# Patient Record
Sex: FEMALE | Race: OTHER | HISPANIC OR LATINO
[De-identification: names, ages, dates, MRNs, and addresses within clinical notes are randomized per-mention and may not be internally consistent; named-entity substitution may affect disease eponyms.]

---

## 2017-03-13 ENCOUNTER — APPOINTMENT (OUTPATIENT)
Dept: SURGICAL ONCOLOGY | Facility: CLINIC | Age: 62
End: 2017-03-13

## 2017-03-13 VITALS
HEART RATE: 71 BPM | BODY MASS INDEX: 21.52 KG/M2 | SYSTOLIC BLOOD PRESSURE: 169 MMHG | WEIGHT: 114 LBS | HEIGHT: 61 IN | DIASTOLIC BLOOD PRESSURE: 102 MMHG

## 2017-05-02 ENCOUNTER — OUTPATIENT (OUTPATIENT)
Dept: OUTPATIENT SERVICES | Facility: HOSPITAL | Age: 62
LOS: 1 days | End: 2017-05-02
Payer: MEDICAID

## 2017-05-02 VITALS
DIASTOLIC BLOOD PRESSURE: 70 MMHG | HEART RATE: 55 BPM | RESPIRATION RATE: 14 BRPM | WEIGHT: 110.89 LBS | TEMPERATURE: 98 F | SYSTOLIC BLOOD PRESSURE: 104 MMHG | HEIGHT: 61.5 IN

## 2017-05-02 DIAGNOSIS — Z85.07 PERSONAL HISTORY OF MALIGNANT NEOPLASM OF PANCREAS: Chronic | ICD-10-CM

## 2017-05-02 DIAGNOSIS — G51.0 BELL'S PALSY: Chronic | ICD-10-CM

## 2017-05-02 DIAGNOSIS — K46.9 UNSPECIFIED ABDOMINAL HERNIA WITHOUT OBSTRUCTION OR GANGRENE: ICD-10-CM

## 2017-05-02 DIAGNOSIS — D37.6 NEOPLASM OF UNCERTAIN BEHAVIOR OF LIVER, GALLBLADDER AND BILE DUCTS: ICD-10-CM

## 2017-05-02 LAB
BLD GP AB SCN SERPL QL: NEGATIVE — SIGNIFICANT CHANGE UP
BUN SERPL-MCNC: 15 MG/DL — SIGNIFICANT CHANGE UP (ref 7–23)
CALCIUM SERPL-MCNC: 9.6 MG/DL — SIGNIFICANT CHANGE UP (ref 8.4–10.5)
CHLORIDE SERPL-SCNC: 99 MMOL/L — SIGNIFICANT CHANGE UP (ref 98–107)
CO2 SERPL-SCNC: 30 MMOL/L — SIGNIFICANT CHANGE UP (ref 22–31)
CREAT SERPL-MCNC: 0.79 MG/DL — SIGNIFICANT CHANGE UP (ref 0.5–1.3)
GLUCOSE SERPL-MCNC: 100 MG/DL — HIGH (ref 70–99)
HCT VFR BLD CALC: 40 % — SIGNIFICANT CHANGE UP (ref 34.5–45)
HGB BLD-MCNC: 12.3 G/DL — SIGNIFICANT CHANGE UP (ref 11.5–15.5)
MCHC RBC-ENTMCNC: 28.5 PG — SIGNIFICANT CHANGE UP (ref 27–34)
MCHC RBC-ENTMCNC: 30.8 % — LOW (ref 32–36)
MCV RBC AUTO: 92.6 FL — SIGNIFICANT CHANGE UP (ref 80–100)
PLATELET # BLD AUTO: 242 K/UL — SIGNIFICANT CHANGE UP (ref 150–400)
PMV BLD: 10.4 FL — SIGNIFICANT CHANGE UP (ref 7–13)
POTASSIUM SERPL-MCNC: 3.9 MMOL/L — SIGNIFICANT CHANGE UP (ref 3.5–5.3)
POTASSIUM SERPL-SCNC: 3.9 MMOL/L — SIGNIFICANT CHANGE UP (ref 3.5–5.3)
RBC # BLD: 4.32 M/UL — SIGNIFICANT CHANGE UP (ref 3.8–5.2)
RBC # FLD: 12.8 % — SIGNIFICANT CHANGE UP (ref 10.3–14.5)
RH IG SCN BLD-IMP: POSITIVE — SIGNIFICANT CHANGE UP
SODIUM SERPL-SCNC: 141 MMOL/L — SIGNIFICANT CHANGE UP (ref 135–145)
WBC # BLD: 5.05 K/UL — SIGNIFICANT CHANGE UP (ref 3.8–10.5)
WBC # FLD AUTO: 5.05 K/UL — SIGNIFICANT CHANGE UP (ref 3.8–10.5)

## 2017-05-02 PROCEDURE — 93010 ELECTROCARDIOGRAM REPORT: CPT

## 2017-05-02 RX ORDER — SODIUM CHLORIDE 9 MG/ML
1000 INJECTION, SOLUTION INTRAVENOUS
Qty: 0 | Refills: 0 | Status: DISCONTINUED | OUTPATIENT
Start: 2017-05-16 | End: 2017-05-17

## 2017-05-02 NOTE — H&P PST ADULT - VISION (WITH CORRECTIVE LENSES IF THE PATIENT USUALLY WEARS THEM):
Normal vision: sees adequately in most situations; can see medication labels, newsprint Normal vision: sees adequately in most situations; can see medication labels, newsprint/reading glasses

## 2017-05-02 NOTE — H&P PST ADULT - RS GEN PE MLT RESP DETAILS PC
breath sounds equal/no rales/good air movement/no intercostal retractions/airway patent/no rhonchi/no subcutaneous emphysema/no chest wall tenderness/respirations non-labored/no wheezes/clear to auscultation bilaterally

## 2017-05-02 NOTE — H&P PST ADULT - NEGATIVE GENERAL GENITOURINARY SYMPTOMS
no flank pain R/no bladder infections/no dysuria/no renal colic/no incontinence/normal urinary frequency/no urinary hesitancy/no hematuria/no nocturia/no flank pain L

## 2017-05-02 NOTE — H&P PST ADULT - NEGATIVE GENERAL SYMPTOMS
no weight loss/no polydipsia/no polyphagia/no polyuria/no fever/no malaise/no weight gain/no anorexia/no sweating/no chills/no fatigue

## 2017-05-02 NOTE — H&P PST ADULT - FAMILY HISTORY
Mother  Still living? No  Family history of COPD (chronic obstructive pulmonary disease), Age at diagnosis: Age Unknown  Family history of hypertension, Age at diagnosis: Age Unknown     Sibling  Still living? Yes, Estimated age: Age Unknown  Family history of hypertension, Age at diagnosis: Age Unknown

## 2017-05-02 NOTE — H&P PST ADULT - NEGATIVE ENMT SYMPTOMS
no ear pain/no hearing difficulty/no tinnitus/no throat pain/no nasal obstruction/no dysphagia/no nasal congestion/no sinus symptoms/no vertigo/no nasal discharge

## 2017-05-02 NOTE — H&P PST ADULT - NEGATIVE NEUROLOGICAL SYMPTOMS
no paresthesias/no difficulty walking/no weakness/no headache/no loss of sensation/no generalized seizures/no focal seizures/no confusion/no transient paralysis/no syncope/no tremors/no vertigo/no hemiparesis

## 2017-05-02 NOTE — H&P PST ADULT - NEGATIVE GASTROINTESTINAL SYMPTOMS
no vomiting/no melena/no nausea/no constipation/no change in bowel habits/no diarrhea/no abdominal pain no diarrhea/no melena/no change in bowel habits/no abdominal pain/no vomiting/no nausea

## 2017-05-02 NOTE — H&P PST ADULT - NEGATIVE OPHTHALMOLOGIC SYMPTOMS
no photophobia/no discharge R/no diplopia/no loss of vision L/no blurred vision L/no blurred vision R/no pain R/no loss of vision R

## 2017-05-02 NOTE — H&P PST ADULT - NEGATIVE CARDIOVASCULAR SYMPTOMS
no orthopnea/no peripheral edema/no paroxysmal nocturnal dyspnea/no palpitations/no chest pain/no claudication/no dyspnea on exertion

## 2017-05-02 NOTE — H&P PST ADULT - GASTROINTESTINAL DETAILS
bowel sounds normal/no rigidity/no masses palpable/no guarding/no rebound tenderness/no organomegaly/soft/no bruit/nontender/no distention

## 2017-05-02 NOTE — H&P PST ADULT - NEGATIVE MUSCULOSKELETAL SYMPTOMS
no muscle weakness/no back pain/no joint swelling/no arthralgia/no neck pain/no stiffness/no arthritis

## 2017-05-02 NOTE — H&P PST ADULT - PSH
Bell's palsy  s/p surgery  H/O pancreatic cancer  s/p Whipple procedure 2011  S/P Breast Biopsy, Right  benign-1987  s/p hemorrhoidectomy-1994    s/p lap cholecystectomy- 1990

## 2017-05-02 NOTE — H&P PST ADULT - NSANTHOSAYNRD_GEN_A_CORE
No. BENITO screening performed.  STOP BANG Legend: 0-2 = LOW Risk; 3-4 = INTERMEDIATE Risk; 5-8 = HIGH Risk

## 2017-05-02 NOTE — H&P PST ADULT - CORNEAL ABRASION RISK
clear eyes daily, and face mask to sleep, because right eye does not close properly due to Hx of bell's palsy

## 2017-05-02 NOTE — H&P PST ADULT - PMH
Cholelithiasis, Common Bile Duct  1990  Depression    FUO (Fever of Unknown Origin)  1964- right facial palsy  Incisional hernia    Pancreatic cancer  s/p Whipple in 2011

## 2017-05-02 NOTE — H&P PST ADULT - REASON FOR ADMISSION
neoplasm of uncertain behavior of liver, gallbladder, or bile ducts  incisional hernia without obstruction or gangrene

## 2017-05-02 NOTE — H&P PST ADULT - PROBLEM SELECTOR PLAN 1
Pt is scheduled for repair of incisional hernia, abdominal wall reconstruction with muscle flaps and skin flaps for 5/16/17. Pt is scheduled for repair of incisional hernia, abdominal wall reconstruction with muscle flaps and skin flaps for 5/16/17. Preop instructions, pepcid, surgical scrub provided. Pt stated understanding. Pt is scheduled for repair of incisional hernia, abdominal wall reconstruction with muscle flaps and skin flaps for 5/16/17. Preop instructions, pepcid, surgical scrub provided. Pt stated understanding. Pending medical clearance

## 2017-05-16 ENCOUNTER — RESULT REVIEW (OUTPATIENT)
Age: 62
End: 2017-05-16

## 2017-05-16 ENCOUNTER — APPOINTMENT (OUTPATIENT)
Dept: SURGICAL ONCOLOGY | Facility: HOSPITAL | Age: 62
End: 2017-05-16

## 2017-05-16 ENCOUNTER — INPATIENT (INPATIENT)
Facility: HOSPITAL | Age: 62
LOS: 5 days | Discharge: HOME HEALTH | End: 2017-05-22
Attending: SPECIALIST | Admitting: SPECIALIST
Payer: MEDICAID

## 2017-05-16 VITALS
DIASTOLIC BLOOD PRESSURE: 72 MMHG | HEART RATE: 67 BPM | RESPIRATION RATE: 15 BRPM | HEIGHT: 61 IN | WEIGHT: 110.89 LBS | OXYGEN SATURATION: 99 % | SYSTOLIC BLOOD PRESSURE: 137 MMHG | TEMPERATURE: 98 F

## 2017-05-16 DIAGNOSIS — D37.6 NEOPLASM OF UNCERTAIN BEHAVIOR OF LIVER, GALLBLADDER AND BILE DUCTS: ICD-10-CM

## 2017-05-16 DIAGNOSIS — Z85.07 PERSONAL HISTORY OF MALIGNANT NEOPLASM OF PANCREAS: Chronic | ICD-10-CM

## 2017-05-16 DIAGNOSIS — G51.0 BELL'S PALSY: Chronic | ICD-10-CM

## 2017-05-16 PROCEDURE — 88307 TISSUE EXAM BY PATHOLOGIST: CPT | Mod: 26

## 2017-05-16 PROCEDURE — 44120 REMOVAL OF SMALL INTESTINE: CPT

## 2017-05-16 PROCEDURE — 49561: CPT | Mod: 59

## 2017-05-16 PROCEDURE — 88305 TISSUE EXAM BY PATHOLOGIST: CPT | Mod: 26

## 2017-05-16 PROCEDURE — 44121 REMOVAL OF SMALL INTESTINE: CPT

## 2017-05-16 PROCEDURE — 43633 REMOVAL OF STOMACH PARTIAL: CPT

## 2017-05-16 PROCEDURE — 44050 REDUCE BOWEL OBSTRUCTION: CPT

## 2017-05-16 RX ORDER — CEFOTETAN DISODIUM 1 G
2 VIAL (EA) INJECTION EVERY 12 HOURS
Qty: 0 | Refills: 0 | Status: COMPLETED | OUTPATIENT
Start: 2017-05-16 | End: 2017-05-17

## 2017-05-16 RX ORDER — ACETAMINOPHEN 500 MG
1000 TABLET ORAL ONCE
Qty: 0 | Refills: 0 | Status: COMPLETED | OUTPATIENT
Start: 2017-05-16 | End: 2017-05-16

## 2017-05-16 RX ORDER — HYDROMORPHONE HYDROCHLORIDE 2 MG/ML
0.5 INJECTION INTRAMUSCULAR; INTRAVENOUS; SUBCUTANEOUS
Qty: 0 | Refills: 0 | Status: DISCONTINUED | OUTPATIENT
Start: 2017-05-16 | End: 2017-05-17

## 2017-05-16 RX ORDER — HEPARIN SODIUM 5000 [USP'U]/ML
5000 INJECTION INTRAVENOUS; SUBCUTANEOUS EVERY 12 HOURS
Qty: 0 | Refills: 0 | Status: DISCONTINUED | OUTPATIENT
Start: 2017-05-16 | End: 2017-05-20

## 2017-05-16 RX ORDER — ACETAMINOPHEN 500 MG
1000 TABLET ORAL ONCE
Qty: 0 | Refills: 0 | Status: COMPLETED | OUTPATIENT
Start: 2017-05-17 | End: 2017-05-17

## 2017-05-16 RX ORDER — HYDROMORPHONE HYDROCHLORIDE 2 MG/ML
0.5 INJECTION INTRAMUSCULAR; INTRAVENOUS; SUBCUTANEOUS
Qty: 0 | Refills: 0 | Status: DISCONTINUED | OUTPATIENT
Start: 2017-05-16 | End: 2017-05-20

## 2017-05-16 RX ORDER — ONDANSETRON 8 MG/1
4 TABLET, FILM COATED ORAL ONCE
Qty: 0 | Refills: 0 | Status: COMPLETED | OUTPATIENT
Start: 2017-05-16 | End: 2017-05-16

## 2017-05-16 RX ORDER — PANTOPRAZOLE SODIUM 20 MG/1
40 TABLET, DELAYED RELEASE ORAL DAILY
Qty: 0 | Refills: 0 | Status: DISCONTINUED | OUTPATIENT
Start: 2017-05-16 | End: 2017-05-22

## 2017-05-16 RX ORDER — ONDANSETRON 8 MG/1
4 TABLET, FILM COATED ORAL EVERY 6 HOURS
Qty: 0 | Refills: 0 | Status: DISCONTINUED | OUTPATIENT
Start: 2017-05-16 | End: 2017-05-22

## 2017-05-16 RX ORDER — ACETAMINOPHEN 500 MG
1000 TABLET ORAL ONCE
Qty: 0 | Refills: 0 | Status: COMPLETED | OUTPATIENT
Start: 2017-05-16 | End: 2017-05-17

## 2017-05-16 RX ORDER — NALOXONE HYDROCHLORIDE 4 MG/.1ML
0.1 SPRAY NASAL
Qty: 0 | Refills: 0 | Status: DISCONTINUED | OUTPATIENT
Start: 2017-05-16 | End: 2017-05-20

## 2017-05-16 RX ADMIN — Medication 100 GRAM(S): at 15:15

## 2017-05-16 RX ADMIN — SODIUM CHLORIDE 100 MILLILITER(S): 9 INJECTION, SOLUTION INTRAVENOUS at 23:49

## 2017-05-16 RX ADMIN — HYDROMORPHONE HYDROCHLORIDE 0.5 MILLIGRAM(S): 2 INJECTION INTRAMUSCULAR; INTRAVENOUS; SUBCUTANEOUS at 16:17

## 2017-05-16 RX ADMIN — HEPARIN SODIUM 5000 UNIT(S): 5000 INJECTION INTRAVENOUS; SUBCUTANEOUS at 23:16

## 2017-05-16 RX ADMIN — Medication 400 MILLIGRAM(S): at 20:37

## 2017-05-16 RX ADMIN — HYDROMORPHONE HYDROCHLORIDE 0.5 MILLIGRAM(S): 2 INJECTION INTRAMUSCULAR; INTRAVENOUS; SUBCUTANEOUS at 16:30

## 2017-05-16 RX ADMIN — Medication 1000 MILLIGRAM(S): at 21:51

## 2017-05-16 RX ADMIN — HYDROMORPHONE HYDROCHLORIDE 0.5 MILLIGRAM(S): 2 INJECTION INTRAMUSCULAR; INTRAVENOUS; SUBCUTANEOUS at 15:53

## 2017-05-16 RX ADMIN — ONDANSETRON 4 MILLIGRAM(S): 8 TABLET, FILM COATED ORAL at 16:00

## 2017-05-16 NOTE — BRIEF OPERATIVE NOTE - OPERATION/FINDINGS
Exploratory laparotomy, ventral hernia repair (no mesh) with anterior component separation. Calin En Y Jejuno-Jejunostomy for enterotomy in afferent jejunal limb

## 2017-05-16 NOTE — BRIEF OPERATIVE NOTE - PROCEDURE
Ventral hernia repair  05/16/2017  with anterior component separation. Calin En Y Jejuno-Jejunostomy for enterotomy in afferent jejunal limb  Active  [id number]

## 2017-05-16 NOTE — BRIEF OPERATIVE NOTE - COMMENTS
extubated in OR => PACU => FLOOR  - NPO/NGT  - Received epidural preop  - Must be on protonix indefinitely to prevent marginal ulcer

## 2017-05-16 NOTE — BRIEF OPERATIVE NOTE - SPECIMENS
Small bowel and stomach at gastreojej, small bowel at enteroenterostomy, skin and scar from abdominal wound

## 2017-05-17 LAB
ALBUMIN SERPL ELPH-MCNC: 3 G/DL — LOW (ref 3.3–5)
ALP SERPL-CCNC: 50 U/L — SIGNIFICANT CHANGE UP (ref 40–120)
ALT FLD-CCNC: 10 U/L — SIGNIFICANT CHANGE UP (ref 4–33)
APTT BLD: 33.1 SEC — SIGNIFICANT CHANGE UP (ref 27.5–37.4)
AST SERPL-CCNC: 21 U/L — SIGNIFICANT CHANGE UP (ref 4–32)
BILIRUB SERPL-MCNC: 0.3 MG/DL — SIGNIFICANT CHANGE UP (ref 0.2–1.2)
BUN SERPL-MCNC: 20 MG/DL — SIGNIFICANT CHANGE UP (ref 7–23)
CALCIUM SERPL-MCNC: 8.3 MG/DL — LOW (ref 8.4–10.5)
CHLORIDE SERPL-SCNC: 103 MMOL/L — SIGNIFICANT CHANGE UP (ref 98–107)
CO2 SERPL-SCNC: 25 MMOL/L — SIGNIFICANT CHANGE UP (ref 22–31)
CREAT SERPL-MCNC: 0.99 MG/DL — SIGNIFICANT CHANGE UP (ref 0.5–1.3)
GLUCOSE SERPL-MCNC: 118 MG/DL — HIGH (ref 70–99)
HCT VFR BLD CALC: 34.8 % — SIGNIFICANT CHANGE UP (ref 34.5–45)
HGB BLD-MCNC: 10.9 G/DL — LOW (ref 11.5–15.5)
INR BLD: 1.26 — HIGH (ref 0.88–1.17)
MAGNESIUM SERPL-MCNC: 2.3 MG/DL — SIGNIFICANT CHANGE UP (ref 1.6–2.6)
MCHC RBC-ENTMCNC: 29 PG — SIGNIFICANT CHANGE UP (ref 27–34)
MCHC RBC-ENTMCNC: 31.3 % — LOW (ref 32–36)
MCV RBC AUTO: 92.6 FL — SIGNIFICANT CHANGE UP (ref 80–100)
PHOSPHATE SERPL-MCNC: 3.7 MG/DL — SIGNIFICANT CHANGE UP (ref 2.5–4.5)
PLATELET # BLD AUTO: 188 K/UL — SIGNIFICANT CHANGE UP (ref 150–400)
PMV BLD: 10.7 FL — SIGNIFICANT CHANGE UP (ref 7–13)
POTASSIUM SERPL-MCNC: 4.9 MMOL/L — SIGNIFICANT CHANGE UP (ref 3.5–5.3)
POTASSIUM SERPL-SCNC: 4.9 MMOL/L — SIGNIFICANT CHANGE UP (ref 3.5–5.3)
PROT SERPL-MCNC: 6 G/DL — SIGNIFICANT CHANGE UP (ref 6–8.3)
PROTHROM AB SERPL-ACNC: 14.2 SEC — HIGH (ref 9.8–13.1)
RBC # BLD: 3.76 M/UL — LOW (ref 3.8–5.2)
RBC # FLD: 13.1 % — SIGNIFICANT CHANGE UP (ref 10.3–14.5)
SODIUM SERPL-SCNC: 139 MMOL/L — SIGNIFICANT CHANGE UP (ref 135–145)
WBC # BLD: 11.17 K/UL — HIGH (ref 3.8–10.5)
WBC # FLD AUTO: 11.17 K/UL — HIGH (ref 3.8–10.5)

## 2017-05-17 RX ORDER — DEXTROSE MONOHYDRATE, SODIUM CHLORIDE, AND POTASSIUM CHLORIDE 50; .745; 4.5 G/1000ML; G/1000ML; G/1000ML
1000 INJECTION, SOLUTION INTRAVENOUS
Qty: 0 | Refills: 0 | Status: DISCONTINUED | OUTPATIENT
Start: 2017-05-17 | End: 2017-05-22

## 2017-05-17 RX ADMIN — Medication 1000 MILLIGRAM(S): at 02:20

## 2017-05-17 RX ADMIN — HEPARIN SODIUM 5000 UNIT(S): 5000 INJECTION INTRAVENOUS; SUBCUTANEOUS at 13:06

## 2017-05-17 RX ADMIN — Medication 100 GRAM(S): at 05:06

## 2017-05-17 RX ADMIN — Medication 1000 MILLIGRAM(S): at 15:58

## 2017-05-17 RX ADMIN — HYDROMORPHONE HYDROCHLORIDE 0.5 MILLIGRAM(S): 2 INJECTION INTRAMUSCULAR; INTRAVENOUS; SUBCUTANEOUS at 16:23

## 2017-05-17 RX ADMIN — DEXTROSE MONOHYDRATE, SODIUM CHLORIDE, AND POTASSIUM CHLORIDE 100 MILLILITER(S): 50; .745; 4.5 INJECTION, SOLUTION INTRAVENOUS at 13:05

## 2017-05-17 RX ADMIN — PANTOPRAZOLE SODIUM 40 MILLIGRAM(S): 20 TABLET, DELAYED RELEASE ORAL at 13:05

## 2017-05-17 RX ADMIN — Medication 400 MILLIGRAM(S): at 14:58

## 2017-05-17 RX ADMIN — Medication 1000 MILLIGRAM(S): at 09:00

## 2017-05-17 RX ADMIN — Medication 400 MILLIGRAM(S): at 08:36

## 2017-05-17 RX ADMIN — Medication 400 MILLIGRAM(S): at 01:59

## 2017-05-17 RX ADMIN — HEPARIN SODIUM 5000 UNIT(S): 5000 INJECTION INTRAVENOUS; SUBCUTANEOUS at 23:09

## 2017-05-17 NOTE — PROGRESS NOTE ADULT - SUBJECTIVE AND OBJECTIVE BOX
ANESTHESIA POSTOP CHECK    61y Male POSTOP DAY 1 S/P     Vital Signs Last 24 Hrs  T(C): 36.9, Max: 37.1 (05-17 @ 12:00)  T(F): 98.4, Max: 98.8 (05-17 @ 12:00)  HR: 97 (77 - 102)  BP: 125/69 (91/44 - 145/78)  BP(mean): 75 (55 - 97)  RR: 18 (10 - 20)  SpO2: 100% (93% - 100%)  I&O's Summary  I & Os for 24h ending 17 May 2017 07:00  =============================================  IN: 910 ml / OUT: 1265 ml / NET: -355 ml    I & Os for current day (as of 17 May 2017 19:07)  =============================================  IN: 1160 ml / OUT: 395 ml / NET: 765 ml      [ x] NO APPARENT ANESTHESIA COMPLICATIONS      Comments:

## 2017-05-17 NOTE — PROGRESS NOTE ADULT - SUBJECTIVE AND OBJECTIVE BOX
Day _2_ of Anesthesia Pain Management Service    Allergies    codeine (Other; Fever)  sausage (Hives)    Intolerances        SUBJECTIVE:"I'm starting to have pain in the lower belly."  Pain Scale Score	At rest: _3-4/10__ 	With Activity: ___ 	    THERAPY:PCEA  HYDROmorphone (10 MICROgram(s)/mL) + BUpivacaine 0.0625% in 0.9% Sodium Chloride PCEA 250milliLiter(s) Epidural PCA Continuous  Demand dose _3ml_ lockout _15_ (minutes) Continuous Rate _6ml_ Total: _119mL_ Daily      MEDICATIONS  (STANDING):  heparin  Injectable 5000Unit(s) SubCutaneous every 12 hours  pantoprazole  Injectable 40milliGRAM(s) IV Push daily  acetaminophen  IVPB. 1000milliGRAM(s) IV Intermittent once  dextrose 5% + sodium chloride 0.45% with potassium chloride 20 mEq/L 1000milliLiter(s) IV Continuous <Continuous>    MEDICATIONS  (PRN):  HYDROmorphone (10 MICROgram(s)/mL) + BUpivacaine 0.0625% in 0.9% Sodium Chloride PCEA Rescue Clinician  Bolus 5milliLiter(s) Epidural every 15 minutes PRN for Pain Score greater than 6  naloxone Injectable 0.1milliGRAM(s) IV Push every 3 minutes PRN For ANY of the following changes in patient status:  A. RR LESS THAN 10 breaths per minute, B. Oxygen saturation LESS THAN 90%, C. Sedation score of 6  ondansetron Injectable 4milliGRAM(s) IV Push every 6 hours PRN Nausea  HYDROmorphone  Injectable 0.5milliGRAM(s) IV Push every 3 hours PRN Severe Pain unrelieved by PCEA      OBJECTIVE: A&O x3, NAD, sitting up in chair    Assessment of Catheter Site:	[ ] Left	[ ] Right  [X] Epidural 	[ ] Femoral	      [ ] Saphenous   [ ] Supraclavicular   [ ] Other:    [X] Dressing intact, reinforced 	[X] Site non-tender	[X] Site without erythema, discharge, edema  [X] Epidural tubing and connection checked	[X] Gross neurological exam within normal limits  [ ] Catheter removed – tip intact		    Temperature: 36.6    PT/INR - ( 17 May 2017 05:45 )   PT: 14.2 SEC;   INR: 1.26          PTT - ( 17 May 2017 05:45 )  PTT:33.1 SEC                          10.9   11.17 )-----------( 188      ( 17 May 2017 05:45 )             34.8       05-17    139  |  103  |  20  ----------------------------<  118<H>  4.9   |  25  |  0.99    Ca    8.3<L>      17 May 2017 05:45  Phos  3.7     05-17  Mg     2.3     05-17    TPro  6.0  /  Alb  3.0<L>  /  TBili  0.3  /  DBili  x   /  AST  21  /  ALT  10  /  AlkPhos  50  05-17      Sedation Score:	[X] Alert	[ ] Drowsy	[ ] Arousable	[ ] Asleep	[ ] Unresponsive    Side Effects:	[X] None	[ ] Nausea	[ ] Vomiting	[ ] Pruritus  		[ ] Weakness		[ ] Numbness	[ ] Other:    ASSESSMENT/ PLAN:    Therapy to  be:	[X] Continue   [ ] Discontinued   [ ] Change to prn Analgesics    Documentation and Verification of current medications:  [X] Done	[ ] Not done, not eligible  [ ] Not done, reason not given      Comments:  NPO with NGTube. Report sitting x3 hours. Use of PCEA reinforced. Consider returning to bed for rest, then back to chair in the later PM.

## 2017-05-17 NOTE — PROGRESS NOTE ADULT - SUBJECTIVE AND OBJECTIVE BOX
Day __1_ of Anesthesia Pain Management Service    SUBJECTIVE:  Pain control adequate per patient    Therapy:	  [ ] IV PCA	   [x ] Epidural           [ ] s/p Spinal Opoid              [ ] Postpartum infusion	  [ ] Patient controlled regional anesthesia (PCRA)    [ ] prn Analgesics    OBJECTIVE:   [ x] No new signs     [ ] Other:    Side Effects:  [ x] None			[ ] Other:    Assessment of Catheter Site:		[ ] Intact		[ ] Other:    ASSESSMENT/PLAN  [x ] Continue current therapy    [ ] Therapy changed to:    [ ] IV PCA       [ ] Epidural     [ ] prn Analgesics     Comments: reinforced PCEA use

## 2017-05-18 LAB
ALBUMIN SERPL ELPH-MCNC: 2.8 G/DL — LOW (ref 3.3–5)
ALP SERPL-CCNC: 50 U/L — SIGNIFICANT CHANGE UP (ref 40–120)
ALT FLD-CCNC: 14 U/L — SIGNIFICANT CHANGE UP (ref 4–33)
APTT BLD: 34.1 SEC — SIGNIFICANT CHANGE UP (ref 27.5–37.4)
AST SERPL-CCNC: 31 U/L — SIGNIFICANT CHANGE UP (ref 4–32)
BILIRUB SERPL-MCNC: 0.3 MG/DL — SIGNIFICANT CHANGE UP (ref 0.2–1.2)
BUN SERPL-MCNC: 10 MG/DL — SIGNIFICANT CHANGE UP (ref 7–23)
CALCIUM SERPL-MCNC: 8.2 MG/DL — LOW (ref 8.4–10.5)
CHLORIDE SERPL-SCNC: 102 MMOL/L — SIGNIFICANT CHANGE UP (ref 98–107)
CO2 SERPL-SCNC: 28 MMOL/L — SIGNIFICANT CHANGE UP (ref 22–31)
CREAT SERPL-MCNC: 0.67 MG/DL — SIGNIFICANT CHANGE UP (ref 0.5–1.3)
GLUCOSE SERPL-MCNC: 123 MG/DL — HIGH (ref 70–99)
HCT VFR BLD CALC: 32.7 % — LOW (ref 34.5–45)
HGB BLD-MCNC: 10 G/DL — LOW (ref 11.5–15.5)
INR BLD: 1.17 — SIGNIFICANT CHANGE UP (ref 0.88–1.17)
MAGNESIUM SERPL-MCNC: 1.9 MG/DL — SIGNIFICANT CHANGE UP (ref 1.6–2.6)
MCHC RBC-ENTMCNC: 28.1 PG — SIGNIFICANT CHANGE UP (ref 27–34)
MCHC RBC-ENTMCNC: 30.6 % — LOW (ref 32–36)
MCV RBC AUTO: 91.9 FL — SIGNIFICANT CHANGE UP (ref 80–100)
PHOSPHATE SERPL-MCNC: 0.8 MG/DL — CRITICAL LOW (ref 2.5–4.5)
PHOSPHATE SERPL-MCNC: 2.3 MG/DL — LOW (ref 2.5–4.5)
PLATELET # BLD AUTO: 160 K/UL — SIGNIFICANT CHANGE UP (ref 150–400)
PMV BLD: 10.5 FL — SIGNIFICANT CHANGE UP (ref 7–13)
POTASSIUM SERPL-MCNC: 4.3 MMOL/L — SIGNIFICANT CHANGE UP (ref 3.5–5.3)
POTASSIUM SERPL-SCNC: 4.3 MMOL/L — SIGNIFICANT CHANGE UP (ref 3.5–5.3)
PROT SERPL-MCNC: 5.8 G/DL — LOW (ref 6–8.3)
PROTHROM AB SERPL-ACNC: 13.1 SEC — SIGNIFICANT CHANGE UP (ref 9.8–13.1)
RBC # BLD: 3.56 M/UL — LOW (ref 3.8–5.2)
RBC # FLD: 13.1 % — SIGNIFICANT CHANGE UP (ref 10.3–14.5)
SODIUM SERPL-SCNC: 139 MMOL/L — SIGNIFICANT CHANGE UP (ref 135–145)
WBC # BLD: 7.48 K/UL — SIGNIFICANT CHANGE UP (ref 3.8–10.5)
WBC # FLD AUTO: 7.48 K/UL — SIGNIFICANT CHANGE UP (ref 3.8–10.5)

## 2017-05-18 RX ORDER — MAGNESIUM SULFATE 500 MG/ML
1 VIAL (ML) INJECTION ONCE
Qty: 0 | Refills: 0 | Status: COMPLETED | OUTPATIENT
Start: 2017-05-18 | End: 2017-05-18

## 2017-05-18 RX ADMIN — DEXTROSE MONOHYDRATE, SODIUM CHLORIDE, AND POTASSIUM CHLORIDE 100 MILLILITER(S): 50; .745; 4.5 INJECTION, SOLUTION INTRAVENOUS at 09:04

## 2017-05-18 RX ADMIN — HEPARIN SODIUM 5000 UNIT(S): 5000 INJECTION INTRAVENOUS; SUBCUTANEOUS at 13:02

## 2017-05-18 RX ADMIN — Medication 63.75 MILLIMOLE(S): at 15:28

## 2017-05-18 RX ADMIN — Medication 1 DROP(S): at 13:02

## 2017-05-18 RX ADMIN — Medication 63.75 MILLIMOLE(S): at 09:56

## 2017-05-18 RX ADMIN — Medication 100 GRAM(S): at 09:59

## 2017-05-18 RX ADMIN — Medication 63.75 MILLIMOLE(S): at 22:34

## 2017-05-18 RX ADMIN — PANTOPRAZOLE SODIUM 40 MILLIGRAM(S): 20 TABLET, DELAYED RELEASE ORAL at 13:02

## 2017-05-18 NOTE — PROGRESS NOTE ADULT - ASSESSMENT
61F s/p ex-lap, BENJI, enterotomy -> jejuno-jejunostomy Calin-en-Y, anterior component separation  - AM labs  - Pain control  - f/u I/O, NGT output 61F s/p ex-lap, BENJI, enterotomy -> jejuno-jejunostomy Calin-en-Y, anterior component separation  - Pain control  - f/u I/O, NGT output  - Will restart home eye drops  - OOB, Ambulate, IS

## 2017-05-18 NOTE — PROGRESS NOTE ADULT - SUBJECTIVE AND OBJECTIVE BOX
Surgical oncology (D Team) daily Progress note    S:    VITALS    T(C): 37.2, Max: 37.7 (05-17 @ 18:02)  HR: 68 (68 - 79)  BP: 107/55 (98/51 - 107/62)  RR: 18 (18 - 18)  SpO2: 99% (96% - 99%)  Wt(kg): --  I&O's Detail  I & Os for 24h ending 17 May 2017 07:00  =============================================  IN:    lactated ringers.: 1500 ml    Total IN: 1500 ml  ---------------------------------------------  OUT:    Indwelling Catheter - Urethral: 670 ml    Bulb: 80 ml    Bulb: 75 ml    Bulb: 59 ml    Nasoenteral Tube: 50 ml    Total OUT: 934 ml  ---------------------------------------------  Total NET: 566 ml    I & Os for current day (as of 18 May 2017 02:49)  =============================================  IN:    dextrose 5% + sodium chloride 0.45% with potassium chloride 20 mEq/L: 800 ml    lactated ringers.: 400 ml    Total IN: 1200 ml  ---------------------------------------------  OUT:    Indwelling Catheter - Urethral: 1150 ml    Nasoenteral Tube: 60 ml    Bulb: 49 ml    Bulb: 43 ml    Bulb: 34.5 ml    Total OUT: 1336.5 ml  ---------------------------------------------  Total NET: -136.5 ml      PHYSICAL EXAM    Gen:  Abdominal: Surgical oncology (D Team) daily Progress note    S: No acute overnight events; pain well controlled with PCEA. Patient was OOB, ambulating with RN today.     VITALS    T(C): 37.2, Max: 37.7 (05-17 @ 18:02)  HR: 68 (68 - 79)  BP: 107/55 (98/51 - 107/62)  RR: 18 (18 - 18)  SpO2: 99% (96% - 99%)  Wt(kg): --  I&O's Detail  I & Os for 24h ending 17 May 2017 07:00  =============================================  IN:    lactated ringers.: 1500 ml    Total IN: 1500 ml  ---------------------------------------------  OUT:    Indwelling Catheter - Urethral: 670 ml    Bulb: 80 ml    Bulb: 75 ml    Bulb: 59 ml    Nasoenteral Tube: 50 ml    Total OUT: 934 ml  ---------------------------------------------  Total NET: 566 ml    I & Os for current day (as of 18 May 2017 02:49)  =============================================  IN:    dextrose 5% + sodium chloride 0.45% with potassium chloride 20 mEq/L: 800 ml    lactated ringers.: 400 ml    Total IN: 1200 ml  ---------------------------------------------  OUT:    Indwelling Catheter - Urethral: 1150 ml    Nasoenteral Tube: 60 ml    Bulb: 49 ml    Bulb: 43 ml    Bulb: 34.5 ml    Total OUT: 1336.5 ml  ---------------------------------------------  Total NET: -136.5 ml      PHYSICAL EXAM    Gen: NAD, AOx3  HEENT: R eye with injection  Abdominal: Abdominal binder in place. Soft, NT, ND. Incisions healing well, c/d/i. HENRRY with serosanguinous output.

## 2017-05-18 NOTE — PROVIDER CONTACT NOTE (CRITICAL VALUE NOTIFICATION) - ACTION/TREATMENT ORDERED:
sodium phosphate x2 and magnesium ordered and administered. Phosphorus level to be rechecked after completion of meds.

## 2017-05-18 NOTE — PROGRESS NOTE ADULT - SUBJECTIVE AND OBJECTIVE BOX
Seen and examined. Doing well, pain well controlled. No bowel movements, no flatus. No nausea, emesis.     AVSS.   NGT in place,. draining bilious fluid. 70cc/24hrs.   HENRRY drains 38, 45, 54. draining serosanguinous fluid.  Abdomen softly distended.  Incisions c/d/i. No erythema or induration.

## 2017-05-18 NOTE — PROGRESS NOTE ADULT - SUBJECTIVE AND OBJECTIVE BOX
Day _3__ of Anesthesia Pain Management Service    SUBJECTIVE:  Pain Scale Score	At rest: ___ 	With Activity: ___ 	[ x] Refer to charted pain scores    THERAPY:  [x ] Epidural Bupivacaine 0.0625% and Hydromorphone  		[x ] 10 micrograms/mL	[ ] 5 micrograms/mL  [ ] Epidural Bupivacaine 0.0625% and Fentanyl - 2 micrograms/mL  [ ] Epidural Ropivacaine 0.1% plain – 1 mg/mL  [ ] Patient Controlled Regional Anesthesia (PCRA) Ropivacaine  		[ ] 0.2%			[ ] 0.1%    Demand dose _3__ lockout __15_ (minutes) Continuous Rate _6__ Total: _161 ml__ Daily      MEDICATIONS  (STANDING):  heparin  Injectable 5000Unit(s) SubCutaneous every 12 hours  pantoprazole  Injectable 40milliGRAM(s) IV Push daily  HYDROmorphone (10 MICROgram(s)/mL) + BUpivacaine 0.0625% in 0.9% Sodium Chloride PCEA 250milliLiter(s) Epidural PCA Continuous  dextrose 5% + sodium chloride 0.45% with potassium chloride 20 mEq/L 1000milliLiter(s) IV Continuous <Continuous>  sodium phosphate IVPB 15milliMole(s) IV Intermittent once    MEDICATIONS  (PRN):  HYDROmorphone (10 MICROgram(s)/mL) + BUpivacaine 0.0625% in 0.9% Sodium Chloride PCEA Rescue Clinician  Bolus 5milliLiter(s) Epidural every 15 minutes PRN for Pain Score greater than 6  naloxone Injectable 0.1milliGRAM(s) IV Push every 3 minutes PRN For ANY of the following changes in patient status:  A. RR LESS THAN 10 breaths per minute, B. Oxygen saturation LESS THAN 90%, C. Sedation score of 6  ondansetron Injectable 4milliGRAM(s) IV Push every 6 hours PRN Nausea  HYDROmorphone  Injectable 0.5milliGRAM(s) IV Push every 3 hours PRN Severe Pain unrelieved by PCEA  artificial tears (preservative free) Ophthalmic Solution 1Drop(s) Right EYE three times a day PRN Dry Eyes      OBJECTIVE:    Assessment of Catheter Site:	[ ] Left	[ ] Right  [ x] Epidural 	[ ] Femoral	      [ ] Saphenous   [ ] Supraclavicular   [ ] Other:    [x ] Dressing intact	[x ] Site non-tender	[x ] Site without erythema, discharge, edema  [ x] Epidural tubing and connection checked	[x [ Gross neurological exam within normal limits  [ ] Catheter removed – tip intact		[ x] Afebrile	[ ] Febrile: ___    PT/INR - ( 18 May 2017 06:21 )   PT: 13.1 SEC;   INR: 1.17          PTT - ( 18 May 2017 06:21 )  PTT:34.1 SEC                      10.0   7.48  )-----------( 160      ( 18 May 2017 06:21 )             32.7     Vital Signs Last 24 Hrs  T(C): 36.9, Max: 37.9 (05-18 @ 06:33)  T(F): 98.5, Max: 100.3 (05-18 @ 06:33)  HR: 79 (68 - 93)  BP: 147/74 (102/58 - 147/74)  BP(mean): --  RR: 18 (16 - 18)  SpO2: 100% (97% - 100%)      Sedation Score:	[x ] Alert	[ ] Drowsy	[ ] Arousable	[ ] Asleep	[ ] Unresponsive    Side Effects:	[x ] None	[ ] Nausea	[ ] Vomiting	[ ] Pruritus  		[ ] Weakness		[ ] Numbness	[ ] Other:    ASSESSMENT/ PLAN:    Therapy to  be:	[x ] Continue   [ ] Discontinued   [ ] Change to prn Analgesics    Documentation and Verification of current medications:  [ X ] Done	[ ] Not done, not eligible, reason:    Comments: Consider DC epidural 5/19

## 2017-05-18 NOTE — PROGRESS NOTE ADULT - ASSESSMENT
61F w history of Whipple procedure now POD 2 s/p ventral hernia repair, repair of enterotomy with Calin-en-Y jejunojejunostomy, abdominal wall reconstruction with b/l component separation.    - Continue antibiotics  - Continue abdominal binder  - Continue HENRRY drains  - Ambulate  - DVT prophylaxis

## 2017-05-19 LAB
ALBUMIN SERPL ELPH-MCNC: 2.9 G/DL — LOW (ref 3.3–5)
ALP SERPL-CCNC: 53 U/L — SIGNIFICANT CHANGE UP (ref 40–120)
ALT FLD-CCNC: 17 U/L — SIGNIFICANT CHANGE UP (ref 4–33)
APTT BLD: 33.9 SEC — SIGNIFICANT CHANGE UP (ref 27.5–37.4)
APTT BLD: 44.5 SEC — HIGH (ref 27.5–37.4)
AST SERPL-CCNC: 32 U/L — SIGNIFICANT CHANGE UP (ref 4–32)
BILIRUB SERPL-MCNC: 0.3 MG/DL — SIGNIFICANT CHANGE UP (ref 0.2–1.2)
BUN SERPL-MCNC: 2 MG/DL — LOW (ref 7–23)
CALCIUM SERPL-MCNC: 8.1 MG/DL — LOW (ref 8.4–10.5)
CHLORIDE SERPL-SCNC: 105 MMOL/L — SIGNIFICANT CHANGE UP (ref 98–107)
CO2 SERPL-SCNC: 28 MMOL/L — SIGNIFICANT CHANGE UP (ref 22–31)
CREAT SERPL-MCNC: 0.47 MG/DL — LOW (ref 0.5–1.3)
GLUCOSE SERPL-MCNC: 131 MG/DL — HIGH (ref 70–99)
HCT VFR BLD CALC: 33.1 % — LOW (ref 34.5–45)
HGB BLD-MCNC: 10.6 G/DL — LOW (ref 11.5–15.5)
INR BLD: 1.13 — SIGNIFICANT CHANGE UP (ref 0.88–1.17)
MAGNESIUM SERPL-MCNC: 2.6 MG/DL — SIGNIFICANT CHANGE UP (ref 1.6–2.6)
MCHC RBC-ENTMCNC: 29.2 PG — SIGNIFICANT CHANGE UP (ref 27–34)
MCHC RBC-ENTMCNC: 32 % — SIGNIFICANT CHANGE UP (ref 32–36)
MCV RBC AUTO: 91.2 FL — SIGNIFICANT CHANGE UP (ref 80–100)
PHOSPHATE SERPL-MCNC: 2.4 MG/DL — LOW (ref 2.5–4.5)
PLATELET # BLD AUTO: 174 K/UL — SIGNIFICANT CHANGE UP (ref 150–400)
PMV BLD: 10.4 FL — SIGNIFICANT CHANGE UP (ref 7–13)
POTASSIUM SERPL-MCNC: 4 MMOL/L — SIGNIFICANT CHANGE UP (ref 3.5–5.3)
POTASSIUM SERPL-SCNC: 4 MMOL/L — SIGNIFICANT CHANGE UP (ref 3.5–5.3)
PROT SERPL-MCNC: 6.2 G/DL — SIGNIFICANT CHANGE UP (ref 6–8.3)
PROTHROM AB SERPL-ACNC: 12.7 SEC — SIGNIFICANT CHANGE UP (ref 9.8–13.1)
RBC # BLD: 3.63 M/UL — LOW (ref 3.8–5.2)
RBC # FLD: 13.1 % — SIGNIFICANT CHANGE UP (ref 10.3–14.5)
SODIUM SERPL-SCNC: 141 MMOL/L — SIGNIFICANT CHANGE UP (ref 135–145)
WBC # BLD: 5.63 K/UL — SIGNIFICANT CHANGE UP (ref 3.8–10.5)
WBC # FLD AUTO: 5.63 K/UL — SIGNIFICANT CHANGE UP (ref 3.8–10.5)

## 2017-05-19 RX ADMIN — HYDROMORPHONE HYDROCHLORIDE 0.5 MILLIGRAM(S): 2 INJECTION INTRAMUSCULAR; INTRAVENOUS; SUBCUTANEOUS at 22:39

## 2017-05-19 RX ADMIN — PANTOPRAZOLE SODIUM 40 MILLIGRAM(S): 20 TABLET, DELAYED RELEASE ORAL at 13:05

## 2017-05-19 RX ADMIN — Medication 63.75 MILLIMOLE(S): at 22:16

## 2017-05-19 RX ADMIN — HEPARIN SODIUM 5000 UNIT(S): 5000 INJECTION INTRAVENOUS; SUBCUTANEOUS at 00:17

## 2017-05-19 RX ADMIN — ONDANSETRON 4 MILLIGRAM(S): 8 TABLET, FILM COATED ORAL at 15:17

## 2017-05-19 RX ADMIN — HYDROMORPHONE HYDROCHLORIDE 0.5 MILLIGRAM(S): 2 INJECTION INTRAMUSCULAR; INTRAVENOUS; SUBCUTANEOUS at 22:24

## 2017-05-19 NOTE — PROGRESS NOTE ADULT - SUBJECTIVE AND OBJECTIVE BOX
Day _4__ of Anesthesia Pain Management Service    SUBJECTIVE:  Pain Scale Score	At rest: ___ 	With Activity: ___ 	[ x ] Refer to charted pain scores    THERAPY:  [x ] Epidural Bupivacaine 0.0625% and Hydromorphone  		[x ] 10 micrograms/mL	[ ] 5 micrograms/mL  [ ] Epidural Bupivacaine 0.0625% and Fentanyl - 2 micrograms/mL  [ ] Epidural Ropivacaine 0.1% plain – 1 mg/mL  [ ] Patient Controlled Regional Anesthesia (PCRA) Ropivacaine  		[ ] 0.2%			[ ] 0.1%    Demand dose _2__ lockout _15__ (minutes) Continuous Rate __6_ Total: ___ Daily      MEDICATIONS  (STANDING):  heparin  Injectable 5000Unit(s) SubCutaneous every 12 hours  pantoprazole  Injectable 40milliGRAM(s) IV Push daily  HYDROmorphone (10 MICROgram(s)/mL) + BUpivacaine 0.0625% in 0.9% Sodium Chloride PCEA 250milliLiter(s) Epidural PCA Continuous  dextrose 5% + sodium chloride 0.45% with potassium chloride 20 mEq/L 1000milliLiter(s) IV Continuous <Continuous>    MEDICATIONS  (PRN):  HYDROmorphone (10 MICROgram(s)/mL) + BUpivacaine 0.0625% in 0.9% Sodium Chloride PCEA Rescue Clinician  Bolus 5milliLiter(s) Epidural every 15 minutes PRN for Pain Score greater than 6  naloxone Injectable 0.1milliGRAM(s) IV Push every 3 minutes PRN For ANY of the following changes in patient status:  A. RR LESS THAN 10 breaths per minute, B. Oxygen saturation LESS THAN 90%, C. Sedation score of 6  ondansetron Injectable 4milliGRAM(s) IV Push every 6 hours PRN Nausea  HYDROmorphone  Injectable 0.5milliGRAM(s) IV Push every 3 hours PRN Severe Pain unrelieved by PCEA  artificial tears (preservative free) Ophthalmic Solution 1Drop(s) Right EYE three times a day PRN Dry Eyes      OBJECTIVE:    Assessment of Catheter Site:	[ ] Left	[ ] Right  [x ] Epidural 	[ ] Femoral	      [ ] Saphenous   [ ] Supraclavicular   [ ] Other:    [x ] Dressing intact	[x ] Site non-tender	[ x] Site without erythema, discharge, edema  [x ] Epidural tubing and connection checked	[x] Gross neurological exam within normal limits  [ ] Catheter removed – tip intact		[x ] Afebrile	[ ] Febrile: ___    PT/INR - ( 19 May 2017 05:54 )   PT: 12.7 SEC;   INR: 1.13          PTT - ( 19 May 2017 05:54 )  PTT:44.5 SEC                      10.6   5.63  )-----------( 174      ( 19 May 2017 05:54 )             33.1     Vital Signs Last 24 Hrs  T(C): 37.1, Max: 37.2 (05-19 @ 05:03)  T(F): 98.8, Max: 98.9 (05-19 @ 05:03)  HR: 94 (84 - 94)  BP: 145/79 (132/72 - 156/80)  BP(mean): --  RR: 16 (16 - 18)  SpO2: 100% (97% - 100%)      Sedation Score:	[x ] Alert	[ ] Drowsy	[ ] Arousable	[ ] Asleep	[ ] Unresponsive    Side Effects:	[x ] None	[ ] Nausea	[ ] Vomiting	[ ] Pruritus  		[ ] Weakness		[ ] Numbness	[ ] Other:    ASSESSMENT/ PLAN:    Therapy to  be:	[ x] Continue   [ ] Discontinued   [ ] Change to prn Analgesics    Documentation and Verification of current medications:  [ X ] Done	[ ] Not done, not eligible, reason:    Comments: Waiting for repeat PTT to remove epidural, if remains elevated will hold next dose of heparin and pull on 5/20.

## 2017-05-19 NOTE — PROGRESS NOTE ADULT - SUBJECTIVE AND OBJECTIVE BOX
Patient did well overnight. Asking when NGT will be D/C'd - related that this is per General surgery. Denies BM or flatus    T(C): 37.1, Max: 37.2 (05-19 @ 05:03)  HR: 94 (84 - 94)  BP: 145/79 (132/72 - 156/80)  RR: 16 (16 - 18)  SpO2: 100% (97% - 100%)  Wt(kg): --I & Os for 24h ending 05-19 @ 07:00  =============================================  IN: 2100 ml / OUT: 5412.5 ml / NET: -3312.5 ml    I & Os for current day (as of 05-19 @ 15:20)  =============================================  IN: 400 ml / OUT: 1547.5 ml / NET: -1147.5 ml      Exam  Abdominal binder in place. Incision C/D/I. No collections. No erythema. No drainage from incision. HENRRY serosanguinous    A/P  - NPO/NGT per General surgery  - PCEA per general surgery  - C/w binder  - Encourage ambulation  - IS/Pulm Toilet

## 2017-05-19 NOTE — PROGRESS NOTE ADULT - SUBJECTIVE AND OBJECTIVE BOX
Procedure:  ex-lap, BENJI, jejuno-jejunostomy Calin-en-Y, anterior component separation  Post operative date: 3    S: No flatus or BM yet. Patient anxious for NGT to be removed.     VITAL SIGNS: T(C): 37.8, Max: 37.8 (05-19 @ 16:50)  HR: 87 (84 - 94)  BP: 150/87 (132/72 - 150/87)  RR: 16 (16 - 18)  SpO2: 100% (97% - 100%)  Wt(kg): --      I+Os: I&O's Summary  I & Os for 24h ending 19 May 2017 07:00  =============================================  IN: 2100 ml / OUT: 5412.5 ml / NET: -3312.5 ml    I & Os for current day (as of 19 May 2017 20:29)  =============================================  IN: 1000 ml / OUT: 1760 ml / NET: -760 ml    Labs:                         10.6   5.63  )-----------( 174      ( 19 May 2017 05:54 )             33.1      05-19    141  |  105  |  2<L>  ----------------------------<  131<H>  4.0   |  28  |  0.47<L>    Ca    8.1<L>      19 May 2017 05:54  Phos  2.4     05-19  Mg     2.6     05-19    TPro  6.2  /  Alb  2.9<L>  /  TBili  0.3  /  DBili  x   /  AST  32  /  ALT  17  /  AlkPhos  53  05-19    PHYSICAL EXAM    Gen: NAD, AOx3  HEENT: R eye with injection  Abdominal: Abdominal binder in place. Soft, NT, ND. Incisions healing well, c/d/i. HENRRY with serosanguinous output.       MEDICATIONS:  heparin  Injectable 5000Unit(s) SubCutaneous every 12 hours  pantoprazole  Injectable 40milliGRAM(s) IV Push daily  HYDROmorphone (10 MICROgram(s)/mL) + BUpivacaine 0.0625% in 0.9% Sodium Chloride PCEA Rescue Clinician  Bolus 5milliLiter(s) Epidural every 15 minutes PRN  naloxone Injectable 0.1milliGRAM(s) IV Push every 3 minutes PRN  ondansetron Injectable 4milliGRAM(s) IV Push every 6 hours PRN  HYDROmorphone (10 MICROgram(s)/mL) + BUpivacaine 0.0625% in 0.9% Sodium Chloride PCEA 250milliLiter(s) Epidural PCA Continuous  HYDROmorphone  Injectable 0.5milliGRAM(s) IV Push every 3 hours PRN  dextrose 5% + sodium chloride 0.45% with potassium chloride 20 mEq/L 1000milliLiter(s) IV Continuous <Continuous>  artificial tears (preservative free) Ophthalmic Solution 1Drop(s) Right EYE three times a day PRN  sodium phosphate IVPB 15milliMole(s) IV Intermittent once      IMAGING STUDIES:

## 2017-05-19 NOTE — PROGRESS NOTE ADULT - ASSESSMENT
61F s/p ex-lap, BENJI, enterotomy -> jejuno-jejunostomy Calin-en-Y, anterior component separation  - Pain control  - f/u I/O, NGT output  - OOB, Ambulate, IS 61F s/p ex-lap, BENJI, enterotomy -> jejuno-jejunostomy Calin-en-Y, anterior component separation, POD 3.   - Pain control  - Await GI function  - f/u I/O, NGT output  - OOB, Ambulate, IS

## 2017-05-20 LAB
ALBUMIN SERPL ELPH-MCNC: 3.3 G/DL — SIGNIFICANT CHANGE UP (ref 3.3–5)
ALP SERPL-CCNC: 61 U/L — SIGNIFICANT CHANGE UP (ref 40–120)
ALT FLD-CCNC: 25 U/L — SIGNIFICANT CHANGE UP (ref 4–33)
APTT BLD: 30.2 SEC — SIGNIFICANT CHANGE UP (ref 27.5–37.4)
AST SERPL-CCNC: 39 U/L — HIGH (ref 4–32)
BILIRUB SERPL-MCNC: 0.5 MG/DL — SIGNIFICANT CHANGE UP (ref 0.2–1.2)
BUN SERPL-MCNC: 3 MG/DL — LOW (ref 7–23)
CALCIUM SERPL-MCNC: 8.9 MG/DL — SIGNIFICANT CHANGE UP (ref 8.4–10.5)
CHLORIDE SERPL-SCNC: 102 MMOL/L — SIGNIFICANT CHANGE UP (ref 98–107)
CO2 SERPL-SCNC: 27 MMOL/L — SIGNIFICANT CHANGE UP (ref 22–31)
CREAT SERPL-MCNC: 0.55 MG/DL — SIGNIFICANT CHANGE UP (ref 0.5–1.3)
GLUCOSE SERPL-MCNC: 117 MG/DL — HIGH (ref 70–99)
HCT VFR BLD CALC: 37.4 % — SIGNIFICANT CHANGE UP (ref 34.5–45)
HGB BLD-MCNC: 11.7 G/DL — SIGNIFICANT CHANGE UP (ref 11.5–15.5)
INR BLD: 1.12 — SIGNIFICANT CHANGE UP (ref 0.88–1.17)
MCHC RBC-ENTMCNC: 28.8 PG — SIGNIFICANT CHANGE UP (ref 27–34)
MCHC RBC-ENTMCNC: 31.3 % — LOW (ref 32–36)
MCV RBC AUTO: 92.1 FL — SIGNIFICANT CHANGE UP (ref 80–100)
PLATELET # BLD AUTO: 198 K/UL — SIGNIFICANT CHANGE UP (ref 150–400)
PMV BLD: 10.7 FL — SIGNIFICANT CHANGE UP (ref 7–13)
POTASSIUM SERPL-MCNC: 4.1 MMOL/L — SIGNIFICANT CHANGE UP (ref 3.5–5.3)
POTASSIUM SERPL-SCNC: 4.1 MMOL/L — SIGNIFICANT CHANGE UP (ref 3.5–5.3)
PROT SERPL-MCNC: 7 G/DL — SIGNIFICANT CHANGE UP (ref 6–8.3)
PROTHROM AB SERPL-ACNC: 12.6 SEC — SIGNIFICANT CHANGE UP (ref 9.8–13.1)
RBC # BLD: 4.06 M/UL — SIGNIFICANT CHANGE UP (ref 3.8–5.2)
RBC # FLD: 13.1 % — SIGNIFICANT CHANGE UP (ref 10.3–14.5)
SODIUM SERPL-SCNC: 139 MMOL/L — SIGNIFICANT CHANGE UP (ref 135–145)
WBC # BLD: 5.67 K/UL — SIGNIFICANT CHANGE UP (ref 3.8–10.5)
WBC # FLD AUTO: 5.67 K/UL — SIGNIFICANT CHANGE UP (ref 3.8–10.5)

## 2017-05-20 RX ORDER — ENOXAPARIN SODIUM 100 MG/ML
40 INJECTION SUBCUTANEOUS DAILY
Qty: 0 | Refills: 0 | Status: DISCONTINUED | OUTPATIENT
Start: 2017-05-20 | End: 2017-05-22

## 2017-05-20 RX ORDER — HYDROMORPHONE HYDROCHLORIDE 2 MG/ML
0.5 INJECTION INTRAMUSCULAR; INTRAVENOUS; SUBCUTANEOUS EVERY 4 HOURS
Qty: 0 | Refills: 0 | Status: DISCONTINUED | OUTPATIENT
Start: 2017-05-20 | End: 2017-05-21

## 2017-05-20 RX ORDER — NALOXONE HYDROCHLORIDE 4 MG/.1ML
0.1 SPRAY NASAL
Qty: 0 | Refills: 0 | Status: DISCONTINUED | OUTPATIENT
Start: 2017-05-20 | End: 2017-05-22

## 2017-05-20 RX ORDER — HYDROMORPHONE HYDROCHLORIDE 2 MG/ML
0.5 INJECTION INTRAMUSCULAR; INTRAVENOUS; SUBCUTANEOUS
Qty: 0 | Refills: 0 | Status: DISCONTINUED | OUTPATIENT
Start: 2017-05-20 | End: 2017-05-20

## 2017-05-20 RX ORDER — ONDANSETRON 8 MG/1
4 TABLET, FILM COATED ORAL EVERY 6 HOURS
Qty: 0 | Refills: 0 | Status: DISCONTINUED | OUTPATIENT
Start: 2017-05-20 | End: 2017-05-20

## 2017-05-20 RX ORDER — HYDROMORPHONE HYDROCHLORIDE 2 MG/ML
1 INJECTION INTRAMUSCULAR; INTRAVENOUS; SUBCUTANEOUS EVERY 4 HOURS
Qty: 0 | Refills: 0 | Status: DISCONTINUED | OUTPATIENT
Start: 2017-05-20 | End: 2017-05-21

## 2017-05-20 RX ORDER — HYDROMORPHONE HYDROCHLORIDE 2 MG/ML
30 INJECTION INTRAMUSCULAR; INTRAVENOUS; SUBCUTANEOUS
Qty: 0 | Refills: 0 | Status: DISCONTINUED | OUTPATIENT
Start: 2017-05-20 | End: 2017-05-20

## 2017-05-20 RX ADMIN — PANTOPRAZOLE SODIUM 40 MILLIGRAM(S): 20 TABLET, DELAYED RELEASE ORAL at 11:45

## 2017-05-20 RX ADMIN — Medication 1 DROP(S): at 13:28

## 2017-05-20 RX ADMIN — HYDROMORPHONE HYDROCHLORIDE 0.5 MILLIGRAM(S): 2 INJECTION INTRAMUSCULAR; INTRAVENOUS; SUBCUTANEOUS at 22:36

## 2017-05-20 RX ADMIN — DEXTROSE MONOHYDRATE, SODIUM CHLORIDE, AND POTASSIUM CHLORIDE 50 MILLILITER(S): 50; .745; 4.5 INJECTION, SOLUTION INTRAVENOUS at 22:21

## 2017-05-20 RX ADMIN — Medication 1 DROP(S): at 23:26

## 2017-05-20 RX ADMIN — Medication 1 DROP(S): at 07:47

## 2017-05-20 RX ADMIN — DEXTROSE MONOHYDRATE, SODIUM CHLORIDE, AND POTASSIUM CHLORIDE 50 MILLILITER(S): 50; .745; 4.5 INJECTION, SOLUTION INTRAVENOUS at 11:38

## 2017-05-20 RX ADMIN — HYDROMORPHONE HYDROCHLORIDE 0.5 MILLIGRAM(S): 2 INJECTION INTRAMUSCULAR; INTRAVENOUS; SUBCUTANEOUS at 22:21

## 2017-05-20 RX ADMIN — ENOXAPARIN SODIUM 40 MILLIGRAM(S): 100 INJECTION SUBCUTANEOUS at 17:28

## 2017-05-20 NOTE — PROGRESS NOTE ADULT - SUBJECTIVE AND OBJECTIVE BOX
Day __6_ of Anesthesia Pain Management Service    SUBJECTIVE:  Pain Scale Score	At rest: ___ 	With Activity: ___ 	[ x ] Refer to charted pain scores    THERAPY:  [x ] Epidural Bupivacaine 0.0625% and Hydromorphone  		[ x] 10 micrograms/mL	[ ] 5 micrograms/mL  [ ] Epidural Bupivacaine 0.0625% and Fentanyl - 2 micrograms/mL  [ ] Epidural Ropivacaine 0.1% plain – 1 mg/mL  [ ] Patient Controlled Regional Anesthesia (PCRA) Ropivacaine  		[ ] 0.2%			[ ] 0.1%    Demand dose __3_ lockout __15_ (minutes) Continuous Rate __6_ Total: ___ Daily      MEDICATIONS  (STANDING):  pantoprazole  Injectable 40milliGRAM(s) IV Push daily  dextrose 5% + sodium chloride 0.45% with potassium chloride 20 mEq/L 1000milliLiter(s) IV Continuous <Continuous>  artificial tears (preservative free) Ophthalmic Solution 1Drop(s) Right EYE three times a day  enoxaparin Injectable 40milliGRAM(s) SubCutaneous daily    MEDICATIONS  (PRN):  ondansetron Injectable 4milliGRAM(s) IV Push every 6 hours PRN Nausea  HYDROmorphone  Injectable 0.5milliGRAM(s) IV Push every 3 hours PRN Severe Pain unrelieved by PCEA      OBJECTIVE:    Assessment of Catheter Site:	[ ] Left	[ ] Right  [x ] Epidural 	[ ] Femoral	      [ ] Saphenous   [ ] Supraclavicular   [ ] Other:    [ x] Dressing intact	[x ] Site non-tender	[x ] Site without erythema, discharge, edema  [x ] Epidural tubing and connection checked	[x [ Gross neurological exam within normal limits  [ x] Catheter removed – tip intact		[x ] Afebrile	[ ] Febrile: ___    PT/INR - ( 20 May 2017 06:12 )   PT: 12.6 SEC;   INR: 1.12          PTT - ( 20 May 2017 06:12 )  PTT:30.2 SEC                      11.7   5.67  )-----------( 198      ( 20 May 2017 06:12 )             37.4     Vital Signs Last 24 Hrs  T(C): 37, Max: 37.8 (05-19 @ 16:50)  T(F): 98.6, Max: 100.1 (05-19 @ 16:50)  HR: 98 (86 - 98)  BP: 124/77 (124/77 - 171/83)  BP(mean): --  RR: 16 (16 - 18)  SpO2: 100% (98% - 100%)      Sedation Score:	[x ] Alert	[ ] Drowsy	[ ] Arousable	[ ] Asleep	[ ] Unresponsive    Side Effects:	[x ] None	[ ] Nausea	[ ] Vomiting	[ ] Pruritus  		[ ] Weakness		[ ] Numbness	[ ] Other:    ASSESSMENT/ PLAN:    Therapy to  be:	[ ] Continue   [ ] Discontinued   [ x] Change to IV PCA    Documentation and Verification of current medications:  [ X ] Done	[ ] Not done, not eligible, reason:    Comments:

## 2017-05-20 NOTE — PROGRESS NOTE ADULT - SUBJECTIVE AND OBJECTIVE BOX
Procedure:  ex-lap, BENJI, enterotomy, jejuno-jejunostomy Calin-en-Y, anterior component separation  Post operative date: 4    S: NO acute overnight events. Has not passed flatus or had a bowel movement.     VITAL SIGNS: T(C): 37, Max: 37.8 (05-19 @ 16:50)  HR: 98 (86 - 98)  BP: 124/77 (124/77 - 171/83)  RR: 16 (16 - 18)  SpO2: 100% (98% - 100%)  Wt(kg): --      I+Os: I&O's Summary  I & Os for 24h ending 20 May 2017 07:00  =============================================  IN: 1000 ml / OUT: 3435 ml / NET: -2435 ml    I & Os for current day (as of 20 May 2017 16:36)  =============================================  IN: 0 ml / OUT: 867.5 ml / NET: -867.5 ml    Labs:                         11.7   5.67  )-----------( 198      ( 20 May 2017 06:12 )             37.4      05-20    139  |  102  |  3<L>  ----------------------------<  117<H>  4.1   |  27  |  0.55    Ca    8.9      20 May 2017 06:12  Phos  2.4     05-19  Mg     2.6     05-19    TPro  7.0  /  Alb  3.3  /  TBili  0.5  /  DBili  x   /  AST  39<H>  /  ALT  25  /  AlkPhos  61  05-20    PHYSICAL EXAM:  Gen: NAD, AOx3  HEENT: R eye with injection  Abdominal: Abdominal binder in place. Soft, NT, flat.  Incisions healing well, c/d/i. HENRRY with serosanguinous output.     MEDICATIONS:  pantoprazole  Injectable 40milliGRAM(s) IV Push daily  ondansetron Injectable 4milliGRAM(s) IV Push every 6 hours PRN  dextrose 5% + sodium chloride 0.45% with potassium chloride 20 mEq/L 1000milliLiter(s) IV Continuous <Continuous>  artificial tears (preservative free) Ophthalmic Solution 1Drop(s) Right EYE three times a day  HYDROmorphone PCA (1 mG/mL) 30milliLiter(s) PCA Continuous PCA Continuous  enoxaparin Injectable 40milliGRAM(s) SubCutaneous daily  HYDROmorphone PCA (1 mG/mL) Rescue Clinician Bolus 0.5milliGRAM(s) IV Push every 15 minutes PRN  naloxone Injectable 0.1milliGRAM(s) IV Push every 3 minutes PRN      IMAGING STUDIES:

## 2017-05-20 NOTE — PROGRESS NOTE ADULT - ASSESSMENT
61F s/p ex-lap, BENJI, enterotomy -> jejuno-jejunostomy Calin-en-Y, anterior component separation, POD 3.   - Pain control  - Await GI function  - f/u I/O, NGT output  - OOB, Ambulate, IS 61F s/p ex-lap, BENJI, enterotomy -> jejuno-jejunostomy Calin-en-Y, anterior component separation, POD 4.   - Pain control  - Await GI function  - NGT clamp trial today; will remove NGT if <200  - OOB, Ambulate, IS   - Patient seen and examined with Dr. Green

## 2017-05-21 LAB
BUN SERPL-MCNC: 8 MG/DL — SIGNIFICANT CHANGE UP (ref 7–23)
CALCIUM SERPL-MCNC: 8.9 MG/DL — SIGNIFICANT CHANGE UP (ref 8.4–10.5)
CHLORIDE SERPL-SCNC: 105 MMOL/L — SIGNIFICANT CHANGE UP (ref 98–107)
CO2 SERPL-SCNC: 28 MMOL/L — SIGNIFICANT CHANGE UP (ref 22–31)
CREAT SERPL-MCNC: 0.56 MG/DL — SIGNIFICANT CHANGE UP (ref 0.5–1.3)
GLUCOSE SERPL-MCNC: 115 MG/DL — HIGH (ref 70–99)
HCT VFR BLD CALC: 33.6 % — LOW (ref 34.5–45)
HGB BLD-MCNC: 10.4 G/DL — LOW (ref 11.5–15.5)
MAGNESIUM SERPL-MCNC: 2 MG/DL — SIGNIFICANT CHANGE UP (ref 1.6–2.6)
MCHC RBC-ENTMCNC: 28.4 PG — SIGNIFICANT CHANGE UP (ref 27–34)
MCHC RBC-ENTMCNC: 31 % — LOW (ref 32–36)
MCV RBC AUTO: 91.8 FL — SIGNIFICANT CHANGE UP (ref 80–100)
PHOSPHATE SERPL-MCNC: 2.5 MG/DL — SIGNIFICANT CHANGE UP (ref 2.5–4.5)
PLATELET # BLD AUTO: 238 K/UL — SIGNIFICANT CHANGE UP (ref 150–400)
PMV BLD: 9.9 FL — SIGNIFICANT CHANGE UP (ref 7–13)
POTASSIUM SERPL-MCNC: 4.1 MMOL/L — SIGNIFICANT CHANGE UP (ref 3.5–5.3)
POTASSIUM SERPL-SCNC: 4.1 MMOL/L — SIGNIFICANT CHANGE UP (ref 3.5–5.3)
RBC # BLD: 3.66 M/UL — LOW (ref 3.8–5.2)
RBC # FLD: 13.2 % — SIGNIFICANT CHANGE UP (ref 10.3–14.5)
SODIUM SERPL-SCNC: 143 MMOL/L — SIGNIFICANT CHANGE UP (ref 135–145)
WBC # BLD: 5.29 K/UL — SIGNIFICANT CHANGE UP (ref 3.8–10.5)
WBC # FLD AUTO: 5.29 K/UL — SIGNIFICANT CHANGE UP (ref 3.8–10.5)

## 2017-05-21 RX ORDER — OXYCODONE HYDROCHLORIDE 5 MG/1
10 TABLET ORAL EVERY 4 HOURS
Qty: 0 | Refills: 0 | Status: DISCONTINUED | OUTPATIENT
Start: 2017-05-21 | End: 2017-05-22

## 2017-05-21 RX ORDER — ACETAMINOPHEN 500 MG
650 TABLET ORAL EVERY 6 HOURS
Qty: 0 | Refills: 0 | Status: DISCONTINUED | OUTPATIENT
Start: 2017-05-21 | End: 2017-05-22

## 2017-05-21 RX ORDER — SODIUM,POTASSIUM PHOSPHATES 278-250MG
1 POWDER IN PACKET (EA) ORAL ONCE
Qty: 0 | Refills: 0 | Status: COMPLETED | OUTPATIENT
Start: 2017-05-21 | End: 2017-05-21

## 2017-05-21 RX ORDER — OXYCODONE HYDROCHLORIDE 5 MG/1
5 TABLET ORAL EVERY 4 HOURS
Qty: 0 | Refills: 0 | Status: DISCONTINUED | OUTPATIENT
Start: 2017-05-21 | End: 2017-05-22

## 2017-05-21 RX ADMIN — OXYCODONE HYDROCHLORIDE 10 MILLIGRAM(S): 5 TABLET ORAL at 23:49

## 2017-05-21 RX ADMIN — PANTOPRAZOLE SODIUM 40 MILLIGRAM(S): 20 TABLET, DELAYED RELEASE ORAL at 12:18

## 2017-05-21 RX ADMIN — HYDROMORPHONE HYDROCHLORIDE 1 MILLIGRAM(S): 2 INJECTION INTRAMUSCULAR; INTRAVENOUS; SUBCUTANEOUS at 20:30

## 2017-05-21 RX ADMIN — HYDROMORPHONE HYDROCHLORIDE 0.5 MILLIGRAM(S): 2 INJECTION INTRAMUSCULAR; INTRAVENOUS; SUBCUTANEOUS at 17:35

## 2017-05-21 RX ADMIN — Medication 1 DROP(S): at 06:03

## 2017-05-21 RX ADMIN — Medication 1 DROP(S): at 23:22

## 2017-05-21 RX ADMIN — HYDROMORPHONE HYDROCHLORIDE 0.5 MILLIGRAM(S): 2 INJECTION INTRAMUSCULAR; INTRAVENOUS; SUBCUTANEOUS at 17:11

## 2017-05-21 RX ADMIN — HYDROMORPHONE HYDROCHLORIDE 0.5 MILLIGRAM(S): 2 INJECTION INTRAMUSCULAR; INTRAVENOUS; SUBCUTANEOUS at 11:50

## 2017-05-21 RX ADMIN — ENOXAPARIN SODIUM 40 MILLIGRAM(S): 100 INJECTION SUBCUTANEOUS at 12:18

## 2017-05-21 RX ADMIN — HYDROMORPHONE HYDROCHLORIDE 0.5 MILLIGRAM(S): 2 INJECTION INTRAMUSCULAR; INTRAVENOUS; SUBCUTANEOUS at 11:26

## 2017-05-21 RX ADMIN — Medication 1 TABLET(S): at 12:18

## 2017-05-21 RX ADMIN — Medication 1 DROP(S): at 14:21

## 2017-05-21 RX ADMIN — HYDROMORPHONE HYDROCHLORIDE 0.5 MILLIGRAM(S): 2 INJECTION INTRAMUSCULAR; INTRAVENOUS; SUBCUTANEOUS at 05:01

## 2017-05-21 RX ADMIN — HYDROMORPHONE HYDROCHLORIDE 0.5 MILLIGRAM(S): 2 INJECTION INTRAMUSCULAR; INTRAVENOUS; SUBCUTANEOUS at 04:46

## 2017-05-21 RX ADMIN — HYDROMORPHONE HYDROCHLORIDE 1 MILLIGRAM(S): 2 INJECTION INTRAMUSCULAR; INTRAVENOUS; SUBCUTANEOUS at 20:15

## 2017-05-21 NOTE — PROGRESS NOTE ADULT - SUBJECTIVE AND OBJECTIVE BOX
Anesthesia Pain Management Service    SUBJECTIVE: Patient is doing well now off IV PCA and no problems.    Pain Scale Score:  Refer to charted pain scores    THERAPY:    [ ] IV PCA Morphine		[ ] 5 mg/mL	[ ] 1 mg/mL  [x ] IV PCA Hydromorphone	[ ] 5 mg/mL	[x ] 1 mg/mL  [ ] IV PCA Fentanyl		[ ] 50 micrograms/mL    Demand dose __0.2mg_ lockout _6__ (minutes) Continuous Rate _0__ Total: ___  Daily (stopped)      MEDICATIONS  (STANDING):  pantoprazole  Injectable 40milliGRAM(s) IV Push daily  dextrose 5% + sodium chloride 0.45% with potassium chloride 20 mEq/L 1000milliLiter(s) IV Continuous <Continuous>  artificial tears (preservative free) Ophthalmic Solution 1Drop(s) Right EYE three times a day  enoxaparin Injectable 40milliGRAM(s) SubCutaneous daily    MEDICATIONS  (PRN):  ondansetron Injectable 4milliGRAM(s) IV Push every 6 hours PRN Nausea  naloxone Injectable 0.1milliGRAM(s) IV Push every 3 minutes PRN For ANY of the following changes in patient status:  A. RR LESS THAN 10 breaths per minute, B. Oxygen saturation LESS THAN 90%, C. Sedation score of 6  HYDROmorphone  Injectable 0.5milliGRAM(s) IV Push every 4 hours PRN Moderate Pain (4 - 6)  HYDROmorphone  Injectable 1milliGRAM(s) IV Push every 4 hours PRN Severe Pain (7 - 10)      OBJECTIVE:    Sedation Score:	[x ] Alert	[ ] Drowsy 	[ ] Arousable	[ ] Asleep	[ ] Unresponsive    Side Effects:	[x ] None	[ ] Nausea	[ ] Vomiting	[ ] Pruritus  		[ ] Other:    Vital Signs Last 24 Hrs  T(C): 36.4, Max: 37.1 (05-20 @ 16:51)  T(F): 97.5, Max: 98.8 (05-20 @ 16:51)  HR: 81 (81 - 100)  BP: 155/87 (124/77 - 155/87)  BP(mean): --  RR: 18 (16 - 18)  SpO2: 100% (100% - 100%)    ASSESSMENT/ PLAN    Therapy to  be:	[ ] Continue   [x ] Discontinued   [x ] Change to prn Analgesics    Documentation and Verification of current medications:   [X] Done	[ ] Not done, not elligible    Comments:

## 2017-05-21 NOTE — PROGRESS NOTE ADULT - SUBJECTIVE AND OBJECTIVE BOX
Improving  +BM  +Flatus    Vital Signs Last 24 Hrs  T(C): 36.8, Max: 37.1 (05-20 @ 16:51)  T(F): 98.2, Max: 98.8 (05-20 @ 16:51)  HR: 98 (81 - 100)  BP: 133/76 (124/77 - 155/87)  BP(mean): --  RR: 18 (16 - 18)  SpO2: 100% (100% - 100%)    Exam  Abd:  Soft.  Inc CDI.  No collection.  Drains SS    Plan  Ambulate  Continue binder and drains  Dispo per general surgery  Follow up with Dr. Nagel 1 week after DC home

## 2017-05-21 NOTE — PROGRESS NOTE ADULT - ATTENDING COMMENTS
NGT clamp trial  Awaiting GI fxn
NGT out yesterday  ADAT  OOB/Ambulate
I have seen and examined the patient, reviewed the laboratory and radiologic data and agree with practitioner's history, physical assessment, plan and reviewed and edited where appropriate.     Plan:  1) Awaiting bowel function  2) OOB/ambulate  3) D/C NGT in next 24 to 48 hrs

## 2017-05-21 NOTE — PROGRESS NOTE ADULT - SUBJECTIVE AND OBJECTIVE BOX
SUBJECTIVE:  Doing well.   No overnight events.   NGT and doan d/c'ed yesterday. Ambulating, voiding. Denies N/V. No GI function yet.    OBJECTIVE:     ** VITAL SIGNS / I&O's **    Vital Signs Last 24 Hrs  T(C): 36.4, Max: 37.1 (05-20 @ 16:51)  T(F): 97.5, Max: 98.8 (05-20 @ 16:51)  HR: 81 (81 - 100)  BP: 155/87 (124/77 - 155/87)  BP(mean): --  RR: 18 (16 - 18)  SpO2: 100% (100% - 100%)      I & Os for current day (as of 21 May 2017 08:00)  =============================================  IN:    dextrose 5% + sodium chloride 0.45% with potassium chloride 20 mEq/L: 400 ml    Total IN: 400 ml  ---------------------------------------------  OUT:    Indwelling Catheter - Urethral: 800 ml    Voided: 500 ml    Nasoenteral Tube: 50 ml    Bulb: 23 ml    Bulb: 19.5 ml    Bulb: 10 ml    Total OUT: 1402.5 ml  ---------------------------------------------  Total NET: -1002.5 ml      ** PHYSICAL EXAM **    -- CONSTITUTIONAL: AOx3. NAD.   -- ABDOMEN: soft, min distended, inc D/C/I, jps serosang      ** LABS **                          10.4   5.29  )-----------( 238      ( 21 May 2017 05:15 )             33.6     21 May 2017 05:15    143    |  105    |  8      ----------------------------<  115    4.1     |  28     |  0.56     Ca    8.9        21 May 2017 05:15  Phos  2.5       21 May 2017 05:15  Mg     2.0       21 May 2017 05:15    TPro  7.0    /  Alb  3.3    /  TBili  0.5    /  DBili  x      /  AST  39     /  ALT  25     /  AlkPhos  61     20 May 2017 06:12    PT/INR - ( 20 May 2017 06:12 )   PT: 12.6 SEC;   INR: 1.12          PTT - ( 20 May 2017 06:12 )  PTT:30.2 SEC  CAPILLARY BLOOD GLUCOSE            A/P: POD#  5    s/p repair of incisional hernia with b/l component separation    -awaiting GI function, diet as per primary team  -cont jps, abd binder  - Pain control  - IS  - Ambulate  - DVT prophylaxis

## 2017-05-21 NOTE — PROGRESS NOTE ADULT - SUBJECTIVE AND OBJECTIVE BOX
Surgical Oncology Progress Note    Procedure: BENJI, enterotomy -> jejuno-jejunostomy Calin-en-Y, anterior component separation  POD: 5 Surgical Oncology Progress Note    Procedure: BENJI, enterotomy -> jejuno-jejunostomy Calin-en-Y, anterior component separation  POD: 5    Overnight Events: no events, doing well.        Physical Exam  Vital Signs Last 24 Hrs  T(C): 36.4, Max: 37.1 (05-20 @ 16:51)  T(F): 97.5, Max: 98.8 (05-20 @ 16:51)  HR: 81 (81 - 100)  BP: 155/87 (124/77 - 155/87)  BP(mean): --  RR: 18 (16 - 18)  SpO2: 100% (100% - 100%)  Gen: NAD  HEENT: normocephalic, atraumatic, no scleral icterus  CV: S1, S2, RRR  Pulm: CTA B/L  Abd: Soft, ND, NTP, no rebound, no guarding, no palpable organomegaly/masses  Ext: warm, no edema, palp dp/pt  I&O's Detail  I & Os for 24h ending 21 May 2017 07:00  =============================================  IN:    dextrose 5% + sodium chloride 0.45% with potassium chloride 20 mEq/L: 400 ml    Total IN: 400 ml  ---------------------------------------------  OUT:    Indwelling Catheter - Urethral: 800 ml    Voided: 500 ml    Nasoenteral Tube: 50 ml    Bulb: 23 ml    Bulb: 19.5 ml    Bulb: 10 ml    Total OUT: 1402.5 ml  ---------------------------------------------  Total NET: -1002.5 ml    I & Os for current day (as of 21 May 2017 09:39)  =============================================  IN:    Total IN: 0 ml  ---------------------------------------------  OUT:    Voided: 100 ml    Total OUT: 100 ml  ---------------------------------------------  Total NET: -100 ml      Labs:                        10.4   5.29  )-----------( 238      ( 21 May 2017 05:15 )             33.6     05-21    143  |  105  |  8   ----------------------------<  115<H>  4.1   |  28  |  0.56    Ca    8.9      21 May 2017 05:15  Phos  2.5     05-21  Mg     2.0     05-21    TPro  7.0  /  Alb  3.3  /  TBili  0.5  /  DBili  x   /  AST  39<H>  /  ALT  25  /  AlkPhos  61  05-20    PT/INR - ( 20 May 2017 06:12 )   PT: 12.6 SEC;   INR: 1.12          PTT - ( 20 May 2017 06:12 )  PTT:30.2 SEC

## 2017-05-21 NOTE — PROGRESS NOTE ADULT - ASSESSMENT
61F s/p ex-lap, BENJI, enterotomy -> jejuno-jejunostomy Calin-en-Y, anterior component separation 61F s/p ex-lap, BENJI, enterotomy -> jejuno-jejunostomy Calin-en-Y, anterior component separation  - DVT PPx  - Out of Bed  - Incentive Spirometry  - Analgesia  - Diet: CLD  - HENRRY Care / Teaching  - Local Wound Care  - Serial Abdominal Exams  - Full Support in Place

## 2017-05-22 ENCOUNTER — TRANSCRIPTION ENCOUNTER (OUTPATIENT)
Age: 62
End: 2017-05-22

## 2017-05-22 VITALS
DIASTOLIC BLOOD PRESSURE: 73 MMHG | TEMPERATURE: 99 F | HEART RATE: 86 BPM | RESPIRATION RATE: 18 BRPM | SYSTOLIC BLOOD PRESSURE: 132 MMHG | OXYGEN SATURATION: 100 %

## 2017-05-22 LAB
BUN SERPL-MCNC: 8 MG/DL — SIGNIFICANT CHANGE UP (ref 7–23)
CALCIUM SERPL-MCNC: 9.1 MG/DL — SIGNIFICANT CHANGE UP (ref 8.4–10.5)
CHLORIDE SERPL-SCNC: 102 MMOL/L — SIGNIFICANT CHANGE UP (ref 98–107)
CO2 SERPL-SCNC: 27 MMOL/L — SIGNIFICANT CHANGE UP (ref 22–31)
CREAT SERPL-MCNC: 0.52 MG/DL — SIGNIFICANT CHANGE UP (ref 0.5–1.3)
GLUCOSE SERPL-MCNC: 117 MG/DL — HIGH (ref 70–99)
HCT VFR BLD CALC: 31 % — LOW (ref 34.5–45)
HGB BLD-MCNC: 9.7 G/DL — LOW (ref 11.5–15.5)
MAGNESIUM SERPL-MCNC: 1.9 MG/DL — SIGNIFICANT CHANGE UP (ref 1.6–2.6)
MCHC RBC-ENTMCNC: 28.7 PG — SIGNIFICANT CHANGE UP (ref 27–34)
MCHC RBC-ENTMCNC: 31.3 % — LOW (ref 32–36)
MCV RBC AUTO: 91.7 FL — SIGNIFICANT CHANGE UP (ref 80–100)
PHOSPHATE SERPL-MCNC: 3.3 MG/DL — SIGNIFICANT CHANGE UP (ref 2.5–4.5)
PLATELET # BLD AUTO: 247 K/UL — SIGNIFICANT CHANGE UP (ref 150–400)
PMV BLD: 9.8 FL — SIGNIFICANT CHANGE UP (ref 7–13)
POTASSIUM SERPL-MCNC: 4.1 MMOL/L — SIGNIFICANT CHANGE UP (ref 3.5–5.3)
POTASSIUM SERPL-SCNC: 4.1 MMOL/L — SIGNIFICANT CHANGE UP (ref 3.5–5.3)
RBC # BLD: 3.38 M/UL — LOW (ref 3.8–5.2)
RBC # FLD: 13.3 % — SIGNIFICANT CHANGE UP (ref 10.3–14.5)
SODIUM SERPL-SCNC: 141 MMOL/L — SIGNIFICANT CHANGE UP (ref 135–145)
WBC # BLD: 5.17 K/UL — SIGNIFICANT CHANGE UP (ref 3.8–10.5)
WBC # FLD AUTO: 5.17 K/UL — SIGNIFICANT CHANGE UP (ref 3.8–10.5)

## 2017-05-22 RX ORDER — PANTOPRAZOLE SODIUM 20 MG/1
40 TABLET, DELAYED RELEASE ORAL
Qty: 0 | Refills: 0 | Status: DISCONTINUED | OUTPATIENT
Start: 2017-05-22 | End: 2017-05-22

## 2017-05-22 RX ORDER — OXYCODONE HYDROCHLORIDE 5 MG/1
1 TABLET ORAL
Qty: 30 | Refills: 0
Start: 2017-05-22 | End: 2017-05-27

## 2017-05-22 RX ORDER — POLYETHYLENE GLYCOL 3350 17 G/17G
17 POWDER, FOR SOLUTION ORAL
Qty: 0 | Refills: 0 | COMMUNITY

## 2017-05-22 RX ORDER — SODIUM CHLORIDE 9 MG/ML
3 INJECTION INTRAMUSCULAR; INTRAVENOUS; SUBCUTANEOUS EVERY 8 HOURS
Qty: 0 | Refills: 0 | Status: DISCONTINUED | OUTPATIENT
Start: 2017-05-22 | End: 2017-05-22

## 2017-05-22 RX ORDER — ALPRAZOLAM 0.25 MG
1 TABLET ORAL
Qty: 0 | Refills: 0 | COMMUNITY

## 2017-05-22 RX ORDER — ACETAMINOPHEN 500 MG
2 TABLET ORAL
Qty: 0 | Refills: 0 | DISCHARGE
Start: 2017-05-22

## 2017-05-22 RX ORDER — PANTOPRAZOLE SODIUM 20 MG/1
1 TABLET, DELAYED RELEASE ORAL
Qty: 30 | Refills: 0
Start: 2017-05-22 | End: 2017-06-21

## 2017-05-22 RX ADMIN — ENOXAPARIN SODIUM 40 MILLIGRAM(S): 100 INJECTION SUBCUTANEOUS at 12:43

## 2017-05-22 RX ADMIN — OXYCODONE HYDROCHLORIDE 10 MILLIGRAM(S): 5 TABLET ORAL at 10:15

## 2017-05-22 RX ADMIN — OXYCODONE HYDROCHLORIDE 10 MILLIGRAM(S): 5 TABLET ORAL at 03:46

## 2017-05-22 RX ADMIN — DEXTROSE MONOHYDRATE, SODIUM CHLORIDE, AND POTASSIUM CHLORIDE 50 MILLILITER(S): 50; .745; 4.5 INJECTION, SOLUTION INTRAVENOUS at 10:18

## 2017-05-22 RX ADMIN — OXYCODONE HYDROCHLORIDE 10 MILLIGRAM(S): 5 TABLET ORAL at 00:49

## 2017-05-22 RX ADMIN — SODIUM CHLORIDE 3 MILLILITER(S): 9 INJECTION INTRAMUSCULAR; INTRAVENOUS; SUBCUTANEOUS at 13:49

## 2017-05-22 RX ADMIN — OXYCODONE HYDROCHLORIDE 10 MILLIGRAM(S): 5 TABLET ORAL at 04:46

## 2017-05-22 RX ADMIN — Medication 1 DROP(S): at 05:47

## 2017-05-22 RX ADMIN — Medication 1 DROP(S): at 13:49

## 2017-05-22 RX ADMIN — PANTOPRAZOLE SODIUM 40 MILLIGRAM(S): 20 TABLET, DELAYED RELEASE ORAL at 10:15

## 2017-05-22 RX ADMIN — OXYCODONE HYDROCHLORIDE 10 MILLIGRAM(S): 5 TABLET ORAL at 11:15

## 2017-05-22 RX ADMIN — OXYCODONE HYDROCHLORIDE 10 MILLIGRAM(S): 5 TABLET ORAL at 17:01

## 2017-05-22 RX ADMIN — OXYCODONE HYDROCHLORIDE 10 MILLIGRAM(S): 5 TABLET ORAL at 16:09

## 2017-05-22 NOTE — PROGRESS NOTE ADULT - PROVIDER SPECIALTY LIST ADULT
Anesthesia
Anesthesia
Pain Medicine
Plastic Surgery
Surgery
week(s)/2

## 2017-05-22 NOTE — PROGRESS NOTE ADULT - ASSESSMENT
61F s/p ex-lap, BEJNI, enterotomy -> jejuno-jejunostomy Calin-en-Y, anterior component separation  - DVT PPx  - Out of Bed  - Incentive Spirometry  - Analgesia  - Diet: CLD  - HENRRY Care / Teaching  - Local Wound Care 61F s/p ex-lap, BENJI, enterotomy -> jejuno-jejunostomy Calin-en-Y, anterior component separation  - dvt ppx  - Out of Bed  - Incentive Spirometry  - Analgesia  - Diet: Reg  - HENRRY Care / Teaching  - Local Wound Care  - f/u plastic surgery plan

## 2017-05-22 NOTE — PROGRESS NOTE ADULT - SUBJECTIVE AND OBJECTIVE BOX
*********Note incomplete pending morning rounds*****************    Surgical Oncology Progress Note  pager: [phone]    Procedure:  ex-lap, BENJI, enterotomy -> jejuno-jejunostomy Calin-en-Y, anterior component separation    POD: 6    S: tolerating CLD          Physical Exam  Gen: awake and alert, NAD  Abd: Surgical Oncology Progress Note  pager: [phone]    Procedure:  ex-lap, BENJI, enterotomy -> jejuno-jejunostomy Calin-en-Y, anterior component separation    POD: 6    S: tolerating CLD.  feeling "OK", flatus(+), bm(+), no n/v    Vital Signs Last 24 Hrs  T(C): 36.7, Max: 37.1 (05-21 @ 19:52)  T(F): 98, Max: 98.8 (05-21 @ 19:52)  HR: 95 (82 - 98)  BP: 142/90 (133/76 - 142/90)  BP(mean): --  RR: 16 (16 - 20)  SpO2: 100% (100% - 100%)    I&O's Detail    I & Os for current day (as of 22 May 2017 07:50)  =============================================  IN:    dextrose 5% + sodium chloride 0.45% with potassium chloride 20 mEq/L: 350 ml    Total IN: 350 ml  ---------------------------------------------  OUT:    Voided: 800 ml    Bulb: 25 ml    Bulb: 19.5 ml    Bulb: 10 ml    Total OUT: 854.5 ml  ---------------------------------------------  Total NET: -504.5 ml                            9.7    5.17  )-----------( 247      ( 22 May 2017 05:57 )             31.0       05-22    141  |  102  |  8   ----------------------------<  117<H>  4.1   |  27  |  0.52    Ca    9.1      22 May 2017 05:57  Phos  3.3     05-22  Mg     1.9     05-22        CAPILLARY BLOOD GLUCOSE                    Physical Exam  Gen: awake and alert, NAD  Abd: soft , nontender, nondistended, incision CDI, drains ss

## 2017-05-22 NOTE — DISCHARGE NOTE ADULT - HOME CARE AGENCY
[facility] [phone] the nurse will visit a day after discharge. The nurse will call prior to visit.

## 2017-05-22 NOTE — DISCHARGE NOTE ADULT - CARE PLAN
Principal Discharge DX:	Hernia  Goal:	wound healing  Instructions for follow-up, activity and diet:	WOUND CARE: Apply clean gauze and paper tape over midline incision site once a day.  Keep abdominal binder on.  Staples will be removed at your follow up appointment.  Apply clean gauze and paper tape over drain sites daily.  Please monitor and record HENRRY output daily.   BATHING: Please do not submerge wound underwater. You may shower and/or sponge bathe. It is OK to wash drain wound site.  ACTIVITY: No heavy lifting or straining. Otherwise, you may return to your usual level of physical activty. If you are taking narcotic pain medication (such as Percocet) DO NOT drive a car, operate machinery or make important decisions.  DIET: Low fiber diet, frequent small meals.  NOTIFY YOUR SURGEON IF: You have any bleeding that does not stop, any pus draining from your wound(s), any fever (over 100.4 F) or chills, persistent nausea/vomiting, persistent diarrhea, or if your pain is not controlled on your discharge pain medications.  FOLLOW-UP: Please follow up with your primary care physician in one week regarding your hospitalization.  Please follow up with Dr. Mauro in one week.  Please follow up with Dr. Nagel in one week. Principal Discharge DX:	Hernia  Goal:	wound healing  Instructions for follow-up, activity and diet:	WOUND CARE: Apply clean gauze and paper tape over midline incision site once a day.  Keep abdominal binder on.  Staples will be removed at your follow up appointment.  Apply clean gauze and paper tape over drain sites daily.  Please monitor and record HENRRY output daily.   BATHING: Please do not submerge wound underwater. You may shower and/or sponge bathe. It is OK to wash drain wound site.  ACTIVITY: No heavy lifting or straining. Otherwise, you may return to your usual level of physical activty. If you are taking narcotic pain medication (such as Percocet) DO NOT drive a car, operate machinery or make important decisions.  DIET: Low fiber diet, frequent small meals.  NOTIFY YOUR SURGEON IF: You have any bleeding that does not stop, any pus draining from your wound(s), any fever (over 100.4 F) or chills, persistent nausea/vomiting, persistent diarrhea, or if your pain is not controlled on your discharge pain medications.  FOLLOW-UP: Please follow up with your primary care physician in one week regarding your hospitalization.  Please follow up with Dr. Maruo in one week.  Please follow up with Dr. Nagel in one week.

## 2017-05-22 NOTE — DISCHARGE NOTE ADULT - PLAN OF CARE
wound healing WOUND CARE: Apply clean gauze and paper tape over midline incision site once a day.  Keep abdominal binder on.  Staples will be removed at your follow up appointment.  Apply clean gauze and paper tape over drain sites daily.  Please monitor and record HENRRY output daily.   BATHING: Please do not submerge wound underwater. You may shower and/or sponge bathe. It is OK to wash drain wound site.  ACTIVITY: No heavy lifting or straining. Otherwise, you may return to your usual level of physical activty. If you are taking narcotic pain medication (such as Percocet) DO NOT drive a car, operate machinery or make important decisions.  DIET: Low fiber diet, frequent small meals.  NOTIFY YOUR SURGEON IF: You have any bleeding that does not stop, any pus draining from your wound(s), any fever (over 100.4 F) or chills, persistent nausea/vomiting, persistent diarrhea, or if your pain is not controlled on your discharge pain medications.  FOLLOW-UP: Please follow up with your primary care physician in one week regarding your hospitalization.  Please follow up with Dr. Mauro in one week.  Please follow up with Dr. Nagel in one week.

## 2017-05-22 NOTE — DISCHARGE NOTE ADULT - ADDITIONAL INSTRUCTIONS
Please follow up with Dr. Nagel (plastic surgery), Dr. Mauro (general surgery), and your primary care physician.  Call to schedule appointments.

## 2017-05-22 NOTE — PROGRESS NOTE ADULT - SUBJECTIVE AND OBJECTIVE BOX
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 Patient doing well, having bowel function: passing gas and having bowel movements.       Objective:  T(C): 36.7, Max: 37.1 (05-21 @ 19:52)  HR: 95 (82 - 98)  BP: 142/90 (133/76 - 142/90)  RR: 16 (16 - 20)  SpO2: 100% (100% - 100%)  Wt(kg): --   05-21    143  |  105  |  8   ----------------------------<  115<H>  4.1   |  28  |  0.56    Ca    8.9      21 May 2017 05:15  Phos  2.5     05-21  Mg     2.0     05-21                          9.7    5.17  )-----------( 247      ( 22 May 2017 05:57 )             31.0     I & Os for 24h ending 05-21 @ 07:00  =============================================  IN: 400 ml / OUT: 1402.5 ml / NET: -1002.5 ml    I & Os for current day (as of 05-22 @ 06:42)  =============================================  IN: 350 ml / OUT: 854.5 ml / NET: -504.5 ml    PHYSICAL EXAM:    General:NAD, resting well in bed, Abdominal binder in place  Pulm: No increased work of breathing  Abd: Soft, nontender, nondistended. Incision clean dry and intact. HENRRY drains serosanguinous, outputs: 25/10/20            MEDICATIONS  (STANDING):  pantoprazole  Injectable 40milliGRAM(s) IV Push daily  dextrose 5% + sodium chloride 0.45% with potassium chloride 20 mEq/L 1000milliLiter(s) IV Continuous <Continuous>  artificial tears (preservative free) Ophthalmic Solution 1Drop(s) Right EYE three times a day  enoxaparin Injectable 40milliGRAM(s) SubCutaneous daily    MEDICATIONS  (PRN):  ondansetron Injectable 4milliGRAM(s) IV Push every 6 hours PRN Nausea  naloxone Injectable 0.1milliGRAM(s) IV Push every 3 minutes PRN For ANY of the following changes in patient status:  A. RR LESS THAN 10 breaths per minute, B. Oxygen saturation LESS THAN 90%, C. Sedation score of 6  acetaminophen   Tablet. 650milliGRAM(s) Oral every 6 hours PRN Mild Pain (1 - 3)  oxyCODONE IR 5milliGRAM(s) Oral every 4 hours PRN Moderate Pain (4 - 6)  oxyCODONE IR 10milliGRAM(s) Oral every 4 hours PRN Severe Pain (7 - 10)      Assessment/Plan:  Patient is a 61y old  Female who presents with a chief complaint of neoplasm of uncertain behavior of liver, gallbladder, or bile ducts  incisional hernia without obstruction or gangrene (02 May 2017 12:48). Doing well, having bowel function, incision healing.  - Diet per primary team  - Continue HENRRY drains  - Continue using abdominal binder  - OOB/ambulation  - DVT Ppx  - Cont. Abx

## 2017-05-22 NOTE — DISCHARGE NOTE ADULT - HOSPITAL COURSE
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 S/p Pet scan revealing a hernia. Pt denies nausea, vomiting. Reports occasional constipation.    Pt admitted to general surgery service and went to the OR with Dr. Mauro and Dr. Nagel on 5/16/17 for an exploratory laparotomy, ventral hernia repair (no mesh) with anterior component separation. Calin En Y Jejuno-Jejunostomy for enterotomy in afferent jejunal limb. 3 HENRRY drains left in place.  Pt tolerated procedure well, without complication.  Post op pt transferred from PACU to the surgical floor.  Pain control initially managed with PCEA and transitioned to PO as tolerated.  Hooker catheter removed 4 hrs after PCEA removed and pt passed TOV.  Pt initially with NGT/IVF hydration post op.  As GI function returned NGT clamp trial done and NGT removed on 5/21/17 and CLD started and advanced to low residue diet as tolerated.  HENRRY teaching provided.  Pt currently ambulating, voiding, tolerating a regular diet, with pain well controlled on PO pain meds.  Per team and attending, pt is hemodynamically stable for discharge home with HENRRY drains to follow up as an outpatient. 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 S/p Pet scan revealing a hernia. Pt denies nausea, vomiting. Reports occasional constipation.    Pt admitted to general surgery service and went to the OR with Dr. Mauro and Dr. Nagel on 5/16/17 for an exploratory laparotomy, ventral hernia repair (no mesh) with anterior component separation. Calin En Y Jejuno-Jejunostomy for enterotomy in afferent jejunal limb. 3 HENRRY drains left in place.  Pt tolerated procedure well, without complication.  Post op pt transferred from PACU to the surgical floor.  Pain control initially managed with PCEA and transitioned to PO as tolerated.  Hooker catheter removed 4 hrs after PCEA removed and pt passed TOV.  Pt initially with NGT/IVF hydration post op.  As GI function returned NGT clamp trial done and NGT removed on 5/21/17 and CLD started and advanced to low residue diet as tolerated.  HENRRY teaching provided.  Pt currently ambulating, voiding, tolerating a regular diet, with pain well controlled on PO pain meds.  Per team and attending, pt is hemodynamically stable for discharge home with home care and 3 HENRRY drains to follow up as an outpatient.     Reference #: [id number] 61 year old female with Hx of villous adenoma of gallbladder s/p whipple procedure in 2011, not requiring chemotherapy or radiation, developing right abdominal bulging and discomfort several months ago, diagnosed with incisional hernia. S/p Pet scan revealing a hernia. Pt denies nausea, vomiting. Reports occasional constipation.    Pt admitted to general surgery service and went to the OR with Dr. Mauro and Dr. Nagel on 5/16/17 for an exploratory laparotomy, ventral hernia repair (no mesh) with anterior component separation. Clain En Y Jejuno-Jejunostomy for enterotomy in afferent jejunal limb. 3 HENRRY drains left in place.  Pt tolerated procedure well, without complication.  Post op pt transferred from PACU to the surgical floor.  Pain control initially managed with PCEA and transitioned to PO as tolerated.  Hooker catheter removed 4 hrs after PCEA removed and pt passed TOV.  Pt initially with NGT/IVF hydration post op.  As GI function returned NGT clamp trial done and NGT removed on 5/21/17 and CLD started and advanced to low residue diet as tolerated.  HENRRY teaching provided.  Pt currently ambulating, voiding, tolerating a regular diet, with pain well controlled on PO pain meds.  Per team and attending, pt is hemodynamically stable for discharge home with home care and 3 HENRRY drains to follow up as an outpatient. Patient reports having percocet at home that was given pre-operatively.     Reference #: [id number]

## 2017-05-22 NOTE — DISCHARGE NOTE ADULT - MEDICATION SUMMARY - MEDICATIONS TO TAKE
I will START or STAY ON the medications listed below when I get home from the hospital:    acetaminophen 325 mg oral tablet  -- 2 tab(s) by mouth every 6 hours, As needed, Mild Pain (1 - 3)  -- Indication: For mild pain    escitalopram 10 mg oral tablet  -- 1 tab(s) by mouth once a day in am  -- Indication: For Home medication    pantoprazole 40 mg oral delayed release tablet  -- 1 tab(s) by mouth once a day (before a meal)  -- Indication: For proton pump inhibitor    Vitamin D2 50,000 intl units (1.25 mg) oral capsule  -- 1 cap(s) by mouth once a week on Friday  -- Indication: For supplement I will START or STAY ON the medications listed below when I get home from the hospital:    acetaminophen 325 mg oral tablet  -- 2 tab(s) by mouth every 6 hours, As needed, Mild Pain (1 - 3)  -- Indication: For mild pain    oxyCODONE 5 mg oral tablet  -- 1-2 tab(s) by mouth every 4 hours, As needed, Moderate Pain (4 - 6) MDD:8tabs  -- Indication: For moderate pain    escitalopram 10 mg oral tablet  -- 1 tab(s) by mouth once a day in am  -- Indication: For Home medication    pantoprazole 40 mg oral delayed release tablet  -- 1 tab(s) by mouth once a day (before a meal)  -- Indication: For proton pump inhibitor    Vitamin D2 50,000 intl units (1.25 mg) oral capsule  -- 1 cap(s) by mouth once a week on Friday  -- Indication: For supplement

## 2017-05-22 NOTE — DISCHARGE NOTE ADULT - CARE PROVIDERS DIRECT ADDRESSES
[email or web address]

## 2017-05-22 NOTE — DISCHARGE NOTE ADULT - CARE PROVIDER_API CALL
Damion Mauro), ColonRectal Surgery; Surgery  [address] [place] [address]  Phone: [phone]  Fax: [phone]    Amos Nagel), Plastic Surgery  [address] [address]  Phone: [phone]  Fax: [phone]

## 2017-05-24 LAB — SURGICAL PATHOLOGY STUDY: SIGNIFICANT CHANGE UP

## 2017-05-31 ENCOUNTER — TRANSCRIPTION ENCOUNTER (OUTPATIENT)
Age: 62
End: 2017-05-31

## 2017-06-19 ENCOUNTER — APPOINTMENT (OUTPATIENT)
Dept: SURGICAL ONCOLOGY | Facility: CLINIC | Age: 62
End: 2017-06-19

## 2017-06-19 VITALS
RESPIRATION RATE: 16 BRPM | SYSTOLIC BLOOD PRESSURE: 131 MMHG | DIASTOLIC BLOOD PRESSURE: 84 MMHG | BODY MASS INDEX: 17.94 KG/M2 | HEIGHT: 61 IN | HEART RATE: 85 BPM | WEIGHT: 95 LBS | OXYGEN SATURATION: 99 %

## 2017-07-15 ENCOUNTER — EMERGENCY (EMERGENCY)
Facility: HOSPITAL | Age: 62
LOS: 1 days | Discharge: HOME | End: 2017-07-15
Attending: EMERGENCY MEDICINE
Payer: MEDICAID

## 2017-07-15 VITALS
WEIGHT: 95.9 LBS | DIASTOLIC BLOOD PRESSURE: 109 MMHG | OXYGEN SATURATION: 99 % | TEMPERATURE: 97 F | SYSTOLIC BLOOD PRESSURE: 146 MMHG | RESPIRATION RATE: 20 BRPM | HEIGHT: 62 IN | HEART RATE: 122 BPM

## 2017-07-15 VITALS
DIASTOLIC BLOOD PRESSURE: 87 MMHG | RESPIRATION RATE: 18 BRPM | SYSTOLIC BLOOD PRESSURE: 154 MMHG | HEART RATE: 85 BPM | OXYGEN SATURATION: 100 % | TEMPERATURE: 98 F

## 2017-07-15 DIAGNOSIS — Z90.49 ACQUIRED ABSENCE OF OTHER SPECIFIED PARTS OF DIGESTIVE TRACT: ICD-10-CM

## 2017-07-15 DIAGNOSIS — Z85.07 PERSONAL HISTORY OF MALIGNANT NEOPLASM OF PANCREAS: Chronic | ICD-10-CM

## 2017-07-15 DIAGNOSIS — Z85.07 PERSONAL HISTORY OF MALIGNANT NEOPLASM OF PANCREAS: ICD-10-CM

## 2017-07-15 DIAGNOSIS — E87.6 HYPOKALEMIA: ICD-10-CM

## 2017-07-15 DIAGNOSIS — F32.89 OTHER SPECIFIED DEPRESSIVE EPISODES: ICD-10-CM

## 2017-07-15 DIAGNOSIS — G51.0 BELL'S PALSY: Chronic | ICD-10-CM

## 2017-07-15 DIAGNOSIS — F41.9 ANXIETY DISORDER, UNSPECIFIED: ICD-10-CM

## 2017-07-15 DIAGNOSIS — Z88.5 ALLERGY STATUS TO NARCOTIC AGENT: ICD-10-CM

## 2017-07-15 LAB
ANION GAP SERPL CALC-SCNC: 8 MMOL/L — SIGNIFICANT CHANGE UP (ref 5–17)
ANION GAP SERPL CALC-SCNC: 8 MMOL/L — SIGNIFICANT CHANGE UP (ref 5–17)
BASOPHILS # BLD AUTO: 0.1 K/UL — SIGNIFICANT CHANGE UP (ref 0–0.2)
BASOPHILS NFR BLD AUTO: 1.2 % — SIGNIFICANT CHANGE UP (ref 0–2)
BUN SERPL-MCNC: 7 MG/DL — SIGNIFICANT CHANGE UP (ref 7–18)
BUN SERPL-MCNC: 8 MG/DL — SIGNIFICANT CHANGE UP (ref 7–18)
CALCIUM SERPL-MCNC: 8.3 MG/DL — LOW (ref 8.4–10.5)
CALCIUM SERPL-MCNC: 9.1 MG/DL — SIGNIFICANT CHANGE UP (ref 8.4–10.5)
CHLORIDE SERPL-SCNC: 102 MMOL/L — SIGNIFICANT CHANGE UP (ref 96–108)
CHLORIDE SERPL-SCNC: 99 MMOL/L — SIGNIFICANT CHANGE UP (ref 96–108)
CO2 SERPL-SCNC: 27 MMOL/L — SIGNIFICANT CHANGE UP (ref 22–31)
CO2 SERPL-SCNC: 27 MMOL/L — SIGNIFICANT CHANGE UP (ref 22–31)
CREAT SERPL-MCNC: 0.44 MG/DL — LOW (ref 0.5–1.3)
CREAT SERPL-MCNC: SIGNIFICANT CHANGE UP MG/DL (ref 0.5–1.3)
EOSINOPHIL # BLD AUTO: 0 K/UL — SIGNIFICANT CHANGE UP (ref 0–0.5)
EOSINOPHIL NFR BLD AUTO: 0.3 % — SIGNIFICANT CHANGE UP (ref 0–6)
GLUCOSE SERPL-MCNC: 84 MG/DL — SIGNIFICANT CHANGE UP (ref 70–99)
GLUCOSE SERPL-MCNC: 88 MG/DL — SIGNIFICANT CHANGE UP (ref 70–99)
HCT VFR BLD CALC: 38.6 % — SIGNIFICANT CHANGE UP (ref 34.5–45)
HGB BLD-MCNC: 12.8 G/DL — SIGNIFICANT CHANGE UP (ref 11.5–15.5)
LYMPHOCYTES # BLD AUTO: 0.9 K/UL — LOW (ref 1–3.3)
LYMPHOCYTES # BLD AUTO: 14.2 % — SIGNIFICANT CHANGE UP (ref 13–44)
MCHC RBC-ENTMCNC: 28.5 PG — SIGNIFICANT CHANGE UP (ref 27–34)
MCHC RBC-ENTMCNC: 33.1 GM/DL — SIGNIFICANT CHANGE UP (ref 32–36)
MCV RBC AUTO: 86 FL — SIGNIFICANT CHANGE UP (ref 80–100)
MONOCYTES # BLD AUTO: 0.3 K/UL — SIGNIFICANT CHANGE UP (ref 0–0.9)
MONOCYTES NFR BLD AUTO: 5.4 % — SIGNIFICANT CHANGE UP (ref 2–14)
NEUTROPHILS # BLD AUTO: 5.1 K/UL — SIGNIFICANT CHANGE UP (ref 1.8–7.4)
NEUTROPHILS NFR BLD AUTO: 78.9 % — HIGH (ref 43–77)
PLATELET # BLD AUTO: 304 K/UL — SIGNIFICANT CHANGE UP (ref 150–400)
POTASSIUM SERPL-MCNC: 3.1 MMOL/L — LOW (ref 3.5–5.3)
POTASSIUM SERPL-MCNC: SIGNIFICANT CHANGE UP MMOL/L (ref 3.5–5.3)
POTASSIUM SERPL-SCNC: 3.1 MMOL/L — LOW (ref 3.5–5.3)
POTASSIUM SERPL-SCNC: SIGNIFICANT CHANGE UP MMOL/L (ref 3.5–5.3)
RBC # BLD: 4.49 M/UL — SIGNIFICANT CHANGE UP (ref 3.8–5.2)
RBC # FLD: 12.5 % — SIGNIFICANT CHANGE UP (ref 10.3–14.5)
SODIUM SERPL-SCNC: 134 MMOL/L — LOW (ref 135–145)
SODIUM SERPL-SCNC: 137 MMOL/L — SIGNIFICANT CHANGE UP (ref 135–145)
TROPONIN I SERPL-MCNC: SIGNIFICANT CHANGE UP NG/ML (ref 0–0.04)
WBC # BLD: 6.5 K/UL — SIGNIFICANT CHANGE UP (ref 3.8–10.5)
WBC # FLD AUTO: 6.5 K/UL — SIGNIFICANT CHANGE UP (ref 3.8–10.5)

## 2017-07-15 PROCEDURE — 85027 COMPLETE CBC AUTOMATED: CPT

## 2017-07-15 PROCEDURE — 36415 COLL VENOUS BLD VENIPUNCTURE: CPT

## 2017-07-15 PROCEDURE — 96374 THER/PROPH/DIAG INJ IV PUSH: CPT

## 2017-07-15 PROCEDURE — 84484 ASSAY OF TROPONIN QUANT: CPT

## 2017-07-15 PROCEDURE — 99284 EMERGENCY DEPT VISIT MOD MDM: CPT | Mod: 25

## 2017-07-15 PROCEDURE — 80048 BASIC METABOLIC PNL TOTAL CA: CPT

## 2017-07-15 PROCEDURE — 93005 ELECTROCARDIOGRAM TRACING: CPT

## 2017-07-15 PROCEDURE — 99285 EMERGENCY DEPT VISIT HI MDM: CPT

## 2017-07-15 RX ORDER — POTASSIUM CHLORIDE 20 MEQ
40 PACKET (EA) ORAL ONCE
Qty: 0 | Refills: 0 | Status: COMPLETED | OUTPATIENT
Start: 2017-07-15 | End: 2017-07-15

## 2017-07-15 RX ORDER — SODIUM CHLORIDE 9 MG/ML
1000 INJECTION INTRAMUSCULAR; INTRAVENOUS; SUBCUTANEOUS ONCE
Qty: 0 | Refills: 0 | Status: COMPLETED | OUTPATIENT
Start: 2017-07-15 | End: 2017-07-15

## 2017-07-15 RX ADMIN — SODIUM CHLORIDE 1000 MILLILITER(S): 9 INJECTION INTRAMUSCULAR; INTRAVENOUS; SUBCUTANEOUS at 05:12

## 2017-07-15 RX ADMIN — Medication 2 MILLIGRAM(S): at 15:25

## 2017-07-15 RX ADMIN — Medication 40 MILLIEQUIVALENT(S): at 18:02

## 2017-07-15 NOTE — ED PROVIDER NOTE - MEDICAL DECISION MAKING DETAILS
63 y/o female c/o anxiety x yesterday. pt has no suicidal or homicidal ideations. Will given Benzos, check labs and reassess.

## 2017-07-15 NOTE — ED PROVIDER NOTE - OBJECTIVE STATEMENT
63 y/o female with a PMHx of anxiety and depression presents to ED, BIB sister c/o inability to sleep last night 2/2 anxiety. Sister notes the pt has not been able to take her medications nad has been feeling anxious all morning. She denies any hallucinations or any other symptoms at the moment.

## 2017-08-14 ENCOUNTER — INPATIENT (INPATIENT)
Facility: HOSPITAL | Age: 62
LOS: 2 days | Discharge: HOME | End: 2017-08-17
Attending: HOSPITALIST | Admitting: HOSPITALIST
Payer: MEDICAID

## 2017-08-14 VITALS
HEART RATE: 93 BPM | OXYGEN SATURATION: 99 % | RESPIRATION RATE: 18 BRPM | DIASTOLIC BLOOD PRESSURE: 85 MMHG | TEMPERATURE: 98 F | SYSTOLIC BLOOD PRESSURE: 118 MMHG

## 2017-08-14 DIAGNOSIS — F32.9 MAJOR DEPRESSIVE DISORDER, SINGLE EPISODE, UNSPECIFIED: ICD-10-CM

## 2017-08-14 DIAGNOSIS — R10.9 UNSPECIFIED ABDOMINAL PAIN: ICD-10-CM

## 2017-08-14 DIAGNOSIS — R63.0 ANOREXIA: ICD-10-CM

## 2017-08-14 DIAGNOSIS — Z85.07 PERSONAL HISTORY OF MALIGNANT NEOPLASM OF PANCREAS: Chronic | ICD-10-CM

## 2017-08-14 DIAGNOSIS — G51.0 BELL'S PALSY: Chronic | ICD-10-CM

## 2017-08-14 DIAGNOSIS — F41.9 ANXIETY DISORDER, UNSPECIFIED: ICD-10-CM

## 2017-08-14 LAB
ALBUMIN SERPL ELPH-MCNC: 3.7 G/DL — SIGNIFICANT CHANGE UP (ref 3.3–5)
ALP SERPL-CCNC: 62 U/L — SIGNIFICANT CHANGE UP (ref 40–120)
ALT FLD-CCNC: 8 U/L — SIGNIFICANT CHANGE UP (ref 4–33)
APAP SERPL-MCNC: < 15 UG/ML — LOW (ref 15–25)
APPEARANCE UR: SIGNIFICANT CHANGE UP
AST SERPL-CCNC: 16 U/L — SIGNIFICANT CHANGE UP (ref 4–32)
BACTERIA # UR AUTO: SIGNIFICANT CHANGE UP
BARBITURATES MEASUREMENT: NEGATIVE — SIGNIFICANT CHANGE UP
BASE EXCESS BLDV CALC-SCNC: 3.1 MMOL/L — SIGNIFICANT CHANGE UP
BASOPHILS # BLD AUTO: 0.04 K/UL — SIGNIFICANT CHANGE UP (ref 0–0.2)
BASOPHILS NFR BLD AUTO: 0.6 % — SIGNIFICANT CHANGE UP (ref 0–2)
BENZODIAZ SERPL-MCNC: NEGATIVE — SIGNIFICANT CHANGE UP
BILIRUB SERPL-MCNC: 0.4 MG/DL — SIGNIFICANT CHANGE UP (ref 0.2–1.2)
BILIRUB UR-MCNC: NEGATIVE — SIGNIFICANT CHANGE UP
BLOOD GAS VENOUS - CREATININE: 0.63 MG/DL — SIGNIFICANT CHANGE UP (ref 0.5–1.3)
BLOOD UR QL VISUAL: HIGH
BUN SERPL-MCNC: 21 MG/DL — SIGNIFICANT CHANGE UP (ref 7–23)
CALCIUM SERPL-MCNC: 9.6 MG/DL — SIGNIFICANT CHANGE UP (ref 8.4–10.5)
CHLORIDE BLDV-SCNC: 99 MMOL/L — SIGNIFICANT CHANGE UP (ref 96–108)
CHLORIDE SERPL-SCNC: 96 MMOL/L — LOW (ref 98–107)
CK MB BLD-MCNC: 2.29 NG/ML — SIGNIFICANT CHANGE UP (ref 1–4.7)
CK MB BLD-MCNC: SIGNIFICANT CHANGE UP (ref 0–2.5)
CK SERPL-CCNC: 29 U/L — SIGNIFICANT CHANGE UP (ref 25–170)
CO2 SERPL-SCNC: 27 MMOL/L — SIGNIFICANT CHANGE UP (ref 22–31)
COLOR SPEC: SIGNIFICANT CHANGE UP
CREAT SERPL-MCNC: 0.71 MG/DL — SIGNIFICANT CHANGE UP (ref 0.5–1.3)
EOSINOPHIL # BLD AUTO: 0.02 K/UL — SIGNIFICANT CHANGE UP (ref 0–0.5)
EOSINOPHIL NFR BLD AUTO: 0.3 % — SIGNIFICANT CHANGE UP (ref 0–6)
ETHANOL BLD-MCNC: < 10 MG/DL — SIGNIFICANT CHANGE UP
GAS PNL BLDV: 137 MMOL/L — SIGNIFICANT CHANGE UP (ref 136–146)
GLUCOSE BLDV-MCNC: 77 — SIGNIFICANT CHANGE UP (ref 70–99)
GLUCOSE SERPL-MCNC: 81 MG/DL — SIGNIFICANT CHANGE UP (ref 70–99)
GLUCOSE UR-MCNC: NEGATIVE — SIGNIFICANT CHANGE UP
HCO3 BLDV-SCNC: 26 MMOL/L — SIGNIFICANT CHANGE UP (ref 20–27)
HCT VFR BLD CALC: 36.4 % — SIGNIFICANT CHANGE UP (ref 34.5–45)
HCT VFR BLDV CALC: 35.8 % — SIGNIFICANT CHANGE UP (ref 34.5–45)
HGB BLD-MCNC: 11.5 G/DL — SIGNIFICANT CHANGE UP (ref 11.5–15.5)
HGB BLDV-MCNC: 11.6 G/DL — SIGNIFICANT CHANGE UP (ref 11.5–15.5)
IMM GRANULOCYTES # BLD AUTO: 0.03 # — SIGNIFICANT CHANGE UP
IMM GRANULOCYTES NFR BLD AUTO: 0.5 % — SIGNIFICANT CHANGE UP (ref 0–1.5)
KETONES UR-MCNC: SIGNIFICANT CHANGE UP
LACTATE BLDV-MCNC: 1.5 MMOL/L — SIGNIFICANT CHANGE UP (ref 0.5–2)
LEUKOCYTE ESTERASE UR-ACNC: SIGNIFICANT CHANGE UP
LYMPHOCYTES # BLD AUTO: 1.27 K/UL — SIGNIFICANT CHANGE UP (ref 1–3.3)
LYMPHOCYTES # BLD AUTO: 19.6 % — SIGNIFICANT CHANGE UP (ref 13–44)
MCHC RBC-ENTMCNC: 26.9 PG — LOW (ref 27–34)
MCHC RBC-ENTMCNC: 31.6 % — LOW (ref 32–36)
MCV RBC AUTO: 85.2 FL — SIGNIFICANT CHANGE UP (ref 80–100)
MONOCYTES # BLD AUTO: 0.31 K/UL — SIGNIFICANT CHANGE UP (ref 0–0.9)
MONOCYTES NFR BLD AUTO: 4.8 % — SIGNIFICANT CHANGE UP (ref 2–14)
MUCOUS THREADS # UR AUTO: SIGNIFICANT CHANGE UP
NEUTROPHILS # BLD AUTO: 4.82 K/UL — SIGNIFICANT CHANGE UP (ref 1.8–7.4)
NEUTROPHILS NFR BLD AUTO: 74.2 % — SIGNIFICANT CHANGE UP (ref 43–77)
NITRITE UR-MCNC: NEGATIVE — SIGNIFICANT CHANGE UP
NRBC # FLD: 0 — SIGNIFICANT CHANGE UP
PCO2 BLDV: 43 MMHG — SIGNIFICANT CHANGE UP (ref 41–51)
PH BLDV: 7.42 PH — SIGNIFICANT CHANGE UP (ref 7.32–7.43)
PH UR: 6 — SIGNIFICANT CHANGE UP (ref 4.6–8)
PLATELET # BLD AUTO: 309 K/UL — SIGNIFICANT CHANGE UP (ref 150–400)
PMV BLD: 10.1 FL — SIGNIFICANT CHANGE UP (ref 7–13)
PO2 BLDV: < 24 MMHG — LOW (ref 35–40)
POTASSIUM BLDV-SCNC: 3.7 MMOL/L — SIGNIFICANT CHANGE UP (ref 3.4–4.5)
POTASSIUM SERPL-MCNC: 4 MMOL/L — SIGNIFICANT CHANGE UP (ref 3.5–5.3)
POTASSIUM SERPL-SCNC: 4 MMOL/L — SIGNIFICANT CHANGE UP (ref 3.5–5.3)
PROT SERPL-MCNC: 7.2 G/DL — SIGNIFICANT CHANGE UP (ref 6–8.3)
PROT UR-MCNC: NEGATIVE — SIGNIFICANT CHANGE UP
RBC # BLD: 4.27 M/UL — SIGNIFICANT CHANGE UP (ref 3.8–5.2)
RBC # FLD: 13.3 % — SIGNIFICANT CHANGE UP (ref 10.3–14.5)
RBC CASTS # UR COMP ASSIST: SIGNIFICANT CHANGE UP (ref 0–?)
SALICYLATES SERPL-MCNC: < 5 MG/DL — LOW (ref 15–30)
SAO2 % BLDV: 31.1 % — LOW (ref 60–85)
SODIUM SERPL-SCNC: 138 MMOL/L — SIGNIFICANT CHANGE UP (ref 135–145)
SP GR SPEC: 1.01 — SIGNIFICANT CHANGE UP (ref 1–1.03)
SQUAMOUS # UR AUTO: SIGNIFICANT CHANGE UP
TROPONIN T SERPL-MCNC: < 0.06 NG/ML — SIGNIFICANT CHANGE UP (ref 0–0.06)
UROBILINOGEN FLD QL: NORMAL E.U. — SIGNIFICANT CHANGE UP (ref 0.1–0.2)
WBC # BLD: 6.49 K/UL — SIGNIFICANT CHANGE UP (ref 3.8–10.5)
WBC # FLD AUTO: 6.49 K/UL — SIGNIFICANT CHANGE UP (ref 3.8–10.5)
WBC UR QL: SIGNIFICANT CHANGE UP (ref 0–?)

## 2017-08-14 PROCEDURE — 99223 1ST HOSP IP/OBS HIGH 75: CPT | Mod: GC

## 2017-08-14 PROCEDURE — 74177 CT ABD & PELVIS W/CONTRAST: CPT | Mod: 26

## 2017-08-14 RX ORDER — SODIUM CHLORIDE 9 MG/ML
1000 INJECTION INTRAMUSCULAR; INTRAVENOUS; SUBCUTANEOUS ONCE
Qty: 0 | Refills: 0 | Status: COMPLETED | OUTPATIENT
Start: 2017-08-14 | End: 2017-08-14

## 2017-08-14 RX ORDER — CIPROFLOXACIN LACTATE 400MG/40ML
400 VIAL (ML) INTRAVENOUS ONCE
Qty: 0 | Refills: 0 | Status: COMPLETED | OUTPATIENT
Start: 2017-08-14 | End: 2017-08-14

## 2017-08-14 RX ORDER — METRONIDAZOLE 500 MG
500 TABLET ORAL EVERY 6 HOURS
Qty: 0 | Refills: 0 | Status: DISCONTINUED | OUTPATIENT
Start: 2017-08-14 | End: 2017-08-15

## 2017-08-14 RX ORDER — ALPRAZOLAM 0.25 MG
0.25 TABLET ORAL ONCE
Qty: 0 | Refills: 0 | Status: DISCONTINUED | OUTPATIENT
Start: 2017-08-14 | End: 2017-08-15

## 2017-08-14 RX ORDER — SODIUM CHLORIDE 9 MG/ML
500 INJECTION INTRAMUSCULAR; INTRAVENOUS; SUBCUTANEOUS ONCE
Qty: 0 | Refills: 0 | Status: COMPLETED | OUTPATIENT
Start: 2017-08-14 | End: 2017-08-14

## 2017-08-14 RX ORDER — LANOLIN ALCOHOL/MO/W.PET/CERES
3 CREAM (GRAM) TOPICAL ONCE
Qty: 0 | Refills: 0 | Status: COMPLETED | OUTPATIENT
Start: 2017-08-14 | End: 2017-08-14

## 2017-08-14 RX ORDER — MORPHINE SULFATE 50 MG/1
4 CAPSULE, EXTENDED RELEASE ORAL ONCE
Qty: 0 | Refills: 0 | Status: DISCONTINUED | OUTPATIENT
Start: 2017-08-14 | End: 2017-08-14

## 2017-08-14 RX ORDER — CIPROFLOXACIN LACTATE 400MG/40ML
500 VIAL (ML) INTRAVENOUS EVERY 12 HOURS
Qty: 0 | Refills: 0 | Status: DISCONTINUED | OUTPATIENT
Start: 2017-08-14 | End: 2017-08-15

## 2017-08-14 RX ORDER — METRONIDAZOLE 500 MG
500 TABLET ORAL ONCE
Qty: 0 | Refills: 0 | Status: COMPLETED | OUTPATIENT
Start: 2017-08-14 | End: 2017-08-14

## 2017-08-14 RX ORDER — CIPROFLOXACIN LACTATE 400MG/40ML
VIAL (ML) INTRAVENOUS
Qty: 0 | Refills: 0 | Status: DISCONTINUED | OUTPATIENT
Start: 2017-08-14 | End: 2017-08-14

## 2017-08-14 RX ADMIN — Medication 100 MILLIGRAM(S): at 19:13

## 2017-08-14 RX ADMIN — Medication 200 MILLIGRAM(S): at 18:09

## 2017-08-14 RX ADMIN — Medication 1 APPLICATION(S): at 22:36

## 2017-08-14 RX ADMIN — Medication 3 MILLIGRAM(S): at 22:37

## 2017-08-14 RX ADMIN — Medication 0.5 MILLIGRAM(S): at 13:51

## 2017-08-14 RX ADMIN — SODIUM CHLORIDE 1000 MILLILITER(S): 9 INJECTION INTRAMUSCULAR; INTRAVENOUS; SUBCUTANEOUS at 13:51

## 2017-08-14 NOTE — ED PROVIDER NOTE - OBJECTIVE STATEMENT
63 yo female with h/o pancreatic ca s/p whipple procedure in 2011 with complication of incisional hernia repaired one month ago, depression, anxiety, bells palsy present to the ED c/o abdominal pain, anorexia, shaking, anxiety worse over the past few day. Pt sister at bedside states patient is going through a "nervous breakdown", family recently took pt to another hospital where a CT was done and normal, pt was taken to psychiatrist recently and her dose of xanax was changed from 1mg to 0.5mg. Family also tried to take pt to rehab center and was told she needed to come to ED first. Admits to some cloudy urine/ hematuria. Pt denies n/v, diarrhea, cp, sob, SI,HI, AVH. LBM was x 2 days ago.  Pt has been on lexapro in the past but has not taken in "months"

## 2017-08-14 NOTE — H&P ADULT - HISTORY OF PRESENT ILLNESS
63 yo female with h/o pancreatic ca s/p whipple procedure in 2011 with complication of incisional hernia repaired May 2017, depression, anxiety, bells palsy present to the ED c/o abdominal pain, anorexia, shaking, anxiety worse over the past few day. Abdominal pain started after the surgery in May 2017. She describes it as tightness with no radiation. Pt sister at bedside states patient is going through a "nervous breakdown." Pt was taken to psychiatrist recently and her dose of xanax was changed from 1mg to 0.5mg, patient states she was taking half a pill. Family also tried to take pt to rehab center because of concern for addiction and was told she needed to come to ED first. Pt denies n/v, diarrhea, chest pain, SOB, SI,HI, AVH. LBM was x 2 days ago. Patient does state chest pain and SOB when she is having anxiety.

## 2017-08-14 NOTE — H&P ADULT - FAMILY HISTORY
<<-----Click on this checkbox to enter Family History Family history of COPD (chronic obstructive pulmonary disease)     Family history of hypertension     Sibling  Still living? Yes, Estimated age: Age Unknown  Family history of hypertension, Age at diagnosis: Age Unknown

## 2017-08-14 NOTE — H&P ADULT - PROBLEM SELECTOR PLAN 3
-Has been on Xanax for several years 1mg, but was taking half a pill  -Recently Rx 0.5mg and was taking half a day  -Received Ativan 0.5mg in ED  -Xanax 0.25mg PRN if develops tremors  -Psychiatry will be consulted

## 2017-08-14 NOTE — PATIENT PROFILE ADULT. - NUTRITION PROFILE
unintentional weight loss/nausea/vomiting greater or equal to 3 days prior to admit/poor appetite/BMI less than 18

## 2017-08-14 NOTE — H&P ADULT - NSHPPHYSICALEXAM_GEN_ALL_CORE
PHYSICAL EXAM:  GENERAL: NAD, cachectic, R facial droop  EYES: EOMI, PERRLA, conjunctiva and sclera clear  NECK: Supple, Normal thyroid  CHEST/LUNG: Clear to percussion bilaterally; No rales, rhonchi, wheezing, or rubs  HEART: Regular rate and rhythm; No murmurs, rubs, or gallops  ABDOMEN: Soft, Nontender, Nondistended; Bowel sounds present; Scar in midline well healed  EXTREMITIES:  2+ Peripheral Pulses, No clubbing, cyanosis, or edema  SKIN: No rashes or lesions  NERVOUS SYSTEM:  Alert & Oriented X3

## 2017-08-14 NOTE — H&P ADULT - PROBLEM SELECTOR PLAN 1
-Most likely due to Colitlis  -Received Ciprofloxacin 400mg  -Received Metronidazole 500mg -Most likely due to Colitlis  -c/w Ciprofloxacin 500mg PO  -c/w Metronidazole 500mg PO

## 2017-08-14 NOTE — H&P ADULT - NSHPREVIEWOFSYSTEMS_GEN_ALL_CORE
REVIEW OF SYSTEMS:    CONSTITUTIONAL: No weakness, fevers or chills  EYES/ENT: No visual changes;  No vertigo or throat pain   NECK: No pain or stiffness  RESPIRATORY: No cough, wheezing, hemoptysis; No shortness of breath  CARDIOVASCULAR: No chest pain or palpitations  GASTROINTESTINAL: abdominal pain. No nausea, vomiting, or hematemesis; No diarrhea or constipation. No melena or hematochezia.  GENITOURINARY: No dysuria, frequency or hematuria  NEUROLOGICAL: No numbness or weakness  SKIN: No itching, rashes

## 2017-08-14 NOTE — H&P ADULT - NSHPSOCIALHISTORY_GEN_ALL_CORE
Social History:    Marital Status:  (   )    ( X  ) Single    (   )    (  )   Occupation:   Lives with: (  ) alone  (  ) children   (  ) spouse   (  ) parents  ( X) other: sister    Substance Use (street drugs): ( X) never used  (  ) other:  Tobacco Usage:  (  X ) never smoked   (   ) former smoker   (   ) current smoker  (     ) pack year  (        ) last cigarette date  Alcohol Usage: Denies EtOH

## 2017-08-14 NOTE — ED PROVIDER NOTE - CARE PLAN
Principal Discharge DX:	Abdominal pain, unspecified location Principal Discharge DX:	Abdominal pain, unspecified location  Secondary Diagnosis:	Anorexia  Secondary Diagnosis:	Failure to thrive in adult

## 2017-08-14 NOTE — H&P ADULT - ATTENDING COMMENTS
this unfortunate woman has had abd pain chronically (since a whipple for biliary tumor) and now presents with anxiety, decreased appetite, insomnia. while her sx's seem mostly psychiatric in etiology, it behooves us to look for an organic/medical reason. we will check TSH, drug screen, electrolytes and assess this ? of colitis.    taper benzo's, ask psych to evaluate.

## 2017-08-14 NOTE — ED ADULT TRIAGE NOTE - CHIEF COMPLAINT QUOTE
Pt c/o abd pain, HA, shaking x 2 days. As per sister, pt takes alprazolam, 5 days ago dose was decreased from 1mg to 0.5. Pt now presents with mild tremors, HA, tachycardic, weak in triage. Family was told by a rehab center to request detox. Denies N/V. Hx: Depression, anxiety,

## 2017-08-14 NOTE — ED ADULT NURSE NOTE - OBJECTIVE STATEMENT
pt brought in by family requesting detox. stating her ativan dosage was recently decreased. c/o abd pain with no appetite. reports feeling slightly tremulous. pt is weak appearing. + abd tenderness to palpation. reports last BM was 3 days ago. also c/o dark foul smelling urine. IV inserted. labs sent. VSS. awaiting further orders will monitor.

## 2017-08-14 NOTE — ED PROVIDER NOTE - ATTENDING CONTRIBUTION TO CARE
Dr. Palacios: I performed a face to face bedside interview with patient regarding history of present illness, review of symptoms and past medical history. I completed an independent physical exam.  I have discussed patient's plan of care with PA.   I agree with note as stated above, having amended the EMR as needed to reflect my findings.   This includes HISTORY OF PRESENT ILLNESS, HIV, PAST MEDICAL/SURGICAL/FAMILY/SOCIAL HISTORY, ALLERGIES AND HOME MEDICATIONS, REVIEW OF SYSTEMS, PHYSICAL EXAM, and any PROGRESS NOTES during the time I functioned as the attending physician for this patient.  62F h/o ?pancreatic cancer not chemo, s/p whipple in 2011 s/p incisional hernia repair 1 mo ago, depression, anxiety, bell's palsy with R sided facial droop p/w several days of anorexia, insomnia, diffuse abdo pain, no BMs or flatus in >1 week. No nausea, no vomiting, no chest pain or sob, no dysuria or hematuria. Few days ago dose of xanax was halved. currently not on serotonergic agents.    On exam pt appears cachectic, MMM, +lingual fasciculations, 2mm pupils bilaterally, no nystagmus, tachy, regular, ctab, abdomen soft with well-healing midline incision, +hyperreflexia, no clonus, mild tremors.  Plan - pain ctrl, ct a/p r/o sbo, IVF, labs, cpk/rectal temp r/o SS, benzos, reassess

## 2017-08-14 NOTE — ED PROVIDER NOTE - MEDICAL DECISION MAKING DETAILS
61 yo female with h/o pancreatic ca ,depression, anxiety pw anxiety, shaking,  diffuse abdominal pain.  Plan: labs, bobbi, tox, ct,  IVF, ativan, morphine, reassess.

## 2017-08-14 NOTE — H&P ADULT - ASSESSMENT
63 yo female with h/o pancreatic ca s/p whipple procedure in 2011 with complication of incisional hernia repaired May 2017, depression, anxiety, bells palsy present to the ED c/o abdominal pain, anorexia, decreased sleeping most likely due to anxiety and depression.

## 2017-08-15 DIAGNOSIS — F33.1 MAJOR DEPRESSIVE DISORDER, RECURRENT, MODERATE: ICD-10-CM

## 2017-08-15 LAB
BUN SERPL-MCNC: 12 MG/DL — SIGNIFICANT CHANGE UP (ref 7–23)
CALCIUM SERPL-MCNC: 8.8 MG/DL — SIGNIFICANT CHANGE UP (ref 8.4–10.5)
CHLORIDE SERPL-SCNC: 99 MMOL/L — SIGNIFICANT CHANGE UP (ref 98–107)
CO2 SERPL-SCNC: 22 MMOL/L — SIGNIFICANT CHANGE UP (ref 22–31)
CREAT SERPL-MCNC: 0.57 MG/DL — SIGNIFICANT CHANGE UP (ref 0.5–1.3)
GLUCOSE SERPL-MCNC: 69 MG/DL — LOW (ref 70–99)
HCT VFR BLD CALC: 33.7 % — LOW (ref 34.5–45)
HGB BLD-MCNC: 10.6 G/DL — LOW (ref 11.5–15.5)
MAGNESIUM SERPL-MCNC: 2 MG/DL — SIGNIFICANT CHANGE UP (ref 1.6–2.6)
MCHC RBC-ENTMCNC: 27.5 PG — SIGNIFICANT CHANGE UP (ref 27–34)
MCHC RBC-ENTMCNC: 31.5 % — LOW (ref 32–36)
MCV RBC AUTO: 87.5 FL — SIGNIFICANT CHANGE UP (ref 80–100)
NRBC # FLD: 0 — SIGNIFICANT CHANGE UP
PHOSPHATE SERPL-MCNC: 3 MG/DL — SIGNIFICANT CHANGE UP (ref 2.5–4.5)
PLATELET # BLD AUTO: 291 K/UL — SIGNIFICANT CHANGE UP (ref 150–400)
PMV BLD: 9.8 FL — SIGNIFICANT CHANGE UP (ref 7–13)
POTASSIUM SERPL-MCNC: 3.6 MMOL/L — SIGNIFICANT CHANGE UP (ref 3.5–5.3)
POTASSIUM SERPL-SCNC: 3.6 MMOL/L — SIGNIFICANT CHANGE UP (ref 3.5–5.3)
RBC # BLD: 3.85 M/UL — SIGNIFICANT CHANGE UP (ref 3.8–5.2)
RBC # FLD: 13.6 % — SIGNIFICANT CHANGE UP (ref 10.3–14.5)
SODIUM SERPL-SCNC: 139 MMOL/L — SIGNIFICANT CHANGE UP (ref 135–145)
T4 AB SER-ACNC: 8.24 UG/DL — SIGNIFICANT CHANGE UP (ref 5.1–13)
TSH SERPL-MCNC: 0.4 UIU/ML — SIGNIFICANT CHANGE UP (ref 0.27–4.2)
WBC # BLD: 4.87 K/UL — SIGNIFICANT CHANGE UP (ref 3.8–10.5)
WBC # FLD AUTO: 4.87 K/UL — SIGNIFICANT CHANGE UP (ref 3.8–10.5)

## 2017-08-15 PROCEDURE — 90792 PSYCH DIAG EVAL W/MED SRVCS: CPT

## 2017-08-15 PROCEDURE — 99233 SBSQ HOSP IP/OBS HIGH 50: CPT | Mod: GC

## 2017-08-15 RX ORDER — MIRTAZAPINE 45 MG/1
15 TABLET, ORALLY DISINTEGRATING ORAL AT BEDTIME
Qty: 0 | Refills: 0 | Status: DISCONTINUED | OUTPATIENT
Start: 2017-08-15 | End: 2017-08-17

## 2017-08-15 RX ORDER — ACETAMINOPHEN 500 MG
650 TABLET ORAL ONCE
Qty: 0 | Refills: 0 | Status: DISCONTINUED | OUTPATIENT
Start: 2017-08-15 | End: 2017-08-17

## 2017-08-15 RX ADMIN — Medication 500 MILLIGRAM(S): at 17:36

## 2017-08-15 RX ADMIN — Medication 0.25 MILLIGRAM(S): at 08:54

## 2017-08-15 RX ADMIN — Medication 1 APPLICATION(S): at 21:42

## 2017-08-15 RX ADMIN — Medication 500 MILLIGRAM(S): at 12:00

## 2017-08-15 RX ADMIN — Medication 500 MILLIGRAM(S): at 06:58

## 2017-08-15 RX ADMIN — Medication 500 MILLIGRAM(S): at 01:07

## 2017-08-15 RX ADMIN — MIRTAZAPINE 15 MILLIGRAM(S): 45 TABLET, ORALLY DISINTEGRATING ORAL at 21:42

## 2017-08-15 NOTE — PROGRESS NOTE ADULT - PROBLEM SELECTOR PLAN 2
-Has been on sertaline, but did not like what it did to her  -Has been on Lexapro in the past stopped taking it for unknown reasons  -Psychiatry will be consulted

## 2017-08-15 NOTE — DIETITIAN INITIAL EVALUATION ADULT. - NS AS NUTRI INTERV ED CONTENT
Recommended modifications/Priority modifications/Purpose of the nutrition education/Survival information/Nutrition relationship to health/disease

## 2017-08-15 NOTE — PROGRESS NOTE ADULT - PROBLEM SELECTOR PLAN 1
-Most likely due to Colitlis  -c/w Ciprofloxacin 500mg PO  -c/w Metronidazole 500mg PO -possible Colitlis, but unlikely since afebrile, no leukocytosis, and no diarrhea.  -DC ABx

## 2017-08-15 NOTE — PROGRESS NOTE ADULT - PROBLEM SELECTOR PLAN 3
-Has been on Xanax for several years 1mg, but was taking half a pill  -Recently Rx 0.5mg and was taking half a day  -Received Ativan 0.5mg in ED  -Xanax 0.25mg PRN if develops tremors  -Psychiatry will be consulted -Has been on Xanax for several years 1mg, but was taking half a pill  -Recently Rx 0.5mg and was taking half a day  -Received Ativan 0.5mg in ED  -Xanax 0.25mg PRN if develops tremors  -Psychiatry consulted

## 2017-08-15 NOTE — BEHAVIORAL HEALTH ASSESSMENT NOTE - HPI (INCLUDE ILLNESS QUALITY, SEVERITY, DURATION, TIMING, CONTEXT, MODIFYING FACTORS, ASSOCIATED SIGNS AND SYMPTOMS)
63y/o woman, , domiciled in [place] with sister (Nadiya, C [phone]) PMHx Right-sided Bell’s Palsy since age 10yo (), Pancreatic CA s/p Whipple procedure in  and reconstruction surgery with hernia repair in May 2017, PPHx anxiety and depression treated with Xanax and Lexapro, admitted on  night with abdominal pain, shakiness, anxiety, and poor oral intake, consulted for medication management and evaluation of anxiety and depression.    Upon evaluation, pt was lying in bed, AAOx3, lethargic, cooperative, endorsing sad mood, poor appetite (weight loss of about 50-70lbs since last year), hopelessness, poor concentration and energy, and insomnia that has been ongoing since mother’s death in April and has worsened for past 4 weeks. Pt reported no PPH, no SA. Pt reported no family hx of suicide or psychiatric illness. Pt denied cigarette smoking, alcohol use, and drug use. Currently denied SI/HI/AH/VH. Pt wants to stop being reliant on Xanax for her anxiety, pt currently feels helpless, but is open to help for treating her sadness, poor appetite, anxiety and shakiness. She is future-oriented, wants to go back to [employer], wants to have energy to go walking with sister, and wants to go back to working. Pt is willing to be admitted as inpatient for psychiatric medication management and stabilization. Pt feels safe in hospital and feels well-cared for by staff, hoping doctors can help her. Pt open to psychiatric follow-up on outpatient basis, if someone helps her find a psychiatrist and/or therapist.    As per sister, after surgery in , patient developed anxiety problems and her PCP Dr. Светлана Guerrero ([phone]) prescribed Xanax 1mg daily, which pt was taking for some time but wanted to discontinue so she began tapering it to 0.5mg herself up until . By 2017, mother passed away and her anxiety worsened and she reported depressive sx at that time, so she was sometimes taking Xanax 2mg daily. At , pt had taken Xanax 2mg, became combative with her other sister and fainted. According to sister, pt was taking Xanax 2mg daily since May. Sister took pt to psychologist for therapy, but pt refused to continue after initial consultation. Two weeks prior to admission, pt was taken to see [place] psychiatrist, Dr. Blanco, who placed her on Xanax 0.5mg daily and Lexapro unknown dosage, which pt took once, felt it did nothing and stopped taking Lexapro. Pt reported taking Zoloft (unspecified dosage), but felt she was allergic, and stopped. When asked what allergic reaction she had, pt was unsure.   very hard of hearing to the point where staff had to write down all questions, pt cooperative and responsive so long as communications were written clearly. Pt did not believe he needed to be stabilized psychiatrically, was unable to describe reason for admission, but was open to treatment for depression. Denied past and current SI/HI/AH/VH. Sister agreeable to medication adjustment to alleviate her depressive sx and anxiety.

## 2017-08-15 NOTE — BEHAVIORAL HEALTH ASSESSMENT NOTE - NSBHCHARTREVIEWLAB_PSY_A_CORE FT
10.6   4.87  )-----------( 291      ( 15 Aug 2017 06:30 )             33.7   08-15    139  |  99  |  12  ----------------------------<  69<L>  3.6   |  22  |  0.57    Ca    8.8      15 Aug 2017 06:30  Phos  3.0     08-15  Mg     2.0     08-15    TPro  7.2  /  Alb  3.7  /  TBili  0.4  /  DBili  x   /  AST  16  /  ALT  8   /  AlkPhos  62  08-14

## 2017-08-15 NOTE — BEHAVIORAL HEALTH ASSESSMENT NOTE - AFFECT RANGE
29M w/ b/l groin adenopathy, unintentional weight loss, leukocytosis  - will await hematology recs  - lymph node biopsy could be scheduled later this week if requested  - d/w attending Dr. Calvillo Constricted

## 2017-08-15 NOTE — PROGRESS NOTE ADULT - PROBLEM SELECTOR PLAN 2
-Has been on sertaline, but did not like what it did to her  -Has been on Lexapro in the past stopped taking it for unknown reasons  -Psychiatry will be consulted  -TSH 0.4 -Has been on sertaline, but did not like what it did to her  -Has been on Lexapro in the past stopped taking it for unknown reasons  -Psychiatry consulted  -TSH 0.4

## 2017-08-15 NOTE — DIETITIAN INITIAL EVALUATION ADULT. - NS AS NUTRI INTERV COLLABORAT
1)Continue regular diet + Ensure Enlive 8oz PO 2x daily                2)Add Multivitamin for micronutrient coverage                    3)Encourage adherence w medication regimen.                     4)Consider possibility of appetite stimulant, only as/if deemed medically appropriate                           5)Obtain daily weights

## 2017-08-15 NOTE — DIETITIAN INITIAL EVALUATION ADULT. - OTHER INFO
Pt. observed w 0% consumption of breakfast or Ensure supplement.  Encouraged PO intake & offered to provide food preferences.  Pt. denies nausea/vomiting/diarrhea, or issues with chewing/swallowing.   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  C/o constipation.  Attributes poor appetite to anxiety.  Per discussion w physician, Pt. nonadherent w medication regimen.  Psych. consult pending.

## 2017-08-15 NOTE — PROGRESS NOTE ADULT - SUBJECTIVE AND OBJECTIVE BOX
ZAINAB PRADHAN  62y  Female      Patient is a 62y old  Female who presents with a chief complaint of Abdominal Pain, anxiety, decreased appetite (14 Aug 2017 20:01)      INTERVAL HPI/OVERNIGHT EVENTS: As per NF, patient had difficulty time falling asleep, despite getting melatonin.  She felt anxious, but refused Xanax 0.25mg PRN.         REVIEW OF SYSTEMS:  CONSTITUTIONAL: No fever, weight loss, or fatigue  EYES: No eye pain, visual disturbances, or discharge  ENMT:  No difficulty hearing, tinnitus, vertigo; No sinus or throat pain  NECK: No pain or stiffness  BREASTS: No pain, masses, or nipple discharge  RESPIRATORY: No cough, wheezing, chills or hemoptysis; No shortness of breath  CARDIOVASCULAR: No chest pain, palpitations, dizziness, or leg swelling  GASTROINTESTINAL: No abdominal or epigastric pain. No nausea, vomiting, or hematemesis; No diarrhea or constipation. No melena or hematochezia.  GENITOURINARY: No dysuria, frequency, hematuria, or incontinence  NEUROLOGICAL: No headaches, memory loss, loss of strength, numbness, or tremors  SKIN: No itching, burning, rashes, or lesions   LYMPH NODES: No enlarged glands  ENDOCRINE: No heat or cold intolerance; No hair loss  MUSCULOSKELETAL: No joint pain or swelling; No muscle, back, or extremity pain  PSYCHIATRIC: No depression, anxiety, mood swings, or difficulty sleeping  HEME/LYMPH: No easy bruising, or bleeding gums  ALLERY AND IMMUNOLOGIC: No hives or eczema    T(C): 36.6 (08-15-17 @ 05:36), Max: 37.4 (17 @ 13:13)  HR: 84 (08-15-17 @ 05:36) (78 - 99)  BP: 106/65 (08-15-17 @ 05:36) (106/65 - 122/62)  RR: 18 (08-15-17 @ 05:36) (18 - 18)  SpO2: 99% (08-15-17 @ 05:36) (99% - 100%)  Wt(kg): --Vital Signs Last 24 Hrs  T(C): 36.6 (15 Aug 2017 05:36), Max: 37.4 (14 Aug 2017 13:13)  T(F): 97.9 (15 Aug 2017 05:36), Max: 99.3 (14 Aug 2017 13:13)  HR: 84 (15 Aug 2017 05:36) (78 - 99)  BP: 106/65 (15 Aug 2017 05:36) (106/65 - 122/62)  BP(mean): --  RR: 18 (15 Aug 2017 05:36) (18 - 18)  SpO2: 99% (15 Aug 2017 05:36) (99% - 100%)  Wt(kg): --    PHYSICAL EXAM:  GENERAL: NAD, well-groomed, well-developed  HEAD:  Atraumatic, Normocephalic  EYES: EOMI, PERRLA, conjunctiva and sclera clear  ENMT: No tonsillar erythema, exudates, or enlargement; Moist mucous membranes, Good dentition, No lesions  NECK: Supple, No JVD, Normal thyroid  CHEST/LUNG: Clear to percussion bilaterally; No rales, rhonchi, wheezing, or rubs  HEART: Regular rate and rhythm; No murmurs, rubs, or gallops  ABDOMEN: Soft, Nontender, Nondistended; Bowel sounds present  EXTREMITIES:  2+ Peripheral Pulses, No clubbing, cyanosis, or edema  LYMPH: No lymphadenopathy noted  SKIN: No rashes or lesions  NERVOUS SYSTEM:  Alert & Oriented X3    Consultant(s) Notes Reviewed:  [x ] YES  [ ] NO  Care Discussed with Consultants/Other Providers [ x] YES  [ ] NO    LABS:                        11.5   6.49  )-----------( 309      ( 14 Aug 2017 13:05 )             36.4     08-14    138  |  96<L>  |  21  ----------------------------<  81  4.0   |  27  |  0.71    Ca    9.6      14 Aug 2017 13:05    TPro  7.2  /  Alb  3.7  /  TBili  0.4  /  DBili  x   /  AST  16  /  ALT  8   /  AlkPhos  62  08-14      Urinalysis Basic - ( 14 Aug 2017 14:58 )    Color: PLYEL / Appearance: HAZY / S.012 / pH: 6.0  Gluc: NEGATIVE / Ketone: MODERATE  / Bili: NEGATIVE / Urobili: NORMAL E.U.   Blood: TRACE / Protein: NEGATIVE / Nitrite: NEGATIVE   Leuk Esterase: TRACE / RBC: 0-2 / WBC 2-5   Sq Epi: MOD / Non Sq Epi: x / Bacteria: FEW      CAPILLARY BLOOD GLUCOSE            Urinalysis Basic - ( 14 Aug 2017 14:58 )    Color: PLYEL / Appearance: HAZY / S.012 / pH: 6.0  Gluc: NEGATIVE / Ketone: MODERATE  / Bili: NEGATIVE / Urobili: NORMAL E.U.   Blood: TRACE / Protein: NEGATIVE / Nitrite: NEGATIVE   Leuk Esterase: TRACE / RBC: 0-2 / WBC 2-5   Sq Epi: MOD / Non Sq Epi: x / Bacteria: FEW        RADIOLOGY & ADDITIONAL TESTS:    Imaging Personally Reviewed:  [ ] YES  [ ] NO    HEALTH ISSUES - PROBLEM Dx:  Anorexia: Anorexia  Anxiety: Anxiety  Depression, unspecified depression type: Depression, unspecified depression type  Abdominal pain, unspecified location: Abdominal pain, unspecified location

## 2017-08-15 NOTE — DIETITIAN INITIAL EVALUATION ADULT. - NUTRITION INTERVENTION
Nutrition Education/Vitamin/Meals and Snack/Collaboration and Referral of Nutrition Care/Medical Food Supplements

## 2017-08-15 NOTE — PROGRESS NOTE ADULT - SUBJECTIVE AND OBJECTIVE BOX
ZAINAB PRADHAN  62y  Female      Patient is a 62y old  Female who presents with a chief complaint of Abdominal Pain, anxiety, decreased appetite (14 Aug 2017 20:01)      INTERVAL HPI/OVERNIGHT EVENTS:        REVIEW OF SYSTEMS:  CONSTITUTIONAL: No fever, weight loss, or fatigue  EYES: No eye pain, visual disturbances, or discharge  ENMT:  No difficulty hearing, tinnitus, vertigo; No sinus or throat pain  NECK: No pain or stiffness  BREASTS: No pain, masses, or nipple discharge  RESPIRATORY: No cough, wheezing, chills or hemoptysis; No shortness of breath  CARDIOVASCULAR: No chest pain, palpitations, dizziness, or leg swelling  GASTROINTESTINAL: No abdominal or epigastric pain. No nausea, vomiting, or hematemesis; No diarrhea or constipation. No melena or hematochezia.  GENITOURINARY: No dysuria, frequency, hematuria, or incontinence  NEUROLOGICAL: No headaches, memory loss, loss of strength, numbness, or tremors  SKIN: No itching, burning, rashes, or lesions   LYMPH NODES: No enlarged glands  ENDOCRINE: No heat or cold intolerance; No hair loss  MUSCULOSKELETAL: No joint pain or swelling; No muscle, back, or extremity pain  PSYCHIATRIC: No depression, anxiety, mood swings, or difficulty sleeping  HEME/LYMPH: No easy bruising, or bleeding gums  ALLERY AND IMMUNOLOGIC: No hives or eczema    T(C): 36.6 (08-15-17 @ 05:36), Max: 37.4 (17 @ 13:13)  HR: 84 (08-15-17 @ 05:36) (78 - 99)  BP: 106/65 (08-15-17 @ 05:36) (106/65 - 122/62)  RR: 18 (08-15-17 @ 05:36) (18 - 18)  SpO2: 99% (08-15-17 @ 05:36) (99% - 100%)  Wt(kg): --Vital Signs Last 24 Hrs  T(C): 36.6 (15 Aug 2017 05:36), Max: 37.4 (14 Aug 2017 13:13)  T(F): 97.9 (15 Aug 2017 05:36), Max: 99.3 (14 Aug 2017 13:13)  HR: 84 (15 Aug 2017 05:36) (78 - 99)  BP: 106/65 (15 Aug 2017 05:36) (106/65 - 122/62)  BP(mean): --  RR: 18 (15 Aug 2017 05:36) (18 - 18)  SpO2: 99% (15 Aug 2017 05:36) (99% - 100%)  Wt(kg): --    PHYSICAL EXAM:  GENERAL: NAD, well-groomed, well-developed  HEAD:  Atraumatic, Normocephalic  EYES: EOMI, PERRLA, conjunctiva and sclera clear  ENMT: No tonsillar erythema, exudates, or enlargement; Moist mucous membranes, Good dentition, No lesions  NECK: Supple, No JVD, Normal thyroid  CHEST/LUNG: Clear to percussion bilaterally; No rales, rhonchi, wheezing, or rubs  HEART: Regular rate and rhythm; No murmurs, rubs, or gallops  ABDOMEN: Soft, Nontender, Nondistended; Bowel sounds present  EXTREMITIES:  2+ Peripheral Pulses, No clubbing, cyanosis, or edema  LYMPH: No lymphadenopathy noted  SKIN: No rashes or lesions  NERVOUS SYSTEM:  Alert & Oriented X3    Consultant(s) Notes Reviewed:  [x ] YES  [ ] NO  Care Discussed with Consultants/Other Providers [ x] YES  [ ] NO    LABS:                        10.6   4.87  )-----------( 291      ( 15 Aug 2017 06:30 )             33.7     08-15    139  |  99  |  12  ----------------------------<  69<L>  3.6   |  22  |  0.57    Ca    8.8      15 Aug 2017 06:30  Phos  3.0     08-15  Mg     2.0     08-15    TPro  7.2  /  Alb  3.7  /  TBili  0.4  /  DBili  x   /  AST  16  /  ALT  8   /  AlkPhos  62  08-14      Urinalysis Basic - ( 14 Aug 2017 14:58 )    Color: PLYEL / Appearance: HAZY / S.012 / pH: 6.0  Gluc: NEGATIVE / Ketone: MODERATE  / Bili: NEGATIVE / Urobili: NORMAL E.U.   Blood: TRACE / Protein: NEGATIVE / Nitrite: NEGATIVE   Leuk Esterase: TRACE / RBC: 0-2 / WBC 2-5   Sq Epi: MOD / Non Sq Epi: x / Bacteria: FEW      CAPILLARY BLOOD GLUCOSE            Urinalysis Basic - ( 14 Aug 2017 14:58 )    Color: PLYEL / Appearance: HAZY / S.012 / pH: 6.0  Gluc: NEGATIVE / Ketone: MODERATE  / Bili: NEGATIVE / Urobili: NORMAL E.U.   Blood: TRACE / Protein: NEGATIVE / Nitrite: NEGATIVE   Leuk Esterase: TRACE / RBC: 0-2 / WBC 2-5   Sq Epi: MOD / Non Sq Epi: x / Bacteria: FEW        RADIOLOGY & ADDITIONAL TESTS:    Imaging Personally Reviewed:  [ ] YES  [ ] NO    HEALTH ISSUES - PROBLEM Dx:  Anorexia: Anorexia  Anxiety: Anxiety  Depression, unspecified depression type: Depression, unspecified depression type  Abdominal pain, unspecified location: Abdominal pain, unspecified location ZAINAB PRADHAN  62y  Female      Patient is a 62y old  Female who presents with a chief complaint of Abdominal Pain, anxiety, decreased appetite (14 Aug 2017 20:01)      INTERVAL HPI/OVERNIGHT EVENTS: No overnight events. Patient remains afebrile. She continues to have trouble sleeping and eating. She denies chest pain, SOB, chills, peripheral edema, diarrhea, or vomiting.        REVIEW OF SYSTEMS:  See HPI    T(C): 36.6 (08-15-17 @ 05:36), Max: 37.4 (17 @ 13:13)  HR: 84 (08-15-17 @ 05:36) (78 - 99)  BP: 106/65 (08-15-17 @ 05:36) (106/65 - 122/62)  RR: 18 (08-15-17 @ 05:36) (18 - 18)  SpO2: 99% (08-15-17 @ 05:36) (99% - 100%)  Wt(kg): --Vital Signs Last 24 Hrs  T(C): 36.6 (15 Aug 2017 05:36), Max: 37.4 (14 Aug 2017 13:13)  T(F): 97.9 (15 Aug 2017 05:36), Max: 99.3 (14 Aug 2017 13:13)  HR: 84 (15 Aug 2017 05:36) (78 - 99)  BP: 106/65 (15 Aug 2017 05:36) (106/65 - 122/62)  BP(mean): --  RR: 18 (15 Aug 2017 05:36) (18 - 18)  SpO2: 99% (15 Aug 2017 05:36) (99% - 100%)  Wt(kg): --    PHYSICAL EXAM:  GENERAL: NAD, well-groomed, well-developed  HEAD:  Atraumatic, Normocephalic  EYES: EOMI, PERRLA, conjunctiva and sclera clear  ENMT: No tonsillar erythema, exudates, or enlargement; Moist mucous membranes, Good dentition, No lesions  NECK: Supple, No JVD, Normal thyroid  CHEST/LUNG: Clear to percussion bilaterally; No rales, rhonchi, wheezing, or rubs  HEART: Regular rate and rhythm; No murmurs, rubs, or gallops  ABDOMEN: Soft, Nontender, Nondistended; Bowel sounds present  EXTREMITIES:  2+ Peripheral Pulses, No clubbing, cyanosis, or edema  LYMPH: No lymphadenopathy noted  SKIN: No rashes or lesions  NERVOUS SYSTEM:  Alert & Oriented X3    Consultant(s) Notes Reviewed:  [x ] YES  [ ] NO  Care Discussed with Consultants/Other Providers [ x] YES  [ ] NO    LABS:                        10.6   4.87  )-----------( 291      ( 15 Aug 2017 06:30 )             33.7     08-15    139  |  99  |  12  ----------------------------<  69<L>  3.6   |  22  |  0.57    Ca    8.8      15 Aug 2017 06:30  Phos  3.0     08-15  Mg     2.0     08-15    TPro  7.2  /  Alb  3.7  /  TBili  0.4  /  DBili  x   /  AST  16  /  ALT  8   /  AlkPhos  62  08-14      Urinalysis Basic - ( 14 Aug 2017 14:58 )    Color: PLYEL / Appearance: HAZY / S.012 / pH: 6.0  Gluc: NEGATIVE / Ketone: MODERATE  / Bili: NEGATIVE / Urobili: NORMAL E.U.   Blood: TRACE / Protein: NEGATIVE / Nitrite: NEGATIVE   Leuk Esterase: TRACE / RBC: 0-2 / WBC 2-5   Sq Epi: MOD / Non Sq Epi: x / Bacteria: FEW      CAPILLARY BLOOD GLUCOSE            Urinalysis Basic - ( 14 Aug 2017 14:58 )    Color: PLYEL / Appearance: HAZY / S.012 / pH: 6.0  Gluc: NEGATIVE / Ketone: MODERATE  / Bili: NEGATIVE / Urobili: NORMAL E.U.   Blood: TRACE / Protein: NEGATIVE / Nitrite: NEGATIVE   Leuk Esterase: TRACE / RBC: 0-2 / WBC 2-5   Sq Epi: MOD / Non Sq Epi: x / Bacteria: FEW        RADIOLOGY & ADDITIONAL TESTS:    Imaging Personally Reviewed:  [ ] YES  [ ] NO    HEALTH ISSUES - PROBLEM Dx:  Anorexia: Anorexia  Anxiety: Anxiety  Depression, unspecified depression type: Depression, unspecified depression type  Abdominal pain, unspecified location: Abdominal pain, unspecified location

## 2017-08-15 NOTE — BEHAVIORAL HEALTH ASSESSMENT NOTE - NSBHCHARTREVIEWVS_PSY_A_CORE FT
Vital Signs Last 24 Hrs  T(C): 36.4 (15 Aug 2017 14:33), Max: 36.6 (14 Aug 2017 20:56)  T(F): 97.6 (15 Aug 2017 14:33), Max: 97.9 (14 Aug 2017 20:56)  HR: 91 (15 Aug 2017 14:33) (78 - 91)  BP: 134/76 (15 Aug 2017 14:33) (106/65 - 134/76)  BP(mean): --  RR: 18 (15 Aug 2017 14:33) (18 - 18)  SpO2: 100% (15 Aug 2017 14:33) (99% - 100%)

## 2017-08-16 ENCOUNTER — TRANSCRIPTION ENCOUNTER (OUTPATIENT)
Age: 62
End: 2017-08-16

## 2017-08-16 PROCEDURE — 99233 SBSQ HOSP IP/OBS HIGH 50: CPT

## 2017-08-16 PROCEDURE — 99232 SBSQ HOSP IP/OBS MODERATE 35: CPT | Mod: GC

## 2017-08-16 RX ORDER — CLONAZEPAM 1 MG
0.12 TABLET ORAL
Qty: 0 | Refills: 0 | Status: DISCONTINUED | OUTPATIENT
Start: 2017-08-16 | End: 2017-08-17

## 2017-08-16 RX ORDER — CLONAZEPAM 1 MG
0.5 TABLET ORAL AT BEDTIME
Qty: 0 | Refills: 0 | Status: DISCONTINUED | OUTPATIENT
Start: 2017-08-16 | End: 2017-08-17

## 2017-08-16 RX ORDER — ESCITALOPRAM OXALATE 10 MG/1
10 TABLET, FILM COATED ORAL DAILY
Qty: 0 | Refills: 0 | Status: DISCONTINUED | OUTPATIENT
Start: 2017-08-16 | End: 2017-08-17

## 2017-08-16 RX ADMIN — MIRTAZAPINE 15 MILLIGRAM(S): 45 TABLET, ORALLY DISINTEGRATING ORAL at 22:14

## 2017-08-16 RX ADMIN — ESCITALOPRAM OXALATE 10 MILLIGRAM(S): 10 TABLET, FILM COATED ORAL at 13:53

## 2017-08-16 RX ADMIN — Medication 1 APPLICATION(S): at 22:14

## 2017-08-16 RX ADMIN — Medication 0.5 MILLIGRAM(S): at 22:14

## 2017-08-16 RX ADMIN — Medication 0.5 MILLIGRAM(S): at 07:14

## 2017-08-16 NOTE — DISCHARGE NOTE ADULT - PROVIDER TOKENS
FREE:[LAST:[Cesar],FIRST:[Светлана],PHONE:[phone]],FAX:[(   )    -]] FREE:[LAST:[Cesar],FIRST:[Светлана],PHONE:[phone]],FAX:[(   )    -]],FREE:[LAST:[Unknown],PHONE:[phone]],FAX:[(   )    -],ADDRESS:[[place] [address]  Appointment 8/21/2017 11:00am]]

## 2017-08-16 NOTE — DISCHARGE NOTE ADULT - PLAN OF CARE
Resolved abdominal pain You presented with abdominal pain for which you were given antibiotics in the emergency department. Because your general laboratory results were normal and you did not develop a fever, we discontinued the antibiotics and you were monitored for worsening symptoms. Your abdominal pain should resolve completely on its own. You have been diagnosed with depression as an outpatient. We have started you on mirtazapine 15 at night to help with your mood disorder. This medication is non-addicting and can take a few weeks for you to fully feel relief. Please take it every night as specified. Continue to follow up with psychiatry as an outpatient to manage your medications. You were diagnosed with anxiety as an outpatient. You have been on xanax for this. Benzodiazepines like xanax are the most effective medications for anxiety. However, they can be dangerous to stop on your own. Please follow up with your psychiatrist to continue to taper your medication as indicated by your outpatient psychiatrist. You have had some recent weight loss from decreased appetite recently. The mirtazapine should help increase your appetite. You were also seen by a dietician who evaluated your nutrition. They recommended the followin)Continue a normal diet and add Ensure Enlive twice daily  2)Add Multivitamin daily  3)Take all of your medications as prescribed Resolved abdominal pain; please follow up with your Primary care physician You presented with abdominal pain for which you were given antibiotics in the emergency department. Because your general laboratory results were normal and you did not develop a fever, we discontinued the antibiotics and you were monitored for worsening symptoms. Your abdominal pain should resolve completely on its own. You do not need antibiotics at this time. You have been diagnosed with depression as an outpatient. We have started you on mirtazapine 15 at night to help with your mood disorder. This medication is non-addicting and can take a few weeks for you to fully feel relief. Please take it every night as specified. Continue to follow up with psychiatry as an outpatient to manage your medications. We also prescribed Celexa 20mg. Please take this medication everyday. It will take about 4 weeks for the medication to start working, so please take it every day. You were diagnosed with anxiety as an outpatient. You have been on xanax for this. Benzodiazepines like xanax are the most effective medications for anxiety. However, they can be dangerous to stop on your own. Please follow up with your psychiatrist to continue to taper your medication as indicated by your outpatient psychiatrist. You are going to take Klonopin 0.25mg in the morning everyday and 0.50mg at night. Eventually this medication will be stopped by your psychiatrist, so it is important you take it everyday until your doctor says it is okay to stop. Take all your medications and follow up with a psychiatrist You have been diagnosed with depression as an outpatient. We have started you on mirtazapine 15 at night to help with your mood disorder. This medication is non-addicting and can take a few weeks for you to fully feel relief. Please take it every night as specified. Continue to follow up with psychiatry as an outpatient to manage your medications. We also prescribed Lexapro 10mg. Please take this medication everyday. It will take about 4 weeks for the medication to start working, so please take it every day. Please follow up with your appointment at [facility] on 08/21/2017 at 11:00am. You were diagnosed with anxiety as an outpatient. You have been on xanax for this. Benzodiazepines like xanax are the most effective medications for anxiety. However, they can be dangerous to stop on your own. Please follow up with your psychiatrist to continue to taper your medication as indicated by your outpatient psychiatrist. You are going to take Klonopin 0.25mg at night. Eventually this medication will be stopped by your psychiatrist, so it is important you take it everyday until your doctor says it is okay to stop.

## 2017-08-16 NOTE — DISCHARGE NOTE ADULT - MEDICATION SUMMARY - MEDICATIONS TO STOP TAKING
I will STOP taking the medications listed below when I get home from the hospital:    ALPRAZolam 0.5 mg oral tablet  -- 0.5-1 tab(s) by mouth once a day (patient does not want to take anymore)

## 2017-08-16 NOTE — DISCHARGE NOTE ADULT - HOSPITAL COURSE
61 yo female with h/o pancreatic ca s/p whipple procedure in 2011 with complication of incisional hernia repaired May 2017, depression, anxiety, bells palsy present to the ED c/o abdominal pain, anorexia, decreased sleeping most likely due to anxiety and depression. Patient received cipro/flagyl in the ED for likely colitis, but was discontinued on day 2 as abdominal pain unlikely to be infectious as pt remained afebrile, without leukocytosis and diarrhea. Psychiatry was consulted for her anorexia, insomnia, anxiety and depression. Patient had been on long course of xanax, which was added PRN to prevent withdrawal. Psychiatry recommended starting mirtazapine 15 to address her mood disorder as well as her decreased appetite and insomnia. They also recommended continued benzodiazepine tapering to be done as an outpatient as the mirtazapine became therapeutic. On day 3, patient was medical cleared for discharge to be followed as an outpatient by psychiatry. 63 yo female with h/o pancreatic ca s/p whipple procedure in 2011 with complication of incisional hernia repaired May 2017, depression, anxiety, bells palsy present to the ED c/o abdominal pain, anorexia, decreased sleeping most likely due to anxiety and depression. Patient received cipro/flagyl in the ED for likely colitis, but was discontinued on day 2 as abdominal pain unlikely to be infectious as pt remained afebrile, without leukocytosis and diarrhea. Psychiatry was consulted for her anorexia, insomnia, anxiety and depression. Patient had been on long course of xanax, which was added PRN to prevent withdrawal. Psychiatry recommended starting mirtazapine 15 to address her mood disorder as well as her decreased appetite and insomnia. They also recommended continued benzodiazepine Klonopin 0.25mg QAM and 0.50mg QPM. tapering to be done as an outpatient as the mirtazapine became therapeutic. Patient to also start Celexa 20mg. On day 3, patient was medical cleared for discharge to be followed as an outpatient by psychiatry. 63 yo female with h/o pancreatic ca s/p whipple procedure in 2011 with complication of incisional hernia repaired May 2017, depression, anxiety, bells palsy present to the ED c/o abdominal pain, anorexia, decreased sleeping most likely due to anxiety and depression. CT of abdomen IMPRESSION: Probable colitis. Patient received cipro/flagyl in the ED for likely colitis, but was discontinued on day 2 as abdominal pain unlikely to be infectious as pt remained afebrile, without leukocytosis and diarrhea.  Psychiatry was consulted for her anorexia, insomnia, anxiety and depression. Patient had been on long course of xanax, which was added PRN to prevent withdrawal. Psychiatry recommended starting mirtazapine 15 to address her mood disorder as well as her decreased appetite and insomnia. They also recommended continued benzodiazepine Klonopin 0.25mg QPM. tapering to be done as an outpatient as the mirtazapine became therapeutic. Patient to also start Lexapro 10mg PO QD. On day 3, patient was medical cleared for discharge to be followed as an outpatient by psychiatry. 63 yo female with h/o pancreatic ca s/p whipple procedure in 2011 with complication of incisional hernia repaired May 2017, depression, anxiety, bells palsy present to the ED c/o abdominal pain, anorexia, decreased sleeping most likely due to anxiety and depression. CT of abdomen IMPRESSION: Probable colitis. Patient received cipro/flagyl in the ED for likely colitis, but was discontinued on day 2 as abdominal pain unlikely to be infectious as pt remained afebrile, without leukocytosis and diarrhea.  Psychiatry was consulted for her anorexia, insomnia, anxiety and depression. Patient had been on long course of xanax, which was added PRN to prevent withdrawal. Psychiatry recommended starting mirtazapine 15 to address her mood disorder as well as her decreased appetite and insomnia. They also recommended continued benzodiazepine Klonopin 0.25mg QPM. tapering to be done as an outpatient as the mirtazapine became therapeutic. Patient to also start Lexapro 10mg PO QD. On day 3, patient was medical cleared for discharge to be followed as an outpatient by psychiatry.     medicine attending addendum- this unfortunate woman presented with anxiety, agitation, fear of addiction to her xanax and poor appetite.  sx's were more chronic than acute. a CT in the ED indicated "colitis" so we admitted her to medicine to look for a medically treatable condition. it became clear this was all psychiatric in nature (no colitis, no weight loss, etc). she was seen by psuch and a treatment plan of SSRI's, Remeron and low dose benzo for sleep was initiated. close psych f/u upon DC was arranged.

## 2017-08-16 NOTE — DISCHARGE NOTE ADULT - PATIENT PORTAL LINK FT
“You can access the [facility] Patient Portal, offered by [facility], by registering with the following website: [email or web address]”

## 2017-08-16 NOTE — DISCHARGE NOTE ADULT - ADDITIONAL INSTRUCTIONS
Please follow up with your primary care provider for management of your medical complications.   Please follow up with psychiatry as an outpatient for management of your mirtazapine and for your xanax taper.

## 2017-08-16 NOTE — DISCHARGE NOTE ADULT - CARE PLAN
Principal Discharge DX:	Abdominal pain, unspecified location  Goal:	Resolved abdominal pain  Instructions for follow-up, activity and diet:	You presented with abdominal pain for which you were given antibiotics in the emergency department. Because your general laboratory results were normal and you did not develop a fever, we discontinued the antibiotics and you were monitored for worsening symptoms. Your abdominal pain should resolve completely on its own.  Secondary Diagnosis:	Depression, unspecified depression type  Instructions for follow-up, activity and diet:	You have been diagnosed with depression as an outpatient. We have started you on mirtazapine 15 at night to help with your mood disorder. This medication is non-addicting and can take a few weeks for you to fully feel relief. Please take it every night as specified. Continue to follow up with psychiatry as an outpatient to manage your medications.  Secondary Diagnosis:	Anxiety  Instructions for follow-up, activity and diet:	You were diagnosed with anxiety as an outpatient. You have been on xanax for this. Benzodiazepines like xanax are the most effective medications for anxiety. However, they can be dangerous to stop on your own. Please follow up with your psychiatrist to continue to taper your medication as indicated by your outpatient psychiatrist.  Secondary Diagnosis:	Anorexia Principal Discharge DX:	Abdominal pain, unspecified location  Goal:	Resolved abdominal pain  Instructions for follow-up, activity and diet:	You presented with abdominal pain for which you were given antibiotics in the emergency department. Because your general laboratory results were normal and you did not develop a fever, we discontinued the antibiotics and you were monitored for worsening symptoms. Your abdominal pain should resolve completely on its own.  Secondary Diagnosis:	Depression, unspecified depression type  Instructions for follow-up, activity and diet:	You have been diagnosed with depression as an outpatient. We have started you on mirtazapine 15 at night to help with your mood disorder. This medication is non-addicting and can take a few weeks for you to fully feel relief. Please take it every night as specified. Continue to follow up with psychiatry as an outpatient to manage your medications.  Secondary Diagnosis:	Anxiety  Instructions for follow-up, activity and diet:	You were diagnosed with anxiety as an outpatient. You have been on xanax for this. Benzodiazepines like xanax are the most effective medications for anxiety. However, they can be dangerous to stop on your own. Please follow up with your psychiatrist to continue to taper your medication as indicated by your outpatient psychiatrist.  Secondary Diagnosis:	Anorexia  Instructions for follow-up, activity and diet:	You have had some recent weight loss from decreased appetite recently. The mirtazapine should help increase your appetite. You were also seen by a dietician who evaluated your nutrition. They recommended the followin)Continue a normal diet and add Ensure Enlive twice daily  2)Add Multivitamin daily  3)Take all of your medications as prescribed Principal Discharge DX:	Abdominal pain, unspecified location  Goal:	Resolved abdominal pain; please follow up with your Primary care physician  Instructions for follow-up, activity and diet:	You presented with abdominal pain for which you were given antibiotics in the emergency department. Because your general laboratory results were normal and you did not develop a fever, we discontinued the antibiotics and you were monitored for worsening symptoms. Your abdominal pain should resolve completely on its own. You do not need antibiotics at this time.  Secondary Diagnosis:	Depression, unspecified depression type  Instructions for follow-up, activity and diet:	You have been diagnosed with depression as an outpatient. We have started you on mirtazapine 15 at night to help with your mood disorder. This medication is non-addicting and can take a few weeks for you to fully feel relief. Please take it every night as specified. Continue to follow up with psychiatry as an outpatient to manage your medications. We also prescribed Celexa 20mg. Please take this medication everyday. It will take about 4 weeks for the medication to start working, so please take it every day.  Secondary Diagnosis:	Anxiety  Instructions for follow-up, activity and diet:	You were diagnosed with anxiety as an outpatient. You have been on xanax for this. Benzodiazepines like xanax are the most effective medications for anxiety. However, they can be dangerous to stop on your own. Please follow up with your psychiatrist to continue to taper your medication as indicated by your outpatient psychiatrist. You are going to take Klonopin 0.25mg in the morning everyday and 0.50mg at night. Eventually this medication will be stopped by your psychiatrist, so it is important you take it everyday until your doctor says it is okay to stop.  Secondary Diagnosis:	Anorexia  Instructions for follow-up, activity and diet:	You have had some recent weight loss from decreased appetite recently. The mirtazapine should help increase your appetite. You were also seen by a dietician who evaluated your nutrition. They recommended the followin)Continue a normal diet and add Ensure Enlive twice daily  2)Add Multivitamin daily  3)Take all of your medications as prescribed Principal Discharge DX:	Abdominal pain, unspecified location  Goal:	Resolved abdominal pain; please follow up with your Primary care physician  Instructions for follow-up, activity and diet:	You presented with abdominal pain for which you were given antibiotics in the emergency department. Because your general laboratory results were normal and you did not develop a fever, we discontinued the antibiotics and you were monitored for worsening symptoms. Your abdominal pain should resolve completely on its own. You do not need antibiotics at this time.  Secondary Diagnosis:	Depression, unspecified depression type  Goal:	Take all your medications and follow up with a psychiatrist  Instructions for follow-up, activity and diet:	You have been diagnosed with depression as an outpatient. We have started you on mirtazapine 15 at night to help with your mood disorder. This medication is non-addicting and can take a few weeks for you to fully feel relief. Please take it every night as specified. Continue to follow up with psychiatry as an outpatient to manage your medications. We also prescribed Lexapro 10mg. Please take this medication everyday. It will take about 4 weeks for the medication to start working, so please take it every day. Please follow up with your appointment [facility] on 2017 at 11:00am.  Secondary Diagnosis:	Anxiety  Goal:	Take all your medications and follow up with a psychiatrist  Instructions for follow-up, activity and diet:	You were diagnosed with anxiety as an outpatient. You have been on xanax for this. Benzodiazepines like xanax are the most effective medications for anxiety. However, they can be dangerous to stop on your own. Please follow up with your psychiatrist to continue to taper your medication as indicated by your outpatient psychiatrist. You are going to take Klonopin 0.25mg at night. Eventually this medication will be stopped by your psychiatrist, so it is important you take it everyday until your doctor says it is okay to stop.  Secondary Diagnosis:	Anorexia  Goal:	Take all your medications and follow up with a psychiatrist  Instructions for follow-up, activity and diet:	You have had some recent weight loss from decreased appetite recently. The mirtazapine should help increase your appetite. You were also seen by a dietician who evaluated your nutrition. They recommended the followin)Continue a normal diet and add Ensure Enlive twice daily  2)Add Multivitamin daily  3)Take all of your medications as prescribed Principal Discharge DX:	Abdominal pain, unspecified location  Goal:	Resolved abdominal pain; please follow up with your Primary care physician  Instructions for follow-up, activity and diet:	You presented with abdominal pain for which you were given antibiotics in the emergency department. Because your general laboratory results were normal and you did not develop a fever, we discontinued the antibiotics and you were monitored for worsening symptoms. Your abdominal pain should resolve completely on its own. You do not need antibiotics at this time.  Secondary Diagnosis:	Depression, unspecified depression type  Goal:	Take all your medications and follow up with a psychiatrist  Instructions for follow-up, activity and diet:	You have been diagnosed with depression as an outpatient. We have started you on mirtazapine 15 at night to help with your mood disorder. This medication is non-addicting and can take a few weeks for you to fully feel relief. Please take it every night as specified. Continue to follow up with psychiatry as an outpatient to manage your medications. We also prescribed Lexapro 10mg. Please take this medication everyday. It will take about 4 weeks for the medication to start working, so please take it every day. Please [facility] on 2017 at 11:00am.  Secondary Diagnosis:	Anxiety  Goal:	Take all your medications and follow up with a psychiatrist  Instructions for follow-up, activity and diet:	You were diagnosed with anxiety as an outpatient. You have been on xanax for this. Benzodiazepines like xanax are the most effective medications for anxiety. However, they can be dangerous to stop on your own. Please follow up with your psychiatrist to continue to taper your medication as indicated by your outpatient psychiatrist. You are going to take Klonopin 0.25mg at night. Eventually this medication will be stopped by your psychiatrist, so it is important you take it everyday until your doctor says it is okay to stop.  Secondary Diagnosis:	Anorexia  Goal:	Take all your medications and follow up with a psychiatrist  Instructions for follow-up, activity and diet:	You have had some recent weight loss from decreased appetite recently. The mirtazapine should help increase your appetite. You were also seen by a dietician who evaluated your nutrition. They recommended the followin)Continue a normal diet and add Ensure Enlive twice daily  2)Add Multivitamin daily  3)Take all of your medications as prescribed Principal Discharge DX:	Abdominal pain, unspecified location  Goal:	Resolved abdominal pain; please follow up with your Primary care physician  Instructions for follow-up, activity and diet:	You presented with abdominal pain for which you were given antibiotics in the emergency department. Because your general laboratory results were normal and you did not develop a fever, we discontinued the antibiotics and you were monitored for worsening symptoms. Your abdominal pain should resolve completely on its own. You do not need antibiotics at this time.  Secondary Diagnosis:	Depression, unspecified depression type  Goal:	Take all your medications and follow up with a psychiatrist  Instructions for follow-up, activity and diet:	You have been diagnosed with depression as an outpatient. We have started you on mirtazapine 15 at night to help with your mood disorder. This medication is non-addicting and can take a few weeks for you to fully feel relief. Please take it every night as specified. Continue to follow up with psychiatry as an outpatient to manage your medications. We also prescribed Lexapro 10mg. Please take this medication everyday. It will take about 4 weeks for the medication to start working, so please take it every day. Please follow up with your appointment at [facility] on 2017 at 11:00am.  Secondary Diagnosis:	Anxiety  Goal:	Take all your medications and follow up with a psychiatrist  Instructions for follow-up, activity and diet:	You were diagnosed with anxiety as an outpatient. You have been on xanax for this. Benzodiazepines like xanax are the most effective medications for anxiety. However, they can be dangerous to stop on your own. Please follow up with your psychiatrist to continue to taper your medication as indicated by your outpatient psychiatrist. You are going to take Klonopin 0.25mg at night. Eventually this medication will be stopped by your psychiatrist, so it is important you take it everyday until your doctor says it is okay to stop.  Secondary Diagnosis:	Anorexia  Goal:	Take all your medications and follow up with a psychiatrist  Instructions for follow-up, activity and diet:	You have had some recent weight loss from decreased appetite recently. The mirtazapine should help increase your appetite. You were also seen by a dietician who evaluated your nutrition. They recommended the followin)Continue a normal diet and add Ensure Enlive twice daily  2)Add Multivitamin daily  3)Take all of your medications as prescribed

## 2017-08-16 NOTE — DISCHARGE NOTE ADULT - MEDICATION SUMMARY - MEDICATIONS TO TAKE
I will START or STAY ON the medications listed below when I get home from the hospital:    KlonoPIN 0.5 mg oral tablet  -- 1 tab(s) by mouth once a day (at bedtime) -for anxiety MDD:4mg/day  -- Caution federal law prohibits the transfer of this drug to any person other  than the person for whom it was prescribed.  Do not drink alcoholic beverages when taking this medication.  Do not take this drug if you are pregnant.  It is very important that you take or use this exactly as directed.  Do not skip doses or discontinue unless directed by your doctor.  May cause drowsiness.  Alcohol may intensify this effect.  Use care when operating dangerous machinery.  Obtain medical advice before taking any non-prescription drugs as some may affect the action of this medication.  This drug may impair the ability to drive or operate machinery.  Use care until you become familiar with its effects.    -- Indication: For Anxiety    clonazePAM 0.25 mg oral tablet, disintegrating  -- 1 tab(s) by mouth once a day (in the morning) -for anxiety MDD:4mg/day  -- Caution federal law prohibits the transfer of this drug to any person other  than the person for whom it was prescribed.  Do not drink alcoholic beverages when taking this medication.  Do not take this drug if you are pregnant.  It is very important that you take or use this exactly as directed.  Do not skip doses or discontinue unless directed by your doctor.  May cause drowsiness.  Alcohol may intensify this effect.  Use care when operating dangerous machinery.  Obtain medical advice before taking any non-prescription drugs as some may affect the action of this medication.  This drug may impair the ability to drive or operate machinery.  Use care until you become familiar with its effects.    -- Indication: For Anxiety    mirtazapine 15 mg oral tablet  -- 1 tab(s) by mouth once a day  -- It is very important that you take or use this exactly as directed.  Do not skip doses or discontinue unless directed by your doctor.  May cause drowsiness.  Alcohol may intensify this effect.  Use care when operating dangerous machinery.  Obtain medical advice before taking any non-prescription drugs as some may affect the action of this medication.    -- Indication: For Depression, unspecified depression type    CeleXA 20 mg oral tablet  -- 1 tab(s) by mouth once a day  -- It is very important that you take or use this exactly as directed.  Do not skip doses or discontinue unless directed by your doctor.  May cause drowsiness.  Alcohol may intensify this effect.  Use care when operating dangerous machinery.  Obtain medical advice before taking any non-prescription drugs as some may affect the action of this medication.    -- Indication: For Depression, unspecified depression type I will START or STAY ON the medications listed below when I get home from the hospital:    clonazePAM 0.25 mg oral tablet, disintegrating  -- 1 tab(s) by mouth once a day (at bedtime) -for anxiety MDD:4mg/day  -- Caution federal law prohibits the transfer of this drug to any person other  than the person for whom it was prescribed.  Do not drink alcoholic beverages when taking this medication.  Do not take this drug if you are pregnant.  It is very important that you take or use this exactly as directed.  Do not skip doses or discontinue unless directed by your doctor.  May cause drowsiness.  Alcohol may intensify this effect.  Use care when operating dangerous machinery.  Obtain medical advice before taking any non-prescription drugs as some may affect the action of this medication.  This drug may impair the ability to drive or operate machinery.  Use care until you become familiar with its effects.    -- Indication: For Anxiety    mirtazapine 15 mg oral tablet  -- 1 tab(s) by mouth once a day  -- It is very important that you take or use this exactly as directed.  Do not skip doses or discontinue unless directed by your doctor.  May cause drowsiness.  Alcohol may intensify this effect.  Use care when operating dangerous machinery.  Obtain medical advice before taking any non-prescription drugs as some may affect the action of this medication.    -- Indication: For Depression, unspecified depression type    escitalopram 10 mg oral tablet  -- 1 tab(s) by mouth once a day  -- Indication: For Depression, unspecified depression type

## 2017-08-16 NOTE — PROGRESS NOTE ADULT - PROBLEM SELECTOR PLAN 2
-Has been on sertaline, but did not like what it did to her  -Has been on Lexapro in the past stopped taking it for unknown reasons  -Psychiatry consulted  -TSH 0.4  -Started Mirtazapine 15mg

## 2017-08-16 NOTE — PROGRESS NOTE ADULT - SUBJECTIVE AND OBJECTIVE BOX
ZAINAB PRADHAN  62y  Female      Patient is a 62y old  Female who presents with a chief complaint of Abdominal Pain, anxiety, decreased appetite (14 Aug 2017 20:01)      INTERVAL HPI/OVERNIGHT EVENTS:        REVIEW OF SYSTEMS:  CONSTITUTIONAL: No fever, weight loss, or fatigue  EYES: No eye pain, visual disturbances, or discharge  ENMT:  No difficulty hearing, tinnitus, vertigo; No sinus or throat pain  NECK: No pain or stiffness  BREASTS: No pain, masses, or nipple discharge  RESPIRATORY: No cough, wheezing, chills or hemoptysis; No shortness of breath  CARDIOVASCULAR: No chest pain, palpitations, dizziness, or leg swelling  GASTROINTESTINAL: No abdominal or epigastric pain. No nausea, vomiting, or hematemesis; No diarrhea or constipation. No melena or hematochezia.  GENITOURINARY: No dysuria, frequency, hematuria, or incontinence  NEUROLOGICAL: No headaches, memory loss, loss of strength, numbness, or tremors  SKIN: No itching, burning, rashes, or lesions   LYMPH NODES: No enlarged glands  ENDOCRINE: No heat or cold intolerance; No hair loss  MUSCULOSKELETAL: No joint pain or swelling; No muscle, back, or extremity pain  PSYCHIATRIC: No depression, anxiety, mood swings, or difficulty sleeping  HEME/LYMPH: No easy bruising, or bleeding gums  ALLERY AND IMMUNOLOGIC: No hives or eczema    T(C): 36.5 (17 @ 05:27), Max: 36.5 (17 @ 05:27)  HR: 94 (17 @ 05:27) (87 - 94)  BP: 140/86 (17 @ 05:27) (134/76 - 140/86)  RR: 18 (17 @ 05:27) (18 - 18)  SpO2: 99% (17 @ 05:27) (99% - 100%)  Wt(kg): --Vital Signs Last 24 Hrs  T(C): 36.5 (16 Aug 2017 05:27), Max: 36.5 (16 Aug 2017 05:27)  T(F): 97.7 (16 Aug 2017 05:27), Max: 97.7 (16 Aug 2017 05:27)  HR: 94 (16 Aug 2017 05:27) (87 - 94)  BP: 140/86 (16 Aug 2017 05:27) (134/76 - 140/86)  BP(mean): --  RR: 18 (16 Aug 2017 05:27) (18 - 18)  SpO2: 99% (16 Aug 2017 05:27) (99% - 100%)  Wt(kg): --    PHYSICAL EXAM:  GENERAL: NAD, well-groomed, well-developed  HEAD:  Atraumatic, Normocephalic  EYES: EOMI, PERRLA, conjunctiva and sclera clear  ENMT: No tonsillar erythema, exudates, or enlargement; Moist mucous membranes, Good dentition, No lesions  NECK: Supple, No JVD, Normal thyroid  CHEST/LUNG: Clear to percussion bilaterally; No rales, rhonchi, wheezing, or rubs  HEART: Regular rate and rhythm; No murmurs, rubs, or gallops  ABDOMEN: Soft, Nontender, Nondistended; Bowel sounds present  EXTREMITIES:  2+ Peripheral Pulses, No clubbing, cyanosis, or edema  LYMPH: No lymphadenopathy noted  SKIN: No rashes or lesions  NERVOUS SYSTEM:  Alert & Oriented X3    Consultant(s) Notes Reviewed:  [x ] YES  [ ] NO  Care Discussed with Consultants/Other Providers [ x] YES  [ ] NO    LABS:                        10.6   4.87  )-----------( 291      ( 15 Aug 2017 06:30 )             33.7     08-15    139  |  99  |  12  ----------------------------<  69<L>  3.6   |  22  |  0.57    Ca    8.8      15 Aug 2017 06:30  Phos  3.0     08-15  Mg     2.0     08-15    TPro  7.2  /  Alb  3.7  /  TBili  0.4  /  DBili  x   /  AST  16  /  ALT  8   /  AlkPhos  62  08-14      Urinalysis Basic - ( 14 Aug 2017 14:58 )    Color: PLYEL / Appearance: HAZY / S.012 / pH: 6.0  Gluc: NEGATIVE / Ketone: MODERATE  / Bili: NEGATIVE / Urobili: NORMAL E.U.   Blood: TRACE / Protein: NEGATIVE / Nitrite: NEGATIVE   Leuk Esterase: TRACE / RBC: 0-2 / WBC 2-5   Sq Epi: MOD / Non Sq Epi: x / Bacteria: FEW      CAPILLARY BLOOD GLUCOSE            Urinalysis Basic - ( 14 Aug 2017 14:58 )    Color: PLYEL / Appearance: HAZY / S.012 / pH: 6.0  Gluc: NEGATIVE / Ketone: MODERATE  / Bili: NEGATIVE / Urobili: NORMAL E.U.   Blood: TRACE / Protein: NEGATIVE / Nitrite: NEGATIVE   Leuk Esterase: TRACE / RBC: 0-2 / WBC 2-5   Sq Epi: MOD / Non Sq Epi: x / Bacteria: FEW        RADIOLOGY & ADDITIONAL TESTS:    Imaging Personally Reviewed:  [ ] YES  [ ] NO    HEALTH ISSUES - PROBLEM Dx:  Moderate episode of recurrent major depressive disorder  Anorexia: Anorexia  Anxiety: Anxiety  Depression, unspecified depression type: Depression, unspecified depression type  Abdominal pain, unspecified location: Abdominal pain, unspecified location ZAINAB PRADHAN  62y  Female      Patient is a 62y old  Female who presents with a chief complaint of Abdominal Pain, anxiety, decreased appetite (14 Aug 2017 20:01)      INTERVAL HPI/OVERNIGHT EVENTS: No overnight events. Patient states she continues to feel anxiety. She denies chest pain, SOB, palpitations, chills, diarrhea, and abdominal pain.        REVIEW OF SYSTEMS:  See HPI    T(C): 36.5 (17 @ 05:27), Max: 36.5 (17 @ 05:27)  HR: 94 (17 @ 05:27) (87 - 94)  BP: 140/86 (17 @ 05:27) (134/76 - 140/86)  RR: 18 (17 @ 05:27) (18 - 18)  SpO2: 99% (17 @ 05:27) (99% - 100%)  Wt(kg): --Vital Signs Last 24 Hrs  T(C): 36.5 (16 Aug 2017 05:27), Max: 36.5 (16 Aug 2017 05:27)  T(F): 97.7 (16 Aug 2017 05:27), Max: 97.7 (16 Aug 2017 05:27)  HR: 94 (16 Aug 2017 05:27) (87 - 94)  BP: 140/86 (16 Aug 2017 05:27) (134/76 - 140/86)  BP(mean): --  RR: 18 (16 Aug 2017 05:27) (18 - 18)  SpO2: 99% (16 Aug 2017 05:27) (99% - 100%)  Wt(kg): --    PHYSICAL EXAM:  GENERAL: NAD, well-groomed, well-developed  NECK: Supple, No JVD, Normal thyroid  CHEST/LUNG: Clear to percussion bilaterally; No rales, rhonchi, wheezing, or rubs  HEART: Regular rate and rhythm; No murmurs, rubs, or gallops  ABDOMEN: Soft, Nontender, Nondistended; Bowel sounds present  SKIN: No rashes or lesions  NERVOUS SYSTEM:  Alert & Oriented X3    Consultant(s) Notes Reviewed:  [x ] YES  [ ] NO  Care Discussed with Consultants/Other Providers [ x] YES  [ ] NO    LABS:                        10.6   4.87  )-----------( 291      ( 15 Aug 2017 06:30 )             33.7     08-15    139  |  99  |  12  ----------------------------<  69<L>  3.6   |  22  |  0.57    Ca    8.8      15 Aug 2017 06:30  Phos  3.0     08-15  Mg     2.0     08-15    TPro  7.2  /  Alb  3.7  /  TBili  0.4  /  DBili  x   /  AST  16  /  ALT  8   /  AlkPhos  62  08-14      Urinalysis Basic - ( 14 Aug 2017 14:58 )    Color: PLYEL / Appearance: HAZY / S.012 / pH: 6.0  Gluc: NEGATIVE / Ketone: MODERATE  / Bili: NEGATIVE / Urobili: NORMAL E.U.   Blood: TRACE / Protein: NEGATIVE / Nitrite: NEGATIVE   Leuk Esterase: TRACE / RBC: 0-2 / WBC 2-5   Sq Epi: MOD / Non Sq Epi: x / Bacteria: FEW      CAPILLARY BLOOD GLUCOSE            Urinalysis Basic - ( 14 Aug 2017 14:58 )    Color: PLYEL / Appearance: HAZY / S.012 / pH: 6.0  Gluc: NEGATIVE / Ketone: MODERATE  / Bili: NEGATIVE / Urobili: NORMAL E.U.   Blood: TRACE / Protein: NEGATIVE / Nitrite: NEGATIVE   Leuk Esterase: TRACE / RBC: 0-2 / WBC 2-5   Sq Epi: MOD / Non Sq Epi: x / Bacteria: FEW        RADIOLOGY & ADDITIONAL TESTS:    Imaging Personally Reviewed:  [ ] YES  [ ] NO    HEALTH ISSUES - PROBLEM Dx:  Moderate episode of recurrent major depressive disorder  Anorexia: Anorexia  Anxiety: Anxiety  Depression, unspecified depression type: Depression, unspecified depression type  Abdominal pain, unspecified location: Abdominal pain, unspecified location

## 2017-08-16 NOTE — DISCHARGE NOTE ADULT - CARE PROVIDER_API CALL
Светлана Guerrero  Phone: [phone]  Fax: (   )    - Светлана Guerrero  Phone: [phone]  Fax: (   )    -    Unknown,   [facility]  [place] [facility]  [address], [place] [address]  Appointment 8/21/2017 11:00am  Phone: [phone]  Fax: (   )    -

## 2017-08-16 NOTE — PROGRESS NOTE ADULT - PROBLEM SELECTOR PLAN 3
-Has been on Xanax for several years 1mg, but was taking half a pill  -Recently Rx 0.5mg and was taking half a day  -Received Ativan 0.5mg in ED  -Ativan 0.5mg PRN for Anxiety

## 2017-08-16 NOTE — PROGRESS NOTE ADULT - PROBLEM SELECTOR PLAN 1
-possible Colitlis, but unlikely since afebrile, no leukocytosis, and no diarrhea.  -DC ABx -possible Colitlis, but unlikely since afebrile, no leukocytosis, and no diarrhea.  -DC ABx  -patient medically cleared

## 2017-08-17 VITALS
WEIGHT: 98.55 LBS | HEART RATE: 66 BPM | OXYGEN SATURATION: 100 % | RESPIRATION RATE: 18 BRPM | SYSTOLIC BLOOD PRESSURE: 125 MMHG | DIASTOLIC BLOOD PRESSURE: 90 MMHG | TEMPERATURE: 97 F

## 2017-08-17 PROCEDURE — 99233 SBSQ HOSP IP/OBS HIGH 50: CPT

## 2017-08-17 PROCEDURE — 99239 HOSP IP/OBS DSCHRG MGMT >30: CPT

## 2017-08-17 RX ORDER — CLONAZEPAM 1 MG
1 TABLET ORAL
Qty: 30 | Refills: 0
Start: 2017-08-17 | End: 2017-09-16

## 2017-08-17 RX ORDER — CLONAZEPAM 1 MG
1 TABLET ORAL
Qty: 30 | Refills: 0 | OUTPATIENT
Start: 2017-08-17 | End: 2017-09-16

## 2017-08-17 RX ORDER — CLONAZEPAM 1 MG
1 TABLET ORAL
Qty: 0 | Refills: 0 | COMMUNITY
Start: 2017-08-17

## 2017-08-17 RX ORDER — CLONAZEPAM 1 MG
0.25 TABLET ORAL AT BEDTIME
Qty: 0 | Refills: 0 | Status: DISCONTINUED | OUTPATIENT
Start: 2017-08-17 | End: 2017-08-17

## 2017-08-17 RX ORDER — CITALOPRAM 10 MG/1
1 TABLET, FILM COATED ORAL
Qty: 0 | Refills: 0 | COMMUNITY

## 2017-08-17 RX ORDER — ESCITALOPRAM OXALATE 10 MG/1
1 TABLET, FILM COATED ORAL
Qty: 30 | Refills: 0
Start: 2017-08-17 | End: 2017-09-16

## 2017-08-17 RX ORDER — MIRTAZAPINE 45 MG/1
1 TABLET, ORALLY DISINTEGRATING ORAL
Qty: 30 | Refills: 0
Start: 2017-08-17 | End: 2017-09-16

## 2017-08-17 RX ORDER — CITALOPRAM 10 MG/1
1 TABLET, FILM COATED ORAL
Qty: 30 | Refills: 0 | OUTPATIENT
Start: 2017-08-17 | End: 2017-09-16

## 2017-08-17 RX ORDER — MIRTAZAPINE 45 MG/1
1 TABLET, ORALLY DISINTEGRATING ORAL
Qty: 0 | Refills: 0 | COMMUNITY
Start: 2017-08-17

## 2017-08-17 RX ORDER — ALPRAZOLAM 0.25 MG
1 TABLET ORAL
Qty: 0 | Refills: 0 | COMMUNITY

## 2017-08-17 RX ADMIN — ESCITALOPRAM OXALATE 10 MILLIGRAM(S): 10 TABLET, FILM COATED ORAL at 08:51

## 2017-08-17 RX ADMIN — Medication 0.12 MILLIGRAM(S): at 08:52

## 2017-08-17 NOTE — PROGRESS NOTE BEHAVIORAL HEALTH - CASE SUMMARY
61 yo HF with anxiety and depression   Start Klonopin 0.25mg Qam and 0.5mg QHS  Lexapro 10mg PO QD
63 yo HF with Anxiety and Depression  Klonopin 0.25mg PO QHS  Remeron 15mg QHS  start Lexapro 10mg QD  pt educated and given outpatient appointment at [facility]

## 2017-08-17 NOTE — PROGRESS NOTE ADULT - PROBLEM SELECTOR PLAN 3
-Has been on Xanax for several years 1mg, but was taking half a pill  -Recently Rx 0.5mg and was taking half a day  -Received Ativan 0.5mg in ED  -Ativan 0.5mg PRN for Anxiety  -Klonipin 0.5mg

## 2017-08-17 NOTE — PROGRESS NOTE BEHAVIORAL HEALTH - SUMMARY
63 y/o F with PMHx Pancreatic cancer s/p Whipple's procedures, s/p reconstruction and hernia repair and PPHx of Anxiety and Depression, admitted for abdominal pain. Psychiatry consulted for poor oral intake and anxiety. Extensive evaluation was not suggestive of an underlying eating disorder. Pt does have a diagnosis of anxiety, but has not been consistently compliant with her medications.     1) Lexapro 10mg PO daily was re-initiated  2) Xanax was changed to PRN and Klonopin 0.125mg in AM and 0.5mg QHS was initiated  3) Remeron was initiated and titrated to 15mg PO QHS.   4) On day of discharge pt complained of excessive sedation in the morning and as a result, we discontinued morning dose of Klonopin and lowered her night dose as well.   5) Pt to follow up with [facility] [place] in 4 days.

## 2017-08-17 NOTE — PROGRESS NOTE ADULT - ASSESSMENT
61 yo female with h/o pancreatic ca s/p whipple procedure in 2011 with complication of incisional hernia repaired May 2017, depression, anxiety, bells palsy present to the ED c/o abdominal pain, anorexia, decreased sleeping most likely due to anxiety and depression.

## 2017-08-17 NOTE — PROGRESS NOTE BEHAVIORAL HEALTH - NSBHCONSULTMEDS_PSY_A_CORE FT
Klonopin 0.25mg PO QHS  Remeron 15mg QHS  start Lexapro 10mg QD
Klonopin 0.25mg Qam and 0.5mg QHS  Remeron 15mg QHS  start Lexapro 10mg QD

## 2017-08-17 NOTE — PROGRESS NOTE BEHAVIORAL HEALTH - NSBHFUPINTERVALHXFT_PSY_A_CORE
61 yo HF with depression and anxiety admitted with failure to thrive and poor PO intake, we had a detailed meeting with both sisters and pt reviewing her meds and presenting strategy for her getting better, with lots of education about illness and meds and compliance. Pt did sleep last night and has not been doing great, family has concerns about pt.s PO intake, pt is clear cognitively and not psychotic or suicidal.
No acute events overnight, no PRN medications given. Pt found sitting in bed, eating breakfast, noted to have good appetite. She reports sleeping well overnight but feeling too sedated in the morning. Writer discussed discontinuing morning dose of Klonopin and reducing the night dose of Klonopin as well and ot was agreeable to this. She is looking forward to going home with her sister and has future plans to follow up with [facility] psychiatry appointment on Monday morning. She reports being spiritual, being involved with the [employer], reports feeling supported by her two sisters and niece. She denies SI/HI.     Additional history obtained regarding pt's eating habits. She reports having "normal weight" during her teenage years, weighing 125lbs. She denies history of being weight conscious, denies history of binge eating or purging behavior. She denied counting calories or other restrictive behaviors. The heaviest she has been was 145lbs and reports being comfortable when she was heavy.

## 2017-08-17 NOTE — PROGRESS NOTE ADULT - SUBJECTIVE AND OBJECTIVE BOX
ZAINAB PRADHAN  62y  Female      Patient is a 62y old  Female who presents with a chief complaint of Abdominal Pain, anxiety, decreased appetite (16 Aug 2017 15:04)      INTERVAL HPI/OVERNIGHT EVENTS:        REVIEW OF SYSTEMS:  CONSTITUTIONAL: No fever, weight loss, or fatigue  EYES: No eye pain, visual disturbances, or discharge  ENMT:  No difficulty hearing, tinnitus, vertigo; No sinus or throat pain  NECK: No pain or stiffness  BREASTS: No pain, masses, or nipple discharge  RESPIRATORY: No cough, wheezing, chills or hemoptysis; No shortness of breath  CARDIOVASCULAR: No chest pain, palpitations, dizziness, or leg swelling  GASTROINTESTINAL: No abdominal or epigastric pain. No nausea, vomiting, or hematemesis; No diarrhea or constipation. No melena or hematochezia.  GENITOURINARY: No dysuria, frequency, hematuria, or incontinence  NEUROLOGICAL: No headaches, memory loss, loss of strength, numbness, or tremors  SKIN: No itching, burning, rashes, or lesions   LYMPH NODES: No enlarged glands  ENDOCRINE: No heat or cold intolerance; No hair loss  MUSCULOSKELETAL: No joint pain or swelling; No muscle, back, or extremity pain  PSYCHIATRIC: No depression, anxiety, mood swings, or difficulty sleeping  HEME/LYMPH: No easy bruising, or bleeding gums  ALLERY AND IMMUNOLOGIC: No hives or eczema    T(C): 36.2 (08-17-17 @ 05:26), Max: 36.7 (08-16-17 @ 21:10)  HR: 66 (08-17-17 @ 05:26) (66 - 100)  BP: 125/90 (08-17-17 @ 05:26) (125/90 - 135/86)  RR: 18 (08-17-17 @ 05:26) (18 - 18)  SpO2: 100% (08-17-17 @ 05:26) (100% - 100%)  Wt(kg): --Vital Signs Last 24 Hrs  T(C): 36.2 (17 Aug 2017 05:26), Max: 36.7 (16 Aug 2017 21:10)  T(F): 97.2 (17 Aug 2017 05:26), Max: 98 (16 Aug 2017 21:10)  HR: 66 (17 Aug 2017 05:26) (66 - 100)  BP: 125/90 (17 Aug 2017 05:26) (125/90 - 135/86)  BP(mean): --  RR: 18 (17 Aug 2017 05:26) (18 - 18)  SpO2: 100% (17 Aug 2017 05:26) (100% - 100%)  Wt(kg): --    PHYSICAL EXAM:  GENERAL: NAD, well-groomed, well-developed  HEAD:  Atraumatic, Normocephalic  EYES: EOMI, PERRLA, conjunctiva and sclera clear  ENMT: No tonsillar erythema, exudates, or enlargement; Moist mucous membranes, Good dentition, No lesions  NECK: Supple, No JVD, Normal thyroid  CHEST/LUNG: Clear to percussion bilaterally; No rales, rhonchi, wheezing, or rubs  HEART: Regular rate and rhythm; No murmurs, rubs, or gallops  ABDOMEN: Soft, Nontender, Nondistended; Bowel sounds present  EXTREMITIES:  2+ Peripheral Pulses, No clubbing, cyanosis, or edema  LYMPH: No lymphadenopathy noted  SKIN: No rashes or lesions  NERVOUS SYSTEM:  Alert & Oriented X3    Consultant(s) Notes Reviewed:  [x ] YES  [ ] NO  Care Discussed with Consultants/Other Providers [ x] YES  [ ] NO    LABS:              CAPILLARY BLOOD GLUCOSE                RADIOLOGY & ADDITIONAL TESTS:    Imaging Personally Reviewed:  [ ] YES  [ ] NO    HEALTH ISSUES - PROBLEM Dx:  Moderate episode of recurrent major depressive disorder  Anorexia: Anorexia  Anxiety: Anxiety  Depression, unspecified depression type: Depression, unspecified depression type  Abdominal pain, unspecified location: Abdominal pain, unspecified location          Hayes Jewell PGY1 [id number]  Night Float pager: [phone] ZAINAB PRADHAN  62y  Female      Patient is a 62y old  Female who presents with a chief complaint of Abdominal Pain, anxiety, decreased appetite (16 Aug 2017 15:04)      INTERVAL HPI/OVERNIGHT EVENTS: No overnight events. Afebrile. Tolerating PO. Patient denies chest pain, SOB, Abdominal pain, and cough. Since starting mirtazapine she states she is able to sleep and eat.      REVIEW OF SYSTEMS:  See HPI    T(C): 36.2 (08-17-17 @ 05:26), Max: 36.7 (08-16-17 @ 21:10)  HR: 66 (08-17-17 @ 05:26) (66 - 100)  BP: 125/90 (08-17-17 @ 05:26) (125/90 - 135/86)  RR: 18 (08-17-17 @ 05:26) (18 - 18)  SpO2: 100% (08-17-17 @ 05:26) (100% - 100%)  Wt(kg): --Vital Signs Last 24 Hrs  T(C): 36.2 (17 Aug 2017 05:26), Max: 36.7 (16 Aug 2017 21:10)  T(F): 97.2 (17 Aug 2017 05:26), Max: 98 (16 Aug 2017 21:10)  HR: 66 (17 Aug 2017 05:26) (66 - 100)  BP: 125/90 (17 Aug 2017 05:26) (125/90 - 135/86)  BP(mean): --  RR: 18 (17 Aug 2017 05:26) (18 - 18)  SpO2: 100% (17 Aug 2017 05:26) (100% - 100%)  Wt(kg): --    PHYSICAL EXAM:  GENERAL: NAD, well-groomed, well-developed  NECK: Supple, No JVD, Normal thyroid  CHEST/LUNG: Clear to percussion bilaterally; No rales, rhonchi, wheezing, or rubs  HEART: Regular rate and rhythm; No murmurs, rubs, or gallops  ABDOMEN: Soft, Nontender, Nondistended; Bowel sounds present  EXTREMITIES:  2+ Peripheral Pulses, No clubbing, cyanosis, or edema  SKIN: No rashes or lesions  NERVOUS SYSTEM:  Alert & Oriented X3    Consultant(s) Notes Reviewed:  [x ] YES  [ ] NO  Care Discussed with Consultants/Other Providers [ x] YES  [ ] NO    LABS: None done          RADIOLOGY & ADDITIONAL TESTS: None needed/ performed        HEALTH ISSUES - PROBLEM Dx:  Moderate episode of recurrent major depressive disorder  Anorexia: Anorexia  Anxiety: Anxiety  Depression, unspecified depression type: Depression, unspecified depression type  Abdominal pain, unspecified location: Abdominal pain, unspecified location          Hayes Jewell PGY1 [id number]  [facility] pager: [phone]

## 2017-08-17 NOTE — PROGRESS NOTE ADULT - PROBLEM SELECTOR PLAN 1
-possible Colitlis, but unlikely since afebrile, no leukocytosis, and no diarrhea.  -DC ABx  -patient medically cleared

## 2017-08-17 NOTE — PROGRESS NOTE BEHAVIORAL HEALTH - NSBHCHARTREVIEWVS_PSY_A_CORE FT
Vital Signs Last 24 Hrs  T(C): 36.2 (17 Aug 2017 05:26), Max: 36.7 (16 Aug 2017 21:10)  T(F): 97.2 (17 Aug 2017 05:26), Max: 98 (16 Aug 2017 21:10)  HR: 66 (17 Aug 2017 05:26) (66 - 100)  BP: 125/90 (17 Aug 2017 05:26) (125/90 - 135/86)  BP(mean): --  RR: 18 (17 Aug 2017 05:26) (18 - 18)  SpO2: 100% (17 Aug 2017 05:26) (100% - 100%)

## 2017-08-17 NOTE — PROGRESS NOTE ADULT - ATTENDING COMMENTS
see dc note for details
stable for dc home.  see dc note for details
the CT findings of colitis do not fit clinically.  will ask GI to render an opinion.  the symptoms she is experiencing seem to be psychiatric in nature.  will await psych input, consider transfer to [facility] if they agree.

## 2017-08-17 NOTE — PROGRESS NOTE BEHAVIORAL HEALTH - NSBHATTESTSEENBY_PSY_A_CORE
Attending Psychiatrist supervising NP/Trainee, meeting pt...
Attending Psychiatrist supervising NP/Trainee, meeting pt...

## 2017-08-17 NOTE — PROGRESS NOTE BEHAVIORAL HEALTH - NSBHCONSULTFOLLOWAFTERCARE_PSY_A_CORE FT
Follow up appointment given for [facility] [place] [facility] [address] [place] [id number] [phone]. appointment obtained on 8/21/17 at 11:00 am.

## 2017-08-17 NOTE — PROGRESS NOTE BEHAVIORAL HEALTH - RISK ASSESSMENT
Pt currently denies SI/HI, does not have prior history of self harming behavior, does not have history of aggression or violence, has supportive sisters with whom she resides. She is able to care for her ADLs, has health insurance and is able to access care, is agreeable to follow up with out pt psychiatry. She continues to have chronic unmodifiable risk factors including advanced age, chronic medical illness, loss of her mother earlier this year, for which out pt follow up was recommended. She does not meet criteria for in-patient psychiatric admission, has capacity to make medical decisions, including the decision to leave AMA.

## 2017-08-21 ENCOUNTER — APPOINTMENT (OUTPATIENT)
Dept: SURGICAL ONCOLOGY | Facility: CLINIC | Age: 62
End: 2017-08-21

## 2017-08-26 NOTE — CHART NOTE - NSCHARTNOTEFT_GEN_A_CORE
Patient's sister (and HCP), Nadiya, called and left a message after discharge about a question they had. Patient's sister was called back 3 times on 8/24 without answer. On 8/25, sister was reached. She stated that she believed her sister, Sade Hanson, was not doing well on the Mirtazapine she was sent home on. She stated her sister has been vomiting after taking the medication multiple times, and has had multiple seizures, which she attributes to the Mirtazapine. Nadiya states her sister has been taking all of her other medications as prescribed. She also endorses her sister having multiple episodes of black stool. She says the patient has been weak since discharge. Patient's sister was told that the symptoms were concerning, and she should bring patient to the nearest ER for evaluation. Sister asked if symptoms could be worked up as an outpatient, and was told that she could not be medically evaluated over the phone and reiterated that sister should bring patient to the ER. Discussed with senior, Cindy Adkins.    Mary Chirinos MD  PGY1

## 2017-09-11 ENCOUNTER — APPOINTMENT (OUTPATIENT)
Dept: SURGICAL ONCOLOGY | Facility: CLINIC | Age: 62
End: 2017-09-11

## 2018-03-05 ENCOUNTER — APPOINTMENT (OUTPATIENT)
Dept: SURGICAL ONCOLOGY | Facility: CLINIC | Age: 63
End: 2018-03-05
Payer: MEDICAID

## 2018-03-05 VITALS
OXYGEN SATURATION: 98 % | WEIGHT: 78 LBS | HEART RATE: 82 BPM | HEIGHT: 61 IN | SYSTOLIC BLOOD PRESSURE: 121 MMHG | DIASTOLIC BLOOD PRESSURE: 80 MMHG | BODY MASS INDEX: 14.73 KG/M2

## 2018-03-05 DIAGNOSIS — K43.2 INCISIONAL HERNIA W/OUT OBSTRUCTION OR GANGRENE: ICD-10-CM

## 2018-03-05 PROCEDURE — 99213 OFFICE O/P EST LOW 20 MIN: CPT

## 2018-03-05 RX ORDER — ERGOCALCIFEROL 1.25 MG/1
1.25 MG CAPSULE, LIQUID FILLED ORAL
Qty: 4 | Refills: 0 | Status: ACTIVE | COMMUNITY
Start: 2017-10-23

## 2018-03-05 RX ORDER — POTASSIUM CHLORIDE 1500 MG/1
20 TABLET, FILM COATED, EXTENDED RELEASE ORAL DAILY
Qty: 4 | Refills: 0 | Status: DISCONTINUED | COMMUNITY
Start: 2017-03-28 | End: 2018-03-05

## 2018-06-01 ENCOUNTER — OUTPATIENT (OUTPATIENT)
Dept: OUTPATIENT SERVICES | Facility: HOSPITAL | Age: 63
LOS: 1 days | End: 2018-06-01

## 2018-06-01 DIAGNOSIS — G51.0 BELL'S PALSY: Chronic | ICD-10-CM

## 2018-06-01 DIAGNOSIS — Z85.07 PERSONAL HISTORY OF MALIGNANT NEOPLASM OF PANCREAS: Chronic | ICD-10-CM

## 2018-06-04 ENCOUNTER — APPOINTMENT (OUTPATIENT)
Dept: NEUROLOGY | Facility: CLINIC | Age: 63
End: 2018-06-04
Payer: MEDICAID

## 2018-06-04 VITALS
WEIGHT: 72 LBS | TEMPERATURE: 98.1 F | HEART RATE: 84 BPM | HEIGHT: 61 IN | BODY MASS INDEX: 13.59 KG/M2 | SYSTOLIC BLOOD PRESSURE: 118 MMHG | DIASTOLIC BLOOD PRESSURE: 84 MMHG

## 2018-06-04 PROCEDURE — 99205 OFFICE O/P NEW HI 60 MIN: CPT

## 2018-06-05 LAB
T3 SERPL-MCNC: 80 NG/DL
T4 SERPL-MCNC: 8.6 UG/DL
TSH SERPL-ACNC: 0.46 UIU/ML

## 2018-06-18 DIAGNOSIS — R69 ILLNESS, UNSPECIFIED: ICD-10-CM

## 2018-06-21 ENCOUNTER — OTHER (OUTPATIENT)
Age: 63
End: 2018-06-21

## 2018-07-01 ENCOUNTER — OUTPATIENT (OUTPATIENT)
Dept: OUTPATIENT SERVICES | Facility: HOSPITAL | Age: 63
LOS: 1 days | End: 2018-07-01

## 2018-07-01 DIAGNOSIS — Z85.07 PERSONAL HISTORY OF MALIGNANT NEOPLASM OF PANCREAS: Chronic | ICD-10-CM

## 2018-07-01 DIAGNOSIS — G51.0 BELL'S PALSY: Chronic | ICD-10-CM

## 2018-07-18 ENCOUNTER — APPOINTMENT (OUTPATIENT)
Dept: NEUROLOGY | Facility: CLINIC | Age: 63
End: 2018-07-18
Payer: MEDICAID

## 2018-07-18 VITALS
SYSTOLIC BLOOD PRESSURE: 112 MMHG | HEART RATE: 88 BPM | WEIGHT: 74 LBS | BODY MASS INDEX: 13.97 KG/M2 | DIASTOLIC BLOOD PRESSURE: 66 MMHG | HEIGHT: 61 IN | OXYGEN SATURATION: 97 % | TEMPERATURE: 98.4 F

## 2018-07-18 DIAGNOSIS — R25.1 TREMOR, UNSPECIFIED: ICD-10-CM

## 2018-07-18 PROCEDURE — 99213 OFFICE O/P EST LOW 20 MIN: CPT

## 2018-07-23 DIAGNOSIS — Z71.89 OTHER SPECIFIED COUNSELING: ICD-10-CM

## 2018-07-23 PROBLEM — K43.2 INCISIONAL HERNIA WITHOUT OBSTRUCTION OR GANGRENE: Chronic | Status: ACTIVE | Noted: 2017-05-02

## 2018-08-22 ENCOUNTER — EMERGENCY (EMERGENCY)
Facility: HOSPITAL | Age: 63
LOS: 1 days | Discharge: HOME | End: 2018-08-22
Attending: EMERGENCY MEDICINE
Payer: MEDICAID

## 2018-08-22 VITALS
DIASTOLIC BLOOD PRESSURE: 80 MMHG | OXYGEN SATURATION: 100 % | HEART RATE: 72 BPM | RESPIRATION RATE: 16 BRPM | TEMPERATURE: 98 F | SYSTOLIC BLOOD PRESSURE: 119 MMHG

## 2018-08-22 VITALS
HEART RATE: 72 BPM | SYSTOLIC BLOOD PRESSURE: 130 MMHG | DIASTOLIC BLOOD PRESSURE: 85 MMHG | RESPIRATION RATE: 18 BRPM | TEMPERATURE: 98 F | OXYGEN SATURATION: 98 %

## 2018-08-22 DIAGNOSIS — Z85.07 PERSONAL HISTORY OF MALIGNANT NEOPLASM OF PANCREAS: Chronic | ICD-10-CM

## 2018-08-22 DIAGNOSIS — G51.0 BELL'S PALSY: Chronic | ICD-10-CM

## 2018-08-22 DIAGNOSIS — Z98.890 OTHER SPECIFIED POSTPROCEDURAL STATES: Chronic | ICD-10-CM

## 2018-08-22 LAB
ALBUMIN SERPL ELPH-MCNC: 3.5 G/DL — SIGNIFICANT CHANGE UP (ref 3.5–5)
ALP SERPL-CCNC: 64 U/L — SIGNIFICANT CHANGE UP (ref 40–120)
ALT FLD-CCNC: 15 U/L DA — SIGNIFICANT CHANGE UP (ref 10–60)
ANION GAP SERPL CALC-SCNC: 5 MMOL/L — SIGNIFICANT CHANGE UP (ref 5–17)
APPEARANCE UR: CLEAR — SIGNIFICANT CHANGE UP
AST SERPL-CCNC: 14 U/L — SIGNIFICANT CHANGE UP (ref 10–40)
BASOPHILS # BLD AUTO: 0.1 K/UL — SIGNIFICANT CHANGE UP (ref 0–0.2)
BASOPHILS NFR BLD AUTO: 1.4 % — SIGNIFICANT CHANGE UP (ref 0–2)
BILIRUB SERPL-MCNC: 0.3 MG/DL — SIGNIFICANT CHANGE UP (ref 0.2–1.2)
BILIRUB UR-MCNC: NEGATIVE — SIGNIFICANT CHANGE UP
BUN SERPL-MCNC: 15 MG/DL — SIGNIFICANT CHANGE UP (ref 7–18)
CALCIUM SERPL-MCNC: 9.2 MG/DL — SIGNIFICANT CHANGE UP (ref 8.4–10.5)
CHLORIDE SERPL-SCNC: 100 MMOL/L — SIGNIFICANT CHANGE UP (ref 96–108)
CO2 SERPL-SCNC: 33 MMOL/L — HIGH (ref 22–31)
COLOR SPEC: YELLOW — SIGNIFICANT CHANGE UP
CREAT SERPL-MCNC: 0.59 MG/DL — SIGNIFICANT CHANGE UP (ref 0.5–1.3)
DIFF PNL FLD: ABNORMAL
EOSINOPHIL # BLD AUTO: 0.1 K/UL — SIGNIFICANT CHANGE UP (ref 0–0.5)
EOSINOPHIL NFR BLD AUTO: 2.5 % — SIGNIFICANT CHANGE UP (ref 0–6)
GLUCOSE SERPL-MCNC: 98 MG/DL — SIGNIFICANT CHANGE UP (ref 70–99)
GLUCOSE UR QL: NEGATIVE — SIGNIFICANT CHANGE UP
HCT VFR BLD CALC: 35.8 % — SIGNIFICANT CHANGE UP (ref 34.5–45)
HGB BLD-MCNC: 11.5 G/DL — SIGNIFICANT CHANGE UP (ref 11.5–15.5)
KETONES UR-MCNC: NEGATIVE — SIGNIFICANT CHANGE UP
LEUKOCYTE ESTERASE UR-ACNC: NEGATIVE — SIGNIFICANT CHANGE UP
LIDOCAIN IGE QN: 123 U/L — SIGNIFICANT CHANGE UP (ref 73–393)
LYMPHOCYTES # BLD AUTO: 1.2 K/UL — SIGNIFICANT CHANGE UP (ref 1–3.3)
LYMPHOCYTES # BLD AUTO: 23.7 % — SIGNIFICANT CHANGE UP (ref 13–44)
MCHC RBC-ENTMCNC: 28.2 PG — SIGNIFICANT CHANGE UP (ref 27–34)
MCHC RBC-ENTMCNC: 32.1 GM/DL — SIGNIFICANT CHANGE UP (ref 32–36)
MCV RBC AUTO: 88 FL — SIGNIFICANT CHANGE UP (ref 80–100)
MONOCYTES # BLD AUTO: 0.4 K/UL — SIGNIFICANT CHANGE UP (ref 0–0.9)
MONOCYTES NFR BLD AUTO: 7.6 % — SIGNIFICANT CHANGE UP (ref 2–14)
NEUTROPHILS # BLD AUTO: 3.3 K/UL — SIGNIFICANT CHANGE UP (ref 1.8–7.4)
NEUTROPHILS NFR BLD AUTO: 64.8 % — SIGNIFICANT CHANGE UP (ref 43–77)
NITRITE UR-MCNC: NEGATIVE — SIGNIFICANT CHANGE UP
PH UR: 6.5 — SIGNIFICANT CHANGE UP (ref 5–8)
PLATELET # BLD AUTO: 219 K/UL — SIGNIFICANT CHANGE UP (ref 150–400)
POTASSIUM SERPL-MCNC: 4 MMOL/L — SIGNIFICANT CHANGE UP (ref 3.5–5.3)
POTASSIUM SERPL-SCNC: 4 MMOL/L — SIGNIFICANT CHANGE UP (ref 3.5–5.3)
PROT SERPL-MCNC: 7.8 G/DL — SIGNIFICANT CHANGE UP (ref 6–8.3)
PROT UR-MCNC: NEGATIVE — SIGNIFICANT CHANGE UP
RBC # BLD: 4.07 M/UL — SIGNIFICANT CHANGE UP (ref 3.8–5.2)
RBC # FLD: 11.9 % — SIGNIFICANT CHANGE UP (ref 10.3–14.5)
SODIUM SERPL-SCNC: 138 MMOL/L — SIGNIFICANT CHANGE UP (ref 135–145)
SP GR SPEC: 1 — LOW (ref 1.01–1.02)
UROBILINOGEN FLD QL: NEGATIVE — SIGNIFICANT CHANGE UP
WBC # BLD: 5 K/UL — SIGNIFICANT CHANGE UP (ref 3.8–10.5)
WBC # FLD AUTO: 5 K/UL — SIGNIFICANT CHANGE UP (ref 3.8–10.5)

## 2018-08-22 PROCEDURE — 85027 COMPLETE CBC AUTOMATED: CPT

## 2018-08-22 PROCEDURE — 74177 CT ABD & PELVIS W/CONTRAST: CPT | Mod: 26

## 2018-08-22 PROCEDURE — 83690 ASSAY OF LIPASE: CPT

## 2018-08-22 PROCEDURE — 80053 COMPREHEN METABOLIC PANEL: CPT

## 2018-08-22 PROCEDURE — 99284 EMERGENCY DEPT VISIT MOD MDM: CPT | Mod: 25

## 2018-08-22 PROCEDURE — 99284 EMERGENCY DEPT VISIT MOD MDM: CPT

## 2018-08-22 PROCEDURE — 74177 CT ABD & PELVIS W/CONTRAST: CPT

## 2018-08-22 PROCEDURE — 81001 URINALYSIS AUTO W/SCOPE: CPT

## 2018-08-22 PROCEDURE — 93005 ELECTROCARDIOGRAM TRACING: CPT

## 2018-08-22 RX ORDER — SODIUM CHLORIDE 9 MG/ML
1000 INJECTION INTRAMUSCULAR; INTRAVENOUS; SUBCUTANEOUS ONCE
Qty: 0 | Refills: 0 | Status: COMPLETED | OUTPATIENT
Start: 2018-08-22 | End: 2018-08-22

## 2018-08-22 RX ADMIN — SODIUM CHLORIDE 1000 MILLILITER(S): 9 INJECTION INTRAMUSCULAR; INTRAVENOUS; SUBCUTANEOUS at 20:14

## 2018-08-22 NOTE — ED PROVIDER NOTE - PSH
Bell's palsy  s/p surgery  H/O hernia repair    H/O pancreatic cancer  s/p Whipple procedure 2011  S/P Breast Biopsy, Right  benign-1987  s/p hemorrhoidectomy-1994    s/p lap cholecystectomy- 1990

## 2018-08-22 NOTE — ED PROVIDER NOTE - OBJECTIVE STATEMENT
64 y/o F pt with a PMHx of Bell's Palsy Cholelithiasis, Depression, FUO, Incisional hernia, and Pancreatic cancer and a PSHx of a Whipple procedure, Breast biopsy, Hemorrhoidectomy, and Lap cholecystectomy presents to ED c/o abd tightness x3 days. Pt also notes associated recent weight loss, abd pain, and SOB when her abd is tight. Pt endorses that she has been eating normally and has had normal BM. Pt denies nausea, vomiting, diarrhea, or any other complaints. Allergies: Sertraline (increased nervousness), Codeine (mouth blisters).

## 2018-08-22 NOTE — ED ADULT NURSE NOTE - NSIMPLEMENTINTERV_GEN_ALL_ED
Implemented All Fall Risk Interventions:  [place] to call system. Call bell, personal items and telephone within reach. Instruct patient to call for assistance. Room bathroom lighting operational. Non-slip footwear when patient is off stretcher. Physically safe environment: no spills, clutter or unnecessary equipment. Stretcher in lowest position, wheels locked, appropriate side rails in place. Provide visual cue, wrist band, yellow gown, etc. Monitor gait and stability. Monitor for mental status changes and reorient to person, place, and time. Review medications for side effects contributing to fall risk. Reinforce activity limits and safety measures with patient and family.

## 2018-08-22 NOTE — ED ADULT NURSE NOTE - OBJECTIVE STATEMENT
mild facial asymmetry present - pt is here c/o tightness of the abdomen denies n/v/d last bm this am

## 2018-09-01 ENCOUNTER — OUTPATIENT (OUTPATIENT)
Dept: OUTPATIENT SERVICES | Facility: HOSPITAL | Age: 63
LOS: 1 days | End: 2018-09-01
Payer: MEDICAID

## 2018-09-01 DIAGNOSIS — Z85.07 PERSONAL HISTORY OF MALIGNANT NEOPLASM OF PANCREAS: Chronic | ICD-10-CM

## 2018-09-01 DIAGNOSIS — G51.0 BELL'S PALSY: Chronic | ICD-10-CM

## 2018-09-01 DIAGNOSIS — Z98.890 OTHER SPECIFIED POSTPROCEDURAL STATES: Chronic | ICD-10-CM

## 2018-09-01 PROCEDURE — G9001: CPT

## 2018-09-12 DIAGNOSIS — Z71.89 OTHER SPECIFIED COUNSELING: ICD-10-CM

## 2018-09-30 ENCOUNTER — EMERGENCY (EMERGENCY)
Facility: HOSPITAL | Age: 63
LOS: 1 days | Discharge: HOME | End: 2018-09-30
Attending: EMERGENCY MEDICINE | Admitting: EMERGENCY MEDICINE
Payer: MEDICAID

## 2018-09-30 VITALS
OXYGEN SATURATION: 100 % | RESPIRATION RATE: 16 BRPM | SYSTOLIC BLOOD PRESSURE: 148 MMHG | HEART RATE: 98 BPM | DIASTOLIC BLOOD PRESSURE: 71 MMHG

## 2018-09-30 VITALS
OXYGEN SATURATION: 100 % | SYSTOLIC BLOOD PRESSURE: 142 MMHG | TEMPERATURE: 99 F | RESPIRATION RATE: 16 BRPM | HEART RATE: 102 BPM | DIASTOLIC BLOOD PRESSURE: 90 MMHG

## 2018-09-30 DIAGNOSIS — Z85.07 PERSONAL HISTORY OF MALIGNANT NEOPLASM OF PANCREAS: Chronic | ICD-10-CM

## 2018-09-30 DIAGNOSIS — Z98.890 OTHER SPECIFIED POSTPROCEDURAL STATES: Chronic | ICD-10-CM

## 2018-09-30 DIAGNOSIS — G51.0 BELL'S PALSY: Chronic | ICD-10-CM

## 2018-09-30 LAB
ALBUMIN SERPL ELPH-MCNC: 3.9 G/DL — SIGNIFICANT CHANGE UP (ref 3.3–5)
ALP SERPL-CCNC: 53 U/L — SIGNIFICANT CHANGE UP (ref 40–120)
ALT FLD-CCNC: 11 U/L — SIGNIFICANT CHANGE UP (ref 4–33)
APTT BLD: 28.3 SEC — SIGNIFICANT CHANGE UP (ref 27.5–37.4)
AST SERPL-CCNC: 24 U/L — SIGNIFICANT CHANGE UP (ref 4–32)
BASE EXCESS BLDV CALC-SCNC: 8.2 MMOL/L — SIGNIFICANT CHANGE UP
BASOPHILS # BLD AUTO: 0.04 K/UL — SIGNIFICANT CHANGE UP (ref 0–0.2)
BASOPHILS NFR BLD AUTO: 0.8 % — SIGNIFICANT CHANGE UP (ref 0–2)
BILIRUB SERPL-MCNC: 0.4 MG/DL — SIGNIFICANT CHANGE UP (ref 0.2–1.2)
BLOOD GAS VENOUS - CREATININE: 0.54 MG/DL — SIGNIFICANT CHANGE UP (ref 0.5–1.3)
BUN SERPL-MCNC: 15 MG/DL — SIGNIFICANT CHANGE UP (ref 7–23)
CALCIUM SERPL-MCNC: 9.4 MG/DL — SIGNIFICANT CHANGE UP (ref 8.4–10.5)
CHLORIDE BLDV-SCNC: 100 MMOL/L — SIGNIFICANT CHANGE UP (ref 96–108)
CHLORIDE SERPL-SCNC: 97 MMOL/L — LOW (ref 98–107)
CO2 SERPL-SCNC: 25 MMOL/L — SIGNIFICANT CHANGE UP (ref 22–31)
CREAT SERPL-MCNC: 0.63 MG/DL — SIGNIFICANT CHANGE UP (ref 0.5–1.3)
EOSINOPHIL # BLD AUTO: 0.02 K/UL — SIGNIFICANT CHANGE UP (ref 0–0.5)
EOSINOPHIL NFR BLD AUTO: 0.4 % — SIGNIFICANT CHANGE UP (ref 0–6)
GAS PNL BLDV: 134 MMOL/L — LOW (ref 136–146)
GLUCOSE BLDV-MCNC: 104 — HIGH (ref 70–99)
GLUCOSE SERPL-MCNC: 99 MG/DL — SIGNIFICANT CHANGE UP (ref 70–99)
HCO3 BLDV-SCNC: 29 MMOL/L — HIGH (ref 20–27)
HCT VFR BLD CALC: 39.7 % — SIGNIFICANT CHANGE UP (ref 34.5–45)
HCT VFR BLDV CALC: 42.3 % — SIGNIFICANT CHANGE UP (ref 34.5–45)
HGB BLD-MCNC: 12.8 G/DL — SIGNIFICANT CHANGE UP (ref 11.5–15.5)
HGB BLDV-MCNC: 13.8 G/DL — SIGNIFICANT CHANGE UP (ref 11.5–15.5)
IMM GRANULOCYTES # BLD AUTO: 0.01 # — SIGNIFICANT CHANGE UP
IMM GRANULOCYTES NFR BLD AUTO: 0.2 % — SIGNIFICANT CHANGE UP (ref 0–1.5)
INR BLD: 1.07 — SIGNIFICANT CHANGE UP (ref 0.88–1.17)
LACTATE BLDV-MCNC: 1.2 MMOL/L — SIGNIFICANT CHANGE UP (ref 0.5–2)
LIDOCAIN IGE QN: 24 U/L — SIGNIFICANT CHANGE UP (ref 7–60)
LYMPHOCYTES # BLD AUTO: 0.84 K/UL — LOW (ref 1–3.3)
LYMPHOCYTES # BLD AUTO: 16.9 % — SIGNIFICANT CHANGE UP (ref 13–44)
MCHC RBC-ENTMCNC: 28.3 PG — SIGNIFICANT CHANGE UP (ref 27–34)
MCHC RBC-ENTMCNC: 32.2 % — SIGNIFICANT CHANGE UP (ref 32–36)
MCV RBC AUTO: 87.6 FL — SIGNIFICANT CHANGE UP (ref 80–100)
MONOCYTES # BLD AUTO: 0.34 K/UL — SIGNIFICANT CHANGE UP (ref 0–0.9)
MONOCYTES NFR BLD AUTO: 6.9 % — SIGNIFICANT CHANGE UP (ref 2–14)
NEUTROPHILS # BLD AUTO: 3.71 K/UL — SIGNIFICANT CHANGE UP (ref 1.8–7.4)
NEUTROPHILS NFR BLD AUTO: 74.8 % — SIGNIFICANT CHANGE UP (ref 43–77)
NRBC # FLD: 0 — SIGNIFICANT CHANGE UP
PCO2 BLDV: 58 MMHG — HIGH (ref 41–51)
PH BLDV: 7.38 PH — SIGNIFICANT CHANGE UP (ref 7.32–7.43)
PLATELET # BLD AUTO: 247 K/UL — SIGNIFICANT CHANGE UP (ref 150–400)
PMV BLD: 10 FL — SIGNIFICANT CHANGE UP (ref 7–13)
PO2 BLDV: 27 MMHG — LOW (ref 35–40)
POTASSIUM BLDV-SCNC: 3.7 MMOL/L — SIGNIFICANT CHANGE UP (ref 3.4–4.5)
POTASSIUM SERPL-MCNC: 4.4 MMOL/L — SIGNIFICANT CHANGE UP (ref 3.5–5.3)
POTASSIUM SERPL-SCNC: 4.4 MMOL/L — SIGNIFICANT CHANGE UP (ref 3.5–5.3)
PROT SERPL-MCNC: 7.4 G/DL — SIGNIFICANT CHANGE UP (ref 6–8.3)
PROTHROM AB SERPL-ACNC: 11.9 SEC — SIGNIFICANT CHANGE UP (ref 9.8–13.1)
RBC # BLD: 4.53 M/UL — SIGNIFICANT CHANGE UP (ref 3.8–5.2)
RBC # FLD: 14.2 % — SIGNIFICANT CHANGE UP (ref 10.3–14.5)
SAO2 % BLDV: 42.6 % — LOW (ref 60–85)
SODIUM SERPL-SCNC: 135 MMOL/L — SIGNIFICANT CHANGE UP (ref 135–145)
WBC # BLD: 4.96 K/UL — SIGNIFICANT CHANGE UP (ref 3.8–10.5)
WBC # FLD AUTO: 4.96 K/UL — SIGNIFICANT CHANGE UP (ref 3.8–10.5)

## 2018-09-30 PROCEDURE — 99285 EMERGENCY DEPT VISIT HI MDM: CPT

## 2018-09-30 PROCEDURE — 74177 CT ABD & PELVIS W/CONTRAST: CPT | Mod: 26

## 2018-09-30 RX ORDER — SODIUM CHLORIDE 9 MG/ML
1000 INJECTION INTRAMUSCULAR; INTRAVENOUS; SUBCUTANEOUS ONCE
Qty: 0 | Refills: 0 | Status: COMPLETED | OUTPATIENT
Start: 2018-09-30 | End: 2018-09-30

## 2018-09-30 RX ORDER — MORPHINE SULFATE 50 MG/1
2 CAPSULE, EXTENDED RELEASE ORAL ONCE
Qty: 0 | Refills: 0 | Status: DISCONTINUED | OUTPATIENT
Start: 2018-09-30 | End: 2018-09-30

## 2018-09-30 RX ADMIN — SODIUM CHLORIDE 1000 MILLILITER(S): 9 INJECTION INTRAMUSCULAR; INTRAVENOUS; SUBCUTANEOUS at 13:31

## 2018-09-30 RX ADMIN — MORPHINE SULFATE 2 MILLIGRAM(S): 50 CAPSULE, EXTENDED RELEASE ORAL at 14:02

## 2018-09-30 RX ADMIN — SODIUM CHLORIDE 1000 MILLILITER(S): 9 INJECTION INTRAMUSCULAR; INTRAVENOUS; SUBCUTANEOUS at 14:47

## 2018-09-30 RX ADMIN — MORPHINE SULFATE 2 MILLIGRAM(S): 50 CAPSULE, EXTENDED RELEASE ORAL at 13:31

## 2018-09-30 RX ADMIN — MORPHINE SULFATE 2 MILLIGRAM(S): 50 CAPSULE, EXTENDED RELEASE ORAL at 13:57

## 2018-09-30 RX ADMIN — MORPHINE SULFATE 2 MILLIGRAM(S): 50 CAPSULE, EXTENDED RELEASE ORAL at 14:47

## 2018-09-30 NOTE — ED ADULT TRIAGE NOTE - CHIEF COMPLAINT QUOTE
pt c/o LUQ pain x few months, worsening today. pmhx wipple sx pt sts was sent to ED by md based on recent PET scan.  pt appears uncomfortable in triage

## 2018-09-30 NOTE — ED PROVIDER NOTE - ATTENDING CONTRIBUTION TO CARE
Dr. Sainz: I have personally performed a face to face bedside history and physical examination of this patient. I have discussed the history, examination, review of systems, assessment and plan of management with the resident. I have reviewed the electronic medical record and amended it to reflect my history, review of systems, physical exam, assessment and plan.    63F with pmh of pancreatic ca, s/p whipple here with abd pain, n/v.    on exam cachectic  abd soft, +epigastric tenderness  MMM    r/o sob, perforation, transaminitis. possibly gastritis. do not suspect ACS. plan  labs, CT /ap, antiemetics.

## 2018-09-30 NOTE — ED PROVIDER NOTE - MEDICAL DECISION MAKING DETAILS
62 y/o F with pancreatic CA s/p whipple presents with worsening upper abd pain. outpatient PET concerning for recurrence. labs, pain control, CT a/p

## 2018-09-30 NOTE — ED ADULT NURSE NOTE - OBJECTIVE STATEMENT
Pt received to room 13 complaining of LUQ pain. Pt states she has had the pain for a few months but today the pain is worse. Pt states she has a hx of whipple surgery, had a pet scan recently and was told to follow up regarding her results. Pt states she is scheduled to see her doctor tomorrow but the pain is worse so she came to the ED. Pt denies chest pain, SOB, N/V/D, fever or chills.

## 2018-09-30 NOTE — ED PROVIDER NOTE - PROGRESS NOTE DETAILS
Akanksha, PGY-4: Patient's pain increased. CT a/p with no pathology. Gen surg reviewed patient's imaging and labs. PAtient cleared for discharge. Has follow up with Dr. Mauro tomorrow.

## 2018-09-30 NOTE — ED PROVIDER NOTE - OBJECTIVE STATEMENT
62 y/o F with history of pancreatic CA s/p whipple presents with worsening abdominal pain. Pain is sharp, in epigastric and LUQ, no associated nausea/vomiting. Has not taken anything for pain today. Patient had outpatient PET scan performed on 9/20 which shows concern for malignant recurrence. Was advised to present to ED by Dr. Jay Seay and was advised to be seen by Dr. Damion Mauro. Has outpatient appointment with Dr. Mauro tomorrow. Denies fever, chills, chest pain, SOB, nausea/vomiting/diarrhea, dysuria, increased frequency.

## 2018-10-01 ENCOUNTER — APPOINTMENT (OUTPATIENT)
Dept: SURGICAL ONCOLOGY | Facility: CLINIC | Age: 63
End: 2018-10-01
Payer: MEDICAID

## 2018-10-01 VITALS
BODY MASS INDEX: 13.22 KG/M2 | HEIGHT: 61 IN | TEMPERATURE: 98.9 F | OXYGEN SATURATION: 97 % | DIASTOLIC BLOOD PRESSURE: 83 MMHG | HEART RATE: 82 BPM | WEIGHT: 70 LBS | SYSTOLIC BLOOD PRESSURE: 118 MMHG

## 2018-10-01 DIAGNOSIS — Z86.59 PERSONAL HISTORY OF OTHER MENTAL AND BEHAVIORAL DISORDERS: ICD-10-CM

## 2018-10-01 PROCEDURE — 99215 OFFICE O/P EST HI 40 MIN: CPT

## 2018-10-29 ENCOUNTER — APPOINTMENT (OUTPATIENT)
Dept: SURGICAL ONCOLOGY | Facility: CLINIC | Age: 63
End: 2018-10-29

## 2018-11-15 ENCOUNTER — EMERGENCY (EMERGENCY)
Facility: HOSPITAL | Age: 63
LOS: 1 days | Discharge: HOME | End: 2018-11-15
Attending: EMERGENCY MEDICINE | Admitting: EMERGENCY MEDICINE
Payer: MEDICAID

## 2018-11-15 VITALS
SYSTOLIC BLOOD PRESSURE: 147 MMHG | DIASTOLIC BLOOD PRESSURE: 86 MMHG | TEMPERATURE: 98 F | HEART RATE: 96 BPM | RESPIRATION RATE: 16 BRPM | OXYGEN SATURATION: 100 %

## 2018-11-15 VITALS
DIASTOLIC BLOOD PRESSURE: 87 MMHG | OXYGEN SATURATION: 100 % | SYSTOLIC BLOOD PRESSURE: 154 MMHG | RESPIRATION RATE: 16 BRPM | TEMPERATURE: 98 F | HEART RATE: 72 BPM

## 2018-11-15 DIAGNOSIS — Z85.07 PERSONAL HISTORY OF MALIGNANT NEOPLASM OF PANCREAS: Chronic | ICD-10-CM

## 2018-11-15 DIAGNOSIS — Z98.890 OTHER SPECIFIED POSTPROCEDURAL STATES: Chronic | ICD-10-CM

## 2018-11-15 DIAGNOSIS — G51.0 BELL'S PALSY: Chronic | ICD-10-CM

## 2018-11-15 LAB
ALBUMIN SERPL ELPH-MCNC: 4.2 G/DL — SIGNIFICANT CHANGE UP (ref 3.3–5)
ALP SERPL-CCNC: 59 U/L — SIGNIFICANT CHANGE UP (ref 40–120)
ALT FLD-CCNC: 9 U/L — SIGNIFICANT CHANGE UP (ref 4–33)
AST SERPL-CCNC: 17 U/L — SIGNIFICANT CHANGE UP (ref 4–32)
BASE EXCESS BLDV CALC-SCNC: 4 MMOL/L — SIGNIFICANT CHANGE UP
BASE EXCESS BLDV CALC-SCNC: SIGNIFICANT CHANGE UP MMOL/L
BASOPHILS # BLD AUTO: 0.02 K/UL — SIGNIFICANT CHANGE UP (ref 0–0.2)
BASOPHILS NFR BLD AUTO: 0.5 % — SIGNIFICANT CHANGE UP (ref 0–2)
BILIRUB SERPL-MCNC: 0.3 MG/DL — SIGNIFICANT CHANGE UP (ref 0.2–1.2)
BLOOD GAS VENOUS - CREATININE: 0.61 MG/DL — SIGNIFICANT CHANGE UP (ref 0.5–1.3)
BLOOD GAS VENOUS - CREATININE: SIGNIFICANT CHANGE UP MG/DL (ref 0.5–1.3)
BUN SERPL-MCNC: 14 MG/DL — SIGNIFICANT CHANGE UP (ref 7–23)
CALCIUM SERPL-MCNC: 9.6 MG/DL — SIGNIFICANT CHANGE UP (ref 8.4–10.5)
CHLORIDE BLDV-SCNC: 107 MMOL/L — SIGNIFICANT CHANGE UP (ref 96–108)
CHLORIDE BLDV-SCNC: SIGNIFICANT CHANGE UP MMOL/L (ref 96–108)
CHLORIDE SERPL-SCNC: 100 MMOL/L — SIGNIFICANT CHANGE UP (ref 98–107)
CO2 SERPL-SCNC: 26 MMOL/L — SIGNIFICANT CHANGE UP (ref 22–31)
CREAT SERPL-MCNC: 0.69 MG/DL — SIGNIFICANT CHANGE UP (ref 0.5–1.3)
EOSINOPHIL # BLD AUTO: 0.01 K/UL — SIGNIFICANT CHANGE UP (ref 0–0.5)
EOSINOPHIL NFR BLD AUTO: 0.2 % — SIGNIFICANT CHANGE UP (ref 0–6)
GAS PNL BLDV: 138 MMOL/L — SIGNIFICANT CHANGE UP (ref 136–146)
GAS PNL BLDV: SIGNIFICANT CHANGE UP MMOL/L (ref 136–146)
GLUCOSE BLDV-MCNC: 92 — SIGNIFICANT CHANGE UP (ref 70–99)
GLUCOSE BLDV-MCNC: SIGNIFICANT CHANGE UP (ref 70–99)
GLUCOSE SERPL-MCNC: 100 MG/DL — HIGH (ref 70–99)
HCO3 BLDV-SCNC: 26 MMOL/L — SIGNIFICANT CHANGE UP (ref 20–27)
HCO3 BLDV-SCNC: SIGNIFICANT CHANGE UP MMOL/L (ref 20–27)
HCT VFR BLD CALC: 40 % — SIGNIFICANT CHANGE UP (ref 34.5–45)
HCT VFR BLDV CALC: 38.7 % — SIGNIFICANT CHANGE UP (ref 34.5–45)
HCT VFR BLDV CALC: SIGNIFICANT CHANGE UP % (ref 34.5–45)
HGB BLD-MCNC: 12.7 G/DL — SIGNIFICANT CHANGE UP (ref 11.5–15.5)
HGB BLDV-MCNC: 12.6 G/DL — SIGNIFICANT CHANGE UP (ref 11.5–15.5)
HGB BLDV-MCNC: SIGNIFICANT CHANGE UP G/DL (ref 11.5–15.5)
IMM GRANULOCYTES # BLD AUTO: 0.02 # — SIGNIFICANT CHANGE UP
IMM GRANULOCYTES NFR BLD AUTO: 0.5 % — SIGNIFICANT CHANGE UP (ref 0–1.5)
LACTATE BLDV-MCNC: 0.8 MMOL/L — SIGNIFICANT CHANGE UP (ref 0.5–2)
LACTATE BLDV-MCNC: SIGNIFICANT CHANGE UP MMOL/L (ref 0.5–2)
LIDOCAIN IGE QN: 20.3 U/L — SIGNIFICANT CHANGE UP (ref 7–60)
LYMPHOCYTES # BLD AUTO: 0.83 K/UL — LOW (ref 1–3.3)
LYMPHOCYTES # BLD AUTO: 20.4 % — SIGNIFICANT CHANGE UP (ref 13–44)
MCHC RBC-ENTMCNC: 28.7 PG — SIGNIFICANT CHANGE UP (ref 27–34)
MCHC RBC-ENTMCNC: 31.8 % — LOW (ref 32–36)
MCV RBC AUTO: 90.5 FL — SIGNIFICANT CHANGE UP (ref 80–100)
MONOCYTES # BLD AUTO: 0.2 K/UL — SIGNIFICANT CHANGE UP (ref 0–0.9)
MONOCYTES NFR BLD AUTO: 4.9 % — SIGNIFICANT CHANGE UP (ref 2–14)
NEUTROPHILS # BLD AUTO: 2.98 K/UL — SIGNIFICANT CHANGE UP (ref 1.8–7.4)
NEUTROPHILS NFR BLD AUTO: 73.5 % — SIGNIFICANT CHANGE UP (ref 43–77)
NRBC # FLD: 0 — SIGNIFICANT CHANGE UP
PCO2 BLDV: 53 MMHG — HIGH (ref 41–51)
PCO2 BLDV: SIGNIFICANT CHANGE UP MMHG (ref 41–51)
PH BLDV: 7.36 PH — SIGNIFICANT CHANGE UP (ref 7.32–7.43)
PH BLDV: SIGNIFICANT CHANGE UP PH (ref 7.32–7.43)
PLATELET # BLD AUTO: 207 K/UL — SIGNIFICANT CHANGE UP (ref 150–400)
PMV BLD: 10.2 FL — SIGNIFICANT CHANGE UP (ref 7–13)
PO2 BLDV: 35 MMHG — SIGNIFICANT CHANGE UP (ref 35–40)
PO2 BLDV: SIGNIFICANT CHANGE UP MMHG (ref 35–40)
POTASSIUM BLDV-SCNC: 3.5 MMOL/L — SIGNIFICANT CHANGE UP (ref 3.4–4.5)
POTASSIUM BLDV-SCNC: SIGNIFICANT CHANGE UP MMOL/L (ref 3.4–4.5)
POTASSIUM SERPL-MCNC: 3.7 MMOL/L — SIGNIFICANT CHANGE UP (ref 3.5–5.3)
POTASSIUM SERPL-SCNC: 3.7 MMOL/L — SIGNIFICANT CHANGE UP (ref 3.5–5.3)
PROT SERPL-MCNC: 7.6 G/DL — SIGNIFICANT CHANGE UP (ref 6–8.3)
RBC # BLD: 4.42 M/UL — SIGNIFICANT CHANGE UP (ref 3.8–5.2)
RBC # FLD: 13.2 % — SIGNIFICANT CHANGE UP (ref 10.3–14.5)
SAO2 % BLDV: 60.3 % — SIGNIFICANT CHANGE UP (ref 60–85)
SAO2 % BLDV: SIGNIFICANT CHANGE UP % (ref 60–85)
SODIUM SERPL-SCNC: 139 MMOL/L — SIGNIFICANT CHANGE UP (ref 135–145)
TROPONIN T, HIGH SENSITIVITY: < 6 NG/L — SIGNIFICANT CHANGE UP (ref ?–14)
WBC # BLD: 4.06 K/UL — SIGNIFICANT CHANGE UP (ref 3.8–10.5)
WBC # FLD AUTO: 4.06 K/UL — SIGNIFICANT CHANGE UP (ref 3.8–10.5)

## 2018-11-15 PROCEDURE — 99285 EMERGENCY DEPT VISIT HI MDM: CPT

## 2018-11-15 PROCEDURE — 74177 CT ABD & PELVIS W/CONTRAST: CPT | Mod: 26

## 2018-11-15 PROCEDURE — 71275 CT ANGIOGRAPHY CHEST: CPT | Mod: 26

## 2018-11-15 RX ORDER — HYDROMORPHONE HYDROCHLORIDE 2 MG/ML
1 INJECTION INTRAMUSCULAR; INTRAVENOUS; SUBCUTANEOUS ONCE
Qty: 0 | Refills: 0 | Status: DISCONTINUED | OUTPATIENT
Start: 2018-11-15 | End: 2018-11-15

## 2018-11-15 RX ORDER — MORPHINE SULFATE 50 MG/1
4 CAPSULE, EXTENDED RELEASE ORAL ONCE
Qty: 0 | Refills: 0 | Status: DISCONTINUED | OUTPATIENT
Start: 2018-11-15 | End: 2018-11-15

## 2018-11-15 RX ORDER — SODIUM CHLORIDE 9 MG/ML
1000 INJECTION INTRAMUSCULAR; INTRAVENOUS; SUBCUTANEOUS ONCE
Qty: 0 | Refills: 0 | Status: COMPLETED | OUTPATIENT
Start: 2018-11-15 | End: 2018-11-15

## 2018-11-15 RX ORDER — OXYCODONE HYDROCHLORIDE 5 MG/1
1 TABLET ORAL
Qty: 12 | Refills: 0
Start: 2018-11-15 | End: 2018-11-17

## 2018-11-15 RX ADMIN — HYDROMORPHONE HYDROCHLORIDE 1 MILLIGRAM(S): 2 INJECTION INTRAMUSCULAR; INTRAVENOUS; SUBCUTANEOUS at 18:20

## 2018-11-15 RX ADMIN — MORPHINE SULFATE 4 MILLIGRAM(S): 50 CAPSULE, EXTENDED RELEASE ORAL at 16:16

## 2018-11-15 RX ADMIN — SODIUM CHLORIDE 1000 MILLILITER(S): 9 INJECTION INTRAMUSCULAR; INTRAVENOUS; SUBCUTANEOUS at 22:33

## 2018-11-15 RX ADMIN — MORPHINE SULFATE 4 MILLIGRAM(S): 50 CAPSULE, EXTENDED RELEASE ORAL at 16:32

## 2018-11-15 RX ADMIN — SODIUM CHLORIDE 1000 MILLILITER(S): 9 INJECTION INTRAMUSCULAR; INTRAVENOUS; SUBCUTANEOUS at 16:16

## 2018-11-15 RX ADMIN — HYDROMORPHONE HYDROCHLORIDE 1 MILLIGRAM(S): 2 INJECTION INTRAMUSCULAR; INTRAVENOUS; SUBCUTANEOUS at 18:09

## 2018-11-15 NOTE — ED PROVIDER NOTE - ATTENDING CONTRIBUTION TO CARE
patti: hx whipple procedure for pancreatic ca 2011. notes 2 years of abd pain with pain progressively increasing past year. Has been to the ED with similar pain. a PET/Ct done approx 2 months ago suggests metastatic disease. pt felt liteheaded yesterday but is eating and denies vomiting or diarrhea. she called her surgeons office and staff recc ED visit.   exam: NAD. abd is nondistended and nontender.   otherwise unremarkable exam.  LAbs are pending; pt is receiving saline IV.   After labs return, contact Dr Mauro (office sent pt in)

## 2018-11-15 NOTE — ED ADULT NURSE REASSESSMENT NOTE - NS ED NURSE REASSESS COMMENT FT1
break coverage RN- spoke to Monica in pharmacy- states it is OK to give Dilauded with codine allergies since she already received morphine and tolerated it well- EDMD aware of allergie

## 2018-11-15 NOTE — ED PROVIDER NOTE - OBJECTIVE STATEMENT
62yo F with pmhx of anxiety/depression, right facial palsy, hx of pancreatic CA s/p whipple in 2011 (Surg Onc Dr. Mauro) presents to the ED c/o worsening abd pain, fatigue, chest pain/sob. Pt states she has had chronic abd pain for 2 years, recently had PET scan under care of Dr. Mauro; however, for the last few days she is endorsing fatigue, intermittent palpitations and 62yo F with pmhx of anxiety/depression, right facial palsy, hx of pancreatic CA s/p whipple in 2011 (Surg Onc Dr. Mauro) presents to the ED c/o worsening abd pain, fatigue, chest pain/sob. Pt states she has had chronic abd pain for 2 years, recently had PET scan under care of Dr. Mauro; however, for the last few days she is endorsing fatigue, intermittent palpitations and sob. No exertional or positional component. Denies any pleuritic pain, dizziness, syncope, interscapular pain, N/V/D, diaphoresis, cough, pain associated with food intake, fevers, chills or night sweats. Patient denies recent surgery, prolonged immobility or travel, hemoptysis, extremity swelling, hx of dvt/pe. No family hx of sudden cardiac death or first degree relative with dissection. Pt is a non-smoker.

## 2018-11-15 NOTE — ED ADULT NURSE NOTE - OBJECTIVE STATEMENT
Pt a&ox3 hx of pancreatic ca, c/o dizziness and Lt sided abdominal pain, pt denies sob breathing even and unlabored, denies cp/discomfort, abd soft, tender, non-distended, skin is cool dry and intact, ivl placed, labs sent, md at bedside, will continue to monitor.

## 2018-11-15 NOTE — ED ADULT NURSE REASSESSMENT NOTE - NS ED NURSE REASSESS COMMENT FT1
break coverage RN- pt awake, a/ox3, vitally stable, pending CT- pt noted to have bells palsy with paralysis to R. side of face - pt appears comfortable in NAD, will continue to monitor

## 2018-11-15 NOTE — ED PROVIDER NOTE - PROGRESS NOTE DETAILS
patti: CT done as per Dr Mauro and unchanged and no evidence of a PE. Pt will need to f/u with opt providers for the chronic pain as well as the abnormal PET CT from her opt provider.

## 2018-11-15 NOTE — ED ADULT NURSE NOTE - NSIMPLEMENTINTERV_GEN_ALL_ED
Implemented All Universal Safety Interventions:  [place] to call system. Call bell, personal items and telephone within reach. Instruct patient to call for assistance. Room bathroom lighting operational. Non-slip footwear when patient is off stretcher. Physically safe environment: no spills, clutter or unnecessary equipment. Stretcher in lowest position, wheels locked, appropriate side rails in place.

## 2018-11-15 NOTE — ED ADULT TRIAGE NOTE - CHIEF COMPLAINT QUOTE
c/o dizziness, and abdominal pain with constipation, last BM yesterday. Denies any N/V, no fever.  PMH: Pancreatic CA, never received treatment.

## 2018-11-15 NOTE — ED ADULT NURSE REASSESSMENT NOTE - NS ED NURSE REASSESS COMMENT FT1
Pt responded well to Dilaudid given IVP, states abd pain 4/10, but states that the pain is tolerable at this time, pt resting comfortably at this time, awaiting transport to CT, will continue to monitor.

## 2018-12-04 ENCOUNTER — FORM ENCOUNTER (OUTPATIENT)
Age: 63
End: 2018-12-04

## 2018-12-05 ENCOUNTER — APPOINTMENT (OUTPATIENT)
Dept: NUCLEAR MEDICINE | Facility: IMAGING CENTER | Age: 63
End: 2018-12-05
Payer: MEDICAID

## 2018-12-05 ENCOUNTER — OUTPATIENT (OUTPATIENT)
Dept: OUTPATIENT SERVICES | Facility: HOSPITAL | Age: 63
LOS: 1 days | End: 2018-12-05
Payer: MEDICAID

## 2018-12-05 DIAGNOSIS — D37.6 NEOPLASM OF UNCERTAIN BEHAVIOR OF LIVER, GALLBLADDER AND BILE DUCTS: ICD-10-CM

## 2018-12-05 DIAGNOSIS — G51.0 BELL'S PALSY: Chronic | ICD-10-CM

## 2018-12-05 DIAGNOSIS — Z85.07 PERSONAL HISTORY OF MALIGNANT NEOPLASM OF PANCREAS: Chronic | ICD-10-CM

## 2018-12-05 DIAGNOSIS — Z98.890 OTHER SPECIFIED POSTPROCEDURAL STATES: Chronic | ICD-10-CM

## 2018-12-05 PROCEDURE — A9587: CPT

## 2018-12-05 PROCEDURE — 78815 PET IMAGE W/CT SKULL-THIGH: CPT | Mod: 26,PS

## 2018-12-05 PROCEDURE — 78815 PET IMAGE W/CT SKULL-THIGH: CPT

## 2018-12-17 ENCOUNTER — APPOINTMENT (OUTPATIENT)
Dept: SURGICAL ONCOLOGY | Facility: CLINIC | Age: 63
End: 2018-12-17
Payer: MEDICAID

## 2018-12-17 VITALS
HEIGHT: 61 IN | HEART RATE: 77 BPM | SYSTOLIC BLOOD PRESSURE: 108 MMHG | RESPIRATION RATE: 15 BRPM | DIASTOLIC BLOOD PRESSURE: 72 MMHG | BODY MASS INDEX: 13.41 KG/M2 | WEIGHT: 71 LBS

## 2018-12-17 DIAGNOSIS — D37.6: ICD-10-CM

## 2018-12-17 PROCEDURE — 99214 OFFICE O/P EST MOD 30 MIN: CPT

## 2019-05-22 ENCOUNTER — APPOINTMENT (OUTPATIENT)
Dept: CT IMAGING | Facility: IMAGING CENTER | Age: 64
End: 2019-05-22
Payer: MEDICAID

## 2019-05-22 ENCOUNTER — OUTPATIENT (OUTPATIENT)
Dept: OUTPATIENT SERVICES | Facility: HOSPITAL | Age: 64
LOS: 1 days | End: 2019-05-22
Payer: MEDICAID

## 2019-05-22 DIAGNOSIS — Z98.890 OTHER SPECIFIED POSTPROCEDURAL STATES: Chronic | ICD-10-CM

## 2019-05-22 DIAGNOSIS — Z00.8 ENCOUNTER FOR OTHER GENERAL EXAMINATION: ICD-10-CM

## 2019-05-22 DIAGNOSIS — G51.0 BELL'S PALSY: Chronic | ICD-10-CM

## 2019-05-22 DIAGNOSIS — Z85.07 PERSONAL HISTORY OF MALIGNANT NEOPLASM OF PANCREAS: Chronic | ICD-10-CM

## 2019-05-22 PROCEDURE — 82565 ASSAY OF CREATININE: CPT

## 2019-05-22 PROCEDURE — 74177 CT ABD & PELVIS W/CONTRAST: CPT | Mod: 26

## 2019-05-22 PROCEDURE — 74177 CT ABD & PELVIS W/CONTRAST: CPT

## 2019-10-17 ENCOUNTER — EMERGENCY (EMERGENCY)
Facility: HOSPITAL | Age: 64
LOS: 1 days | Discharge: HOME | End: 2019-10-17
Attending: EMERGENCY MEDICINE
Payer: MEDICAID

## 2019-10-17 VITALS
HEART RATE: 90 BPM | RESPIRATION RATE: 17 BRPM | WEIGHT: 72.09 LBS | SYSTOLIC BLOOD PRESSURE: 133 MMHG | TEMPERATURE: 98 F | DIASTOLIC BLOOD PRESSURE: 92 MMHG | OXYGEN SATURATION: 100 % | HEIGHT: 61 IN

## 2019-10-17 VITALS
DIASTOLIC BLOOD PRESSURE: 71 MMHG | SYSTOLIC BLOOD PRESSURE: 112 MMHG | OXYGEN SATURATION: 98 % | RESPIRATION RATE: 18 BRPM | TEMPERATURE: 98 F | HEART RATE: 77 BPM

## 2019-10-17 DIAGNOSIS — G51.0 BELL'S PALSY: Chronic | ICD-10-CM

## 2019-10-17 DIAGNOSIS — Z98.890 OTHER SPECIFIED POSTPROCEDURAL STATES: Chronic | ICD-10-CM

## 2019-10-17 DIAGNOSIS — Z85.07 PERSONAL HISTORY OF MALIGNANT NEOPLASM OF PANCREAS: Chronic | ICD-10-CM

## 2019-10-17 LAB
ALBUMIN SERPL ELPH-MCNC: 3.6 G/DL — SIGNIFICANT CHANGE UP (ref 3.5–5)
ALP SERPL-CCNC: 66 U/L — SIGNIFICANT CHANGE UP (ref 40–120)
ALT FLD-CCNC: 16 U/L DA — SIGNIFICANT CHANGE UP (ref 10–60)
ANION GAP SERPL CALC-SCNC: 4 MMOL/L — LOW (ref 5–17)
APPEARANCE UR: CLEAR — SIGNIFICANT CHANGE UP
AST SERPL-CCNC: 30 U/L — SIGNIFICANT CHANGE UP (ref 10–40)
BACTERIA # UR AUTO: ABNORMAL /HPF
BASOPHILS # BLD AUTO: 0.02 K/UL — SIGNIFICANT CHANGE UP (ref 0–0.2)
BASOPHILS NFR BLD AUTO: 0.6 % — SIGNIFICANT CHANGE UP (ref 0–2)
BILIRUB SERPL-MCNC: 0.6 MG/DL — SIGNIFICANT CHANGE UP (ref 0.2–1.2)
BILIRUB UR-MCNC: NEGATIVE — SIGNIFICANT CHANGE UP
BUN SERPL-MCNC: 19 MG/DL — HIGH (ref 7–18)
CALCIUM SERPL-MCNC: 9.3 MG/DL — SIGNIFICANT CHANGE UP (ref 8.4–10.5)
CHLORIDE SERPL-SCNC: 105 MMOL/L — SIGNIFICANT CHANGE UP (ref 96–108)
CO2 SERPL-SCNC: 29 MMOL/L — SIGNIFICANT CHANGE UP (ref 22–31)
COLOR SPEC: YELLOW — SIGNIFICANT CHANGE UP
CREAT SERPL-MCNC: 0.8 MG/DL — SIGNIFICANT CHANGE UP (ref 0.5–1.3)
DIFF PNL FLD: ABNORMAL
EOSINOPHIL # BLD AUTO: 0.01 K/UL — SIGNIFICANT CHANGE UP (ref 0–0.5)
EOSINOPHIL NFR BLD AUTO: 0.3 % — SIGNIFICANT CHANGE UP (ref 0–6)
EPI CELLS # UR: ABNORMAL /HPF
GLUCOSE SERPL-MCNC: 107 MG/DL — HIGH (ref 70–99)
GLUCOSE UR QL: NEGATIVE — SIGNIFICANT CHANGE UP
HCT VFR BLD CALC: 42.8 % — SIGNIFICANT CHANGE UP (ref 34.5–45)
HGB BLD-MCNC: 13.7 G/DL — SIGNIFICANT CHANGE UP (ref 11.5–15.5)
IMM GRANULOCYTES NFR BLD AUTO: 0.6 % — SIGNIFICANT CHANGE UP (ref 0–1.5)
KETONES UR-MCNC: NEGATIVE — SIGNIFICANT CHANGE UP
LEUKOCYTE ESTERASE UR-ACNC: ABNORMAL
LIDOCAIN IGE QN: 94 U/L — SIGNIFICANT CHANGE UP (ref 73–393)
LYMPHOCYTES # BLD AUTO: 0.9 K/UL — LOW (ref 1–3.3)
LYMPHOCYTES # BLD AUTO: 25.3 % — SIGNIFICANT CHANGE UP (ref 13–44)
MCHC RBC-ENTMCNC: 29.1 PG — SIGNIFICANT CHANGE UP (ref 27–34)
MCHC RBC-ENTMCNC: 32 GM/DL — SIGNIFICANT CHANGE UP (ref 32–36)
MCV RBC AUTO: 91.1 FL — SIGNIFICANT CHANGE UP (ref 80–100)
MONOCYTES # BLD AUTO: 0.2 K/UL — SIGNIFICANT CHANGE UP (ref 0–0.9)
MONOCYTES NFR BLD AUTO: 5.6 % — SIGNIFICANT CHANGE UP (ref 2–14)
NEUTROPHILS # BLD AUTO: 2.41 K/UL — SIGNIFICANT CHANGE UP (ref 1.8–7.4)
NEUTROPHILS NFR BLD AUTO: 67.6 % — SIGNIFICANT CHANGE UP (ref 43–77)
NITRITE UR-MCNC: NEGATIVE — SIGNIFICANT CHANGE UP
NRBC # BLD: 0 /100 WBCS — SIGNIFICANT CHANGE UP (ref 0–0)
PH UR: 7 — SIGNIFICANT CHANGE UP (ref 5–8)
PLATELET # BLD AUTO: 202 K/UL — SIGNIFICANT CHANGE UP (ref 150–400)
POTASSIUM SERPL-MCNC: 4.2 MMOL/L — SIGNIFICANT CHANGE UP (ref 3.5–5.3)
POTASSIUM SERPL-SCNC: 4.2 MMOL/L — SIGNIFICANT CHANGE UP (ref 3.5–5.3)
PROT SERPL-MCNC: 7.6 G/DL — SIGNIFICANT CHANGE UP (ref 6–8.3)
PROT UR-MCNC: NEGATIVE — SIGNIFICANT CHANGE UP
RBC # BLD: 4.7 M/UL — SIGNIFICANT CHANGE UP (ref 3.8–5.2)
RBC # FLD: 12.5 % — SIGNIFICANT CHANGE UP (ref 10.3–14.5)
RBC CASTS # UR COMP ASSIST: ABNORMAL /HPF (ref 0–2)
SODIUM SERPL-SCNC: 138 MMOL/L — SIGNIFICANT CHANGE UP (ref 135–145)
SP GR SPEC: 1.01 — SIGNIFICANT CHANGE UP (ref 1.01–1.02)
UROBILINOGEN FLD QL: NEGATIVE — SIGNIFICANT CHANGE UP
WBC # BLD: 3.56 K/UL — LOW (ref 3.8–10.5)
WBC # FLD AUTO: 3.56 K/UL — LOW (ref 3.8–10.5)
WBC UR QL: ABNORMAL /HPF (ref 0–5)

## 2019-10-17 PROCEDURE — 96374 THER/PROPH/DIAG INJ IV PUSH: CPT

## 2019-10-17 PROCEDURE — 99284 EMERGENCY DEPT VISIT MOD MDM: CPT

## 2019-10-17 PROCEDURE — 83690 ASSAY OF LIPASE: CPT

## 2019-10-17 PROCEDURE — 80053 COMPREHEN METABOLIC PANEL: CPT

## 2019-10-17 PROCEDURE — 36415 COLL VENOUS BLD VENIPUNCTURE: CPT

## 2019-10-17 PROCEDURE — 85027 COMPLETE CBC AUTOMATED: CPT

## 2019-10-17 PROCEDURE — 99284 EMERGENCY DEPT VISIT MOD MDM: CPT | Mod: 25

## 2019-10-17 PROCEDURE — 81001 URINALYSIS AUTO W/SCOPE: CPT

## 2019-10-17 PROCEDURE — 84443 ASSAY THYROID STIM HORMONE: CPT

## 2019-10-17 PROCEDURE — 96376 TX/PRO/DX INJ SAME DRUG ADON: CPT

## 2019-10-17 RX ORDER — ACETAMINOPHEN 500 MG
2 TABLET ORAL
Qty: 20 | Refills: 0
Start: 2019-10-17

## 2019-10-17 RX ORDER — KETOROLAC TROMETHAMINE 30 MG/ML
30 SYRINGE (ML) INJECTION ONCE
Refills: 0 | Status: DISCONTINUED | OUTPATIENT
Start: 2019-10-17 | End: 2019-10-17

## 2019-10-17 RX ORDER — ERYTHROMYCIN BASE 5 MG/GRAM
1 OINTMENT (GRAM) OPHTHALMIC (EYE) ONCE
Refills: 0 | Status: COMPLETED | OUTPATIENT
Start: 2019-10-17 | End: 2019-10-17

## 2019-10-17 RX ADMIN — Medication 30 MILLIGRAM(S): at 16:14

## 2019-10-17 RX ADMIN — Medication 30 MILLIGRAM(S): at 14:35

## 2019-10-17 RX ADMIN — Medication 1 APPLICATION(S): at 15:03

## 2019-10-17 RX ADMIN — Medication 30 MILLIGRAM(S): at 15:15

## 2019-10-17 NOTE — ED PROVIDER NOTE - CLINICAL SUMMARY MEDICAL DECISION MAKING FREE TEXT BOX
65 y/o pt presenting to ED L leg pain and spasms chronic in nature suggestive of radiculopathy. Will do labs and UA. Discussed with pt and daughter the importance of her Neurology F/u for possible imaging and medication for Tx of Radiculopathy. 63 y/o pt presenting to ED L leg pain and spasms chronic in nature suggestive of radiculopathy. Will do labs and UA. Discussed with pt and daughter the importance of her Neurology F/u for possible imaging and medication for Tx of Radiculopathy. Also referred to pain management.

## 2019-10-17 NOTE — ED ADULT NURSE NOTE - NSIMPLEMENTINTERV_GEN_ALL_ED
Implemented All Universal Safety Interventions:  [place] to call system. Call bell, personal items and telephone within reach. Instruct patient to call for assistance. Room bathroom lighting operational. Non-slip footwear when patient is off stretcher. Physically safe environment: no spills, clutter or unnecessary equipment. Stretcher in lowest position, wheels locked, appropriate side rails in place.

## 2019-10-17 NOTE — ED PROVIDER NOTE - PATIENT PORTAL LINK FT
You can access the FollowMyHealth Patient Portal offered by [facility] by registering at the following website: [email or web address]. By joining [facility]’s FollowMyHealth portal, you will also be able to view your health information using other applications (apps) compatible with our system.

## 2019-10-17 NOTE — ED PROVIDER NOTE - OBJECTIVE STATEMENT
65 y/o with PMHx/PSHx of Cholelithiasis, Depression, Pancreatic Ca s/p Whipple in 2011, L inguinal Hernia Repair in 2017, Lap Cholecystectomy presenting to ED with 3 months of back pain that radiates down her L leg causing cramps and spams to her abdomen as well. Pt states sxs are daily and worse in the AM and with movement. The pt attributes her sxs to being off of Benzos, which she was on chronically for the past 2 years. The pt denies abdominal pain, vomiting,  fever, urinary sxs, bowel or bladder sxs or any other acute complaints. The pt has an appointment next month with her Neurologist for further evaluation. She was told that her sxs may be due to a pinched nerve. Allergy: Sertraline [rxn: Other/nervousness], Codeine [rxn: Fever].

## 2019-11-11 ENCOUNTER — APPOINTMENT (OUTPATIENT)
Age: 64
End: 2019-11-11
Payer: MEDICAID

## 2019-11-11 VITALS
HEIGHT: 61 IN | SYSTOLIC BLOOD PRESSURE: 149 MMHG | BODY MASS INDEX: 13.41 KG/M2 | DIASTOLIC BLOOD PRESSURE: 86 MMHG | HEART RATE: 94 BPM | WEIGHT: 71 LBS

## 2019-11-11 PROCEDURE — 99204 OFFICE O/P NEW MOD 45 MIN: CPT

## 2019-11-11 NOTE — REVIEW OF SYSTEMS
[Feeling Tired] : feeling tired [Dry Eyes] : dryness of the eyes [Palpitations] : palpitations [Abdominal Pain] : abdominal pain [Constipation] : constipation [Shortness Of Breath] : shortness of breath [Told you have blood in urine on a urine test] : told blood was present in a urine test [see HPI] : see HPI [Blood in urine that you can see] : blood visible in urine [History of kidney stones] : history of kidney stones [Wake up at night to urinate  How many times?  ___] : wakes up to urinate [unfilled] times during the night [Joint Pain] : joint pain [Anxiety] : anxiety [Muscle Weakness] : muscle weakness [Feelings Of Weakness] : feelings of weakness [Negative] : Heme/Lymph

## 2019-11-17 NOTE — PHYSICAL EXAM
[General Appearance - Well Developed] : well developed [General Appearance - Well Nourished] : well nourished [Normal Appearance] : normal appearance [Well Groomed] : well groomed [General Appearance - In No Acute Distress] : no acute distress [Edema] : no peripheral edema [Respiration, Rhythm And Depth] : normal respiratory rhythm and effort [Abdomen Soft] : soft [Exaggerated Use Of Accessory Muscles For Inspiration] : no accessory muscle use [FreeTextEntry1] : multiple well-healed scars [Costovertebral Angle Tenderness] : no ~M costovertebral angle tenderness [Urinary Bladder Findings] : the bladder was normal on palpation [] : no rash [Normal Station and Gait] : the gait and station were normal for the patient's age [Oriented To Time, Place, And Person] : oriented to person, place, and time [No Focal Deficits] : no focal deficits [Affect] : the affect was normal [Not Anxious] : not anxious [Mood] : the mood was normal [No Palpable Adenopathy] : no palpable adenopathy

## 2019-11-17 NOTE — HISTORY OF PRESENT ILLNESS
[FreeTextEntry1] : 65 yo F presents with some gross hematuria a few weeks ago\par no dysuria but has been having abdominal pain\par Has had diffuse abdominal pain for a long time now\par Urinates just twice daily\par Drinks 2 bottles of water, no caffeine daily but previously wasn't drinking much fluids due to sensation of bloating\par chronic constipation\par No prior history of UTI or nephrolithiasis

## 2019-11-17 NOTE — ASSESSMENT
[FreeTextEntry1] : 65 yo F with history of abdominal pain, gross hematuria a few weeks ago\par \par - Discussed possible etiologies for microhematuria including benign (UTI, nephrolithiasis, cyst, BPH) vs malignancy (renal, ureteral and bladder). Discussed hematuria workup which includes CT urogram and cystoscopy. Discussed risk and benefits of cystoscopy.\par - UA, culture - once hematuria with no UTI confirmed, will plan to proceed with workup\par - Strongly encouraged pt to increase hydration and to void every 2 hours throughout the day.\par

## 2019-11-18 LAB
APPEARANCE: CLEAR
BACTERIA UR CULT: NORMAL
BACTERIA: NEGATIVE
BILIRUBIN URINE: NEGATIVE
BLOOD URINE: ABNORMAL
COLOR: YELLOW
GLUCOSE QUALITATIVE U: NEGATIVE
HYALINE CASTS: 2 /LPF
KETONES URINE: NEGATIVE
LEUKOCYTE ESTERASE URINE: NEGATIVE
MICROSCOPIC-UA: NORMAL
NITRITE URINE: NEGATIVE
PH URINE: 5.5
PROTEIN URINE: NORMAL
RED BLOOD CELLS URINE: 6 /HPF
SPECIFIC GRAVITY URINE: 1.02
SQUAMOUS EPITHELIAL CELLS: 1 /HPF
UROBILINOGEN URINE: NORMAL
WHITE BLOOD CELLS URINE: 5 /HPF

## 2019-11-25 ENCOUNTER — APPOINTMENT (OUTPATIENT)
Age: 64
End: 2019-11-25
Payer: MEDICAID

## 2019-11-25 DIAGNOSIS — R31.0 GROSS HEMATURIA: ICD-10-CM

## 2019-11-25 PROCEDURE — 52000 CYSTOURETHROSCOPY: CPT

## 2019-11-26 PROBLEM — R31.0 HEMATURIA, GROSS: Status: ACTIVE | Noted: 2019-11-11

## 2019-12-01 ENCOUNTER — OUTPATIENT (OUTPATIENT)
Dept: OUTPATIENT SERVICES | Facility: HOSPITAL | Age: 64
LOS: 1 days | End: 2019-12-01
Payer: MEDICAID

## 2019-12-01 DIAGNOSIS — Z98.890 OTHER SPECIFIED POSTPROCEDURAL STATES: Chronic | ICD-10-CM

## 2019-12-01 DIAGNOSIS — G51.0 BELL'S PALSY: Chronic | ICD-10-CM

## 2019-12-01 DIAGNOSIS — Z85.07 PERSONAL HISTORY OF MALIGNANT NEOPLASM OF PANCREAS: Chronic | ICD-10-CM

## 2019-12-01 PROCEDURE — G9001: CPT

## 2019-12-10 ENCOUNTER — INPATIENT (INPATIENT)
Facility: HOSPITAL | Age: 64
LOS: 9 days | Discharge: HOME | End: 2019-12-20
Attending: SPECIALIST | Admitting: SPECIALIST
Payer: MEDICAID

## 2019-12-10 VITALS
TEMPERATURE: 98 F | DIASTOLIC BLOOD PRESSURE: 56 MMHG | HEART RATE: 77 BPM | OXYGEN SATURATION: 100 % | SYSTOLIC BLOOD PRESSURE: 125 MMHG | RESPIRATION RATE: 16 BRPM

## 2019-12-10 DIAGNOSIS — Z98.890 OTHER SPECIFIED POSTPROCEDURAL STATES: Chronic | ICD-10-CM

## 2019-12-10 DIAGNOSIS — G51.0 BELL'S PALSY: Chronic | ICD-10-CM

## 2019-12-10 DIAGNOSIS — Z85.07 PERSONAL HISTORY OF MALIGNANT NEOPLASM OF PANCREAS: Chronic | ICD-10-CM

## 2019-12-10 DIAGNOSIS — R10.9 UNSPECIFIED ABDOMINAL PAIN: ICD-10-CM

## 2019-12-10 LAB
ALBUMIN SERPL ELPH-MCNC: 4.1 G/DL — SIGNIFICANT CHANGE UP (ref 3.3–5)
ALP SERPL-CCNC: 65 U/L — SIGNIFICANT CHANGE UP (ref 40–120)
ALT FLD-CCNC: 7 U/L — SIGNIFICANT CHANGE UP (ref 4–33)
ANION GAP SERPL CALC-SCNC: 14 MMO/L — SIGNIFICANT CHANGE UP (ref 7–14)
APPEARANCE UR: CLEAR — SIGNIFICANT CHANGE UP
AST SERPL-CCNC: 15 U/L — SIGNIFICANT CHANGE UP (ref 4–32)
BACTERIA # UR AUTO: NEGATIVE — SIGNIFICANT CHANGE UP
BASE EXCESS BLDV CALC-SCNC: 6.2 MMOL/L — SIGNIFICANT CHANGE UP
BASOPHILS # BLD AUTO: 0.02 K/UL — SIGNIFICANT CHANGE UP (ref 0–0.2)
BASOPHILS NFR BLD AUTO: 0.4 % — SIGNIFICANT CHANGE UP (ref 0–2)
BILIRUB SERPL-MCNC: 0.3 MG/DL — SIGNIFICANT CHANGE UP (ref 0.2–1.2)
BILIRUB UR-MCNC: NEGATIVE — SIGNIFICANT CHANGE UP
BLOOD GAS VENOUS - CREATININE: 0.79 MG/DL — SIGNIFICANT CHANGE UP (ref 0.5–1.3)
BLOOD UR QL VISUAL: SIGNIFICANT CHANGE UP
BUN SERPL-MCNC: 17 MG/DL — SIGNIFICANT CHANGE UP (ref 7–23)
CALCIUM SERPL-MCNC: 9.2 MG/DL — SIGNIFICANT CHANGE UP (ref 8.4–10.5)
CHLORIDE BLDV-SCNC: 102 MMOL/L — SIGNIFICANT CHANGE UP (ref 96–108)
CHLORIDE SERPL-SCNC: 102 MMOL/L — SIGNIFICANT CHANGE UP (ref 98–107)
CO2 SERPL-SCNC: 25 MMOL/L — SIGNIFICANT CHANGE UP (ref 22–31)
COLOR SPEC: SIGNIFICANT CHANGE UP
CREAT SERPL-MCNC: 0.79 MG/DL — SIGNIFICANT CHANGE UP (ref 0.5–1.3)
EOSINOPHIL # BLD AUTO: 0.03 K/UL — SIGNIFICANT CHANGE UP (ref 0–0.5)
EOSINOPHIL NFR BLD AUTO: 0.7 % — SIGNIFICANT CHANGE UP (ref 0–6)
GAS PNL BLDV: 140 MMOL/L — SIGNIFICANT CHANGE UP (ref 136–146)
GLUCOSE BLDV-MCNC: 84 MG/DL — SIGNIFICANT CHANGE UP (ref 70–99)
GLUCOSE SERPL-MCNC: 87 MG/DL — SIGNIFICANT CHANGE UP (ref 70–99)
GLUCOSE UR-MCNC: NEGATIVE — SIGNIFICANT CHANGE UP
HCO3 BLDV-SCNC: 28 MMOL/L — HIGH (ref 20–27)
HCT VFR BLD CALC: 39 % — SIGNIFICANT CHANGE UP (ref 34.5–45)
HCT VFR BLDV CALC: 39.8 % — SIGNIFICANT CHANGE UP (ref 34.5–45)
HGB BLD-MCNC: 12.7 G/DL — SIGNIFICANT CHANGE UP (ref 11.5–15.5)
HGB BLDV-MCNC: 12.9 G/DL — SIGNIFICANT CHANGE UP (ref 11.5–15.5)
HYALINE CASTS # UR AUTO: NEGATIVE — SIGNIFICANT CHANGE UP
IMM GRANULOCYTES NFR BLD AUTO: 0.2 % — SIGNIFICANT CHANGE UP (ref 0–1.5)
KETONES UR-MCNC: NEGATIVE — SIGNIFICANT CHANGE UP
LACTATE BLDV-MCNC: 1 MMOL/L — SIGNIFICANT CHANGE UP (ref 0.5–2)
LEUKOCYTE ESTERASE UR-ACNC: NEGATIVE — SIGNIFICANT CHANGE UP
LIDOCAIN IGE QN: 26.2 U/L — SIGNIFICANT CHANGE UP (ref 7–60)
LYMPHOCYTES # BLD AUTO: 1.69 K/UL — SIGNIFICANT CHANGE UP (ref 1–3.3)
LYMPHOCYTES # BLD AUTO: 37.6 % — SIGNIFICANT CHANGE UP (ref 13–44)
MCHC RBC-ENTMCNC: 29.5 PG — SIGNIFICANT CHANGE UP (ref 27–34)
MCHC RBC-ENTMCNC: 32.6 % — SIGNIFICANT CHANGE UP (ref 32–36)
MCV RBC AUTO: 90.5 FL — SIGNIFICANT CHANGE UP (ref 80–100)
MONOCYTES # BLD AUTO: 0.49 K/UL — SIGNIFICANT CHANGE UP (ref 0–0.9)
MONOCYTES NFR BLD AUTO: 10.9 % — SIGNIFICANT CHANGE UP (ref 2–14)
NEUTROPHILS # BLD AUTO: 2.26 K/UL — SIGNIFICANT CHANGE UP (ref 1.8–7.4)
NEUTROPHILS NFR BLD AUTO: 50.2 % — SIGNIFICANT CHANGE UP (ref 43–77)
NITRITE UR-MCNC: NEGATIVE — SIGNIFICANT CHANGE UP
NRBC # FLD: 0 K/UL — SIGNIFICANT CHANGE UP (ref 0–0)
PCO2 BLDV: 52 MMHG — HIGH (ref 41–51)
PH BLDV: 7.39 PH — SIGNIFICANT CHANGE UP (ref 7.32–7.43)
PH UR: 8 — SIGNIFICANT CHANGE UP (ref 5–8)
PLATELET # BLD AUTO: 204 K/UL — SIGNIFICANT CHANGE UP (ref 150–400)
PMV BLD: 10.5 FL — SIGNIFICANT CHANGE UP (ref 7–13)
PO2 BLDV: 25 MMHG — LOW (ref 35–40)
POTASSIUM BLDV-SCNC: 3.3 MMOL/L — LOW (ref 3.4–4.5)
POTASSIUM SERPL-MCNC: 3.5 MMOL/L — SIGNIFICANT CHANGE UP (ref 3.5–5.3)
POTASSIUM SERPL-SCNC: 3.5 MMOL/L — SIGNIFICANT CHANGE UP (ref 3.5–5.3)
PROT SERPL-MCNC: 7.5 G/DL — SIGNIFICANT CHANGE UP (ref 6–8.3)
PROT UR-MCNC: NEGATIVE — SIGNIFICANT CHANGE UP
RBC # BLD: 4.31 M/UL — SIGNIFICANT CHANGE UP (ref 3.8–5.2)
RBC # FLD: 12.7 % — SIGNIFICANT CHANGE UP (ref 10.3–14.5)
RBC CASTS # UR COMP ASSIST: HIGH (ref 0–?)
SAO2 % BLDV: 39.9 % — LOW (ref 60–85)
SODIUM SERPL-SCNC: 141 MMOL/L — SIGNIFICANT CHANGE UP (ref 135–145)
SP GR SPEC: 1.02 — SIGNIFICANT CHANGE UP (ref 1–1.04)
SQUAMOUS # UR AUTO: SIGNIFICANT CHANGE UP
UROBILINOGEN FLD QL: NORMAL — SIGNIFICANT CHANGE UP
WBC # BLD: 4.5 K/UL — SIGNIFICANT CHANGE UP (ref 3.8–10.5)
WBC # FLD AUTO: 4.5 K/UL — SIGNIFICANT CHANGE UP (ref 3.8–10.5)
WBC UR QL: SIGNIFICANT CHANGE UP (ref 0–?)

## 2019-12-10 RX ORDER — KETOROLAC TROMETHAMINE 30 MG/ML
15 SYRINGE (ML) INJECTION ONCE
Refills: 0 | Status: DISCONTINUED | OUTPATIENT
Start: 2019-12-10 | End: 2019-12-10

## 2019-12-10 RX ORDER — SODIUM CHLORIDE 9 MG/ML
1000 INJECTION, SOLUTION INTRAVENOUS
Refills: 0 | Status: DISCONTINUED | OUTPATIENT
Start: 2019-12-10 | End: 2019-12-12

## 2019-12-10 RX ORDER — ACETAMINOPHEN 500 MG
650 TABLET ORAL EVERY 6 HOURS
Refills: 0 | Status: DISCONTINUED | OUTPATIENT
Start: 2019-12-10 | End: 2019-12-10

## 2019-12-10 RX ORDER — FAMOTIDINE 10 MG/ML
20 INJECTION INTRAVENOUS ONCE
Refills: 0 | Status: COMPLETED | OUTPATIENT
Start: 2019-12-10 | End: 2019-12-10

## 2019-12-10 RX ORDER — ENOXAPARIN SODIUM 100 MG/ML
30 INJECTION SUBCUTANEOUS AT BEDTIME
Refills: 0 | Status: DISCONTINUED | OUTPATIENT
Start: 2019-12-10 | End: 2019-12-20

## 2019-12-10 RX ADMIN — Medication 15 MILLIGRAM(S): at 22:30

## 2019-12-10 RX ADMIN — SODIUM CHLORIDE 75 MILLILITER(S): 9 INJECTION, SOLUTION INTRAVENOUS at 23:37

## 2019-12-10 RX ADMIN — FAMOTIDINE 20 MILLIGRAM(S): 10 INJECTION INTRAVENOUS at 21:50

## 2019-12-10 RX ADMIN — Medication 15 MILLIGRAM(S): at 21:51

## 2019-12-10 NOTE — ED PROVIDER NOTE - CLINICAL SUMMARY MEDICAL DECISION MAKING FREE TEXT BOX
64F w/ chronic worsening abd pain - sent by GI doc for surgical admission for possible endoscopy, abdominal ultrasound, ERCP - will d/w GI doc, surgical service, check labs/urine, give pain meds, reassess, likely admit

## 2019-12-10 NOTE — ED PROVIDER NOTE - PHYSICAL EXAMINATION
*GEN:   mildly uncomfortable, AOx3  *EYES:   pupils equally round and reactive to light  *HEENT:   airway patent, full ROM neck  *CV:   regular rate and rhythm  *RESP:   clear to auscultation bilaterally, non-labored  *ABD:   soft, tender para umbilical  *:   no cva/flank tenderness  *EXTREM:   no MSK tenderness, full ROM throughout, no leg swelling  *SKIN:   dry, intact  *NEURO:   AOx3, no focal weakness or loss of sensation

## 2019-12-10 NOTE — H&P ADULT - NSICDXPASTMEDICALHX_GEN_ALL_CORE_FT
PAST MEDICAL HISTORY:  Cholelithiasis, Common Bile Duct 1990    Depression     FUO (Fever of Unknown Origin) 1964- right facial palsy    Incisional hernia     Pancreatic cancer s/p Whipple in 2011

## 2019-12-10 NOTE — ED ADULT NURSE NOTE - CHIEF COMPLAINT QUOTE
Worsening chronic lower abdominal pain, last bm yesterday, denies n+v/fevers/chills/chest pain/SOB. Sent from MD [facility] office, office stated would like patient to be admitted to MD Damion Mauro. Hx Pancreatic CA, [place] Palsy,

## 2019-12-10 NOTE — ED ADULT TRIAGE NOTE - CHIEF COMPLAINT QUOTE
Worsening chronic lower abdominal pain, last bm yesterday, denies n+v/fevers/chills/chest pain/SOB. Sent from MD [facility] office, office stated would like patient to be admitted to MD Damion Mauro. Hx Pancreatic CA, [place] Palsy,

## 2019-12-10 NOTE — ED PROVIDER NOTE - ATTENDING CONTRIBUTION TO CARE
Luis: 63 yo female with a h/o Cholelithiasis, Depression, Pancreatic Ca s/p Whipple in 2011, L inguinal Hernia Repair in 2017, Lap Cholecystectomy sent in by Dr West for admission to Dr Mauro. Pt has had chronic periumbilical abdominal pain since her last surgery- worse over the last 1-2 months. + associated hematuria. Pt was found to have dilated pancreatic duct. +decreased PO intake but normal BM's. No acute change today. No associated nausea, vomiting ro fevers. Exam: GENERAL: chronically ill appearing, NAD, HEENT: MMM, no scleral icterus, CARDIO: +S1/S2, no murmurs, rubs or gallops, LUNGS: CTA B/L, no wheezing, rales or rhonchi, ABD: cachectic, soft, mild epigastric TTP, BSx4 quadrants, no guarding or rigidity. MSK: NO CVA TTP EXT: No LE edema or calf TTP, 2+ distal pulses x 4 extremities. NEURO: AxOx3, SKIN: no rashes or lesions, well perfused A/P- 63 yo female with chronic pain and now dilated pancreatic duct. will obtain labs, consult surgery and admit.

## 2019-12-10 NOTE — ED ADULT NURSE NOTE - DRUG PRE-SCREENING (DAST -1)
Statement Selected
Pt was recently released from [facility] where she was being treated for pneumonia.  Today she felt "winded" and was guided to floor by home aide.  No loc.  Upon arrival, she is alert and interactive, denies pain currently, but states she had chest pain earlier.  Lungs clear, but diminished on right side.  Pt denies fevers, has moist sounding cough, clear nasal discharge and occasional sneezing. She has been using O2 @ home, only @ night.  There are old bruises on forearms from recent hospitalization.

## 2019-12-10 NOTE — H&P ADULT - HISTORY OF PRESENT ILLNESS
62F with h/o pancreatic ca s/p whipple procedure in 2011 with complication of incisional hernia repaired May 2017, depression, anxiety (now off meds), anorexia,  bells palsy present to the ED c/o abdominal pain for the past 2 years that has been getting progressively worse. Pt was seen by her GI today for routine follow up and pt was sent in to be admitted for further workup. Pt has been able to tolerate diet, denies N/V, bowel movement at baseline, passing gas. Denies fevers, chills, CP, SOB. 62F with h/o pancreatic ca s/p whipple procedure in 2011 with complication of incisional hernia repaired May 2017 c/b enterotomy necessitating RNY Ade-ade, depression, anxiety (now off meds), anorexia,  bells palsy present to the ED c/o abdominal pain for the past 2 years that has been getting progressively worse over the past two months. Pt was seen by her GI (Dr. West) today for routine follow up and pt was sent in to be admitted for further workup. Pt has been able to tolerate diet, denies N/V, bowel movement at baseline, passing gas. Denies fevers, chills, CP, SOB.

## 2019-12-10 NOTE — ED ADULT NURSE NOTE - OBJECTIVE STATEMENT
65yo pt A&Ox4, ambulatory. Pt with hx of pancreatic ca, no chemo treatment. Pt c/o of generalized abdominal pain worsening this month. Bowel sounds present. last BM yesterday. Pt denies headache, chest pain, n/v/d, chill, fever, dizziness, weakness, and shortness of breath. 20G Iv placed in left antecubital, lab drawn and sent. Will continue to monitor

## 2019-12-10 NOTE — H&P ADULT - NSHPPHYSICALEXAM_GEN_ALL_CORE
GEN: NAD  CV: RRR  Pulm: Unlabored breathing on RA  Abd: soft, NDNT, well healed midline incision   Ext: warm to touch, well perfused

## 2019-12-10 NOTE — ED PROVIDER NOTE - OBJECTIVE STATEMENT
64F w/ pmh Cholelithiasis, Depression, Pancreatic Ca s/p Whipple in 2011, L inguinal Hernia Repair in 2017, Lap Cholecystectomy -- sent by Dr. Daren West for admission. Patient has been having para-umbilical / diffuse abd pain chronically, worse in past 1-2 months, assc w/ hematuria so underwent CTAP w/ urogram 11/22/2019 which demonstrated prominent main pancreatic dudct 4mm and tail larger than 2018. Pt has had decr po. Passing gas, normal BM yesterday.     D/w arnol

## 2019-12-10 NOTE — H&P ADULT - NSICDXPASTSURGICALHX_GEN_ALL_CORE_FT
PAST SURGICAL HISTORY:  Bell's palsy s/p surgery    H/O hernia repair     H/O pancreatic cancer s/p Whipple procedure 2011    S/P Breast Biopsy, Right benign-1987    s/p hemorrhoidectomy-1994     s/p lap cholecystectomy- 1990

## 2019-12-10 NOTE — H&P ADULT - ASSESSMENT
62F with h/o pancreatic ca s/p whipple procedure in 2011 with complication of incisional hernia repaired May 2017, depression, anxiety (now off meds), anorexia,  bells palsy present to the ED c/o abdominal pain for the past 2 years that has been getting progressively worse.     - Admit to Dr. Mauro   - F/u with CT final read  - Call medicine cs for anorexia  - GI w/u planning    - FLD w/ Ensure   - AM labs   - DVT ppx     D/w Dr. Mauro

## 2019-12-10 NOTE — H&P ADULT - NSICDXFAMILYHX_GEN_ALL_CORE_FT
FAMILY HISTORY:  Family history of COPD (chronic obstructive pulmonary disease)  Family history of hypertension    Sibling  Still living? Yes, Estimated age: Age Unknown  Family history of hypertension, Age at diagnosis: Age Unknown

## 2019-12-10 NOTE — ED PROVIDER NOTE - PROGRESS NOTE DETAILS
Jaren Dave PGY3: d/w Dr. West - chronic but worsening abd pain, endoscopy to rule out pancreatic ductus issue, Dr. Mauro to coordinate endoscopy Jaren Dave PGY3: paged surgery

## 2019-12-11 DIAGNOSIS — Z71.89 OTHER SPECIFIED COUNSELING: ICD-10-CM

## 2019-12-11 LAB
ANION GAP SERPL CALC-SCNC: 15 MMO/L — HIGH (ref 7–14)
APTT BLD: 35.1 SEC — SIGNIFICANT CHANGE UP (ref 27.5–36.3)
BUN SERPL-MCNC: 11 MG/DL — SIGNIFICANT CHANGE UP (ref 7–23)
CALCIUM SERPL-MCNC: 9.4 MG/DL — SIGNIFICANT CHANGE UP (ref 8.4–10.5)
CHLORIDE SERPL-SCNC: 100 MMOL/L — SIGNIFICANT CHANGE UP (ref 98–107)
CO2 SERPL-SCNC: 24 MMOL/L — SIGNIFICANT CHANGE UP (ref 22–31)
CREAT SERPL-MCNC: 0.74 MG/DL — SIGNIFICANT CHANGE UP (ref 0.5–1.3)
GLUCOSE SERPL-MCNC: 83 MG/DL — SIGNIFICANT CHANGE UP (ref 70–99)
HCT VFR BLD CALC: 39.6 % — SIGNIFICANT CHANGE UP (ref 34.5–45)
HCV AB S/CO SERPL IA: 0.16 S/CO — SIGNIFICANT CHANGE UP (ref 0–0.99)
HCV AB SERPL-IMP: SIGNIFICANT CHANGE UP
HGB BLD-MCNC: 12.9 G/DL — SIGNIFICANT CHANGE UP (ref 11.5–15.5)
INR BLD: 1.11 — SIGNIFICANT CHANGE UP (ref 0.88–1.17)
LIDOCAIN IGE QN: 24.6 U/L — SIGNIFICANT CHANGE UP (ref 7–60)
MAGNESIUM SERPL-MCNC: 2.2 MG/DL — SIGNIFICANT CHANGE UP (ref 1.6–2.6)
MCHC RBC-ENTMCNC: 29.2 PG — SIGNIFICANT CHANGE UP (ref 27–34)
MCHC RBC-ENTMCNC: 32.6 % — SIGNIFICANT CHANGE UP (ref 32–36)
MCV RBC AUTO: 89.6 FL — SIGNIFICANT CHANGE UP (ref 80–100)
NRBC # FLD: 0 K/UL — SIGNIFICANT CHANGE UP (ref 0–0)
PHOSPHATE SERPL-MCNC: 2.8 MG/DL — SIGNIFICANT CHANGE UP (ref 2.5–4.5)
PLATELET # BLD AUTO: 194 K/UL — SIGNIFICANT CHANGE UP (ref 150–400)
PMV BLD: 10.7 FL — SIGNIFICANT CHANGE UP (ref 7–13)
POTASSIUM SERPL-MCNC: 3.6 MMOL/L — SIGNIFICANT CHANGE UP (ref 3.5–5.3)
POTASSIUM SERPL-SCNC: 3.6 MMOL/L — SIGNIFICANT CHANGE UP (ref 3.5–5.3)
PROTHROM AB SERPL-ACNC: 12.7 SEC — SIGNIFICANT CHANGE UP (ref 9.8–13.1)
RBC # BLD: 4.42 M/UL — SIGNIFICANT CHANGE UP (ref 3.8–5.2)
RBC # FLD: 12.9 % — SIGNIFICANT CHANGE UP (ref 10.3–14.5)
SODIUM SERPL-SCNC: 139 MMOL/L — SIGNIFICANT CHANGE UP (ref 135–145)
SPECIMEN SOURCE: SIGNIFICANT CHANGE UP
WBC # BLD: 4.02 K/UL — SIGNIFICANT CHANGE UP (ref 3.8–10.5)
WBC # FLD AUTO: 4.02 K/UL — SIGNIFICANT CHANGE UP (ref 3.8–10.5)

## 2019-12-11 PROCEDURE — 99232 SBSQ HOSP IP/OBS MODERATE 35: CPT

## 2019-12-11 PROCEDURE — 74177 CT ABD & PELVIS W/CONTRAST: CPT | Mod: 26

## 2019-12-11 PROCEDURE — 99221 1ST HOSP IP/OBS SF/LOW 40: CPT

## 2019-12-11 RX ORDER — FAMOTIDINE 10 MG/ML
20 INJECTION INTRAVENOUS ONCE
Refills: 0 | Status: COMPLETED | OUTPATIENT
Start: 2019-12-11 | End: 2019-12-11

## 2019-12-11 RX ORDER — KETOROLAC TROMETHAMINE 30 MG/ML
15 SYRINGE (ML) INJECTION ONCE
Refills: 0 | Status: DISCONTINUED | OUTPATIENT
Start: 2019-12-11 | End: 2019-12-11

## 2019-12-11 RX ORDER — ACETAMINOPHEN 500 MG
650 TABLET ORAL EVERY 6 HOURS
Refills: 0 | Status: DISCONTINUED | OUTPATIENT
Start: 2019-12-11 | End: 2019-12-16

## 2019-12-11 RX ORDER — IBUPROFEN 200 MG
600 TABLET ORAL EVERY 6 HOURS
Refills: 0 | Status: DISCONTINUED | OUTPATIENT
Start: 2019-12-11 | End: 2019-12-12

## 2019-12-11 RX ORDER — OXYCODONE HYDROCHLORIDE 5 MG/1
5 TABLET ORAL EVERY 4 HOURS
Refills: 0 | Status: DISCONTINUED | OUTPATIENT
Start: 2019-12-11 | End: 2019-12-11

## 2019-12-11 RX ORDER — LANOLIN ALCOHOL/MO/W.PET/CERES
3 CREAM (GRAM) TOPICAL AT BEDTIME
Refills: 0 | Status: DISCONTINUED | OUTPATIENT
Start: 2019-12-11 | End: 2019-12-20

## 2019-12-11 RX ORDER — IBUPROFEN 200 MG
400 TABLET ORAL EVERY 6 HOURS
Refills: 0 | Status: DISCONTINUED | OUTPATIENT
Start: 2019-12-11 | End: 2019-12-11

## 2019-12-11 RX ADMIN — Medication 15 MILLIGRAM(S): at 17:26

## 2019-12-11 RX ADMIN — ENOXAPARIN SODIUM 30 MILLIGRAM(S): 100 INJECTION SUBCUTANEOUS at 21:34

## 2019-12-11 RX ADMIN — Medication 15 MILLIGRAM(S): at 17:11

## 2019-12-11 RX ADMIN — FAMOTIDINE 20 MILLIGRAM(S): 10 INJECTION INTRAVENOUS at 21:34

## 2019-12-11 RX ADMIN — Medication 3 MILLIGRAM(S): at 22:57

## 2019-12-11 RX ADMIN — Medication 650 MILLIGRAM(S): at 10:51

## 2019-12-11 RX ADMIN — SODIUM CHLORIDE 75 MILLILITER(S): 9 INJECTION, SOLUTION INTRAVENOUS at 17:11

## 2019-12-11 RX ADMIN — Medication 650 MILLIGRAM(S): at 11:25

## 2019-12-11 NOTE — PROGRESS NOTE ADULT - SUBJECTIVE AND OBJECTIVE BOX
D TEAM SURGERY PROGRESS NOTE    SUBJECTIVE: Patient seen and examined at bedside on AM rounds, patient without complaints. Awaiting final CT read. Denies current pain/nausea. Denies chest pain/SOB    Vital Signs Last 24 Hrs  T(C): 36.6 (11 Dec 2019 08:34), Max: 36.8 (10 Dec 2019 17:38)  T(F): 97.8 (11 Dec 2019 08:34), Max: 98.2 (10 Dec 2019 17:38)  HR: 74 (11 Dec 2019 08:34) (60 - 98)  BP: 151/93 (11 Dec 2019 08:34) (125/56 - 151/93)  BP(mean): --  RR: 18 (11 Dec 2019 08:34) (16 - 20)  SpO2: 100% (11 Dec 2019 08:) (98% - 100%)  I&O's Detail    MEDICATIONS  (STANDING):  enoxaparin Injectable 30 milliGRAM(s) SubCutaneous at bedtime  lactated ringers. 1000 milliLiter(s) (75 mL/Hr) IV Continuous <Continuous>    MEDICATIONS  (PRN):    Physical Exam  General: A&Ox3, NAD  Respiratory: Clear bilaterally, equal bilateral expansion  Cardiovascular: Regular rate & rhythm  Abdominal: Well healed midline incision, soft, non-tender, non-distended    LABS:                        12.9   4.02  )-----------( 194      ( 11 Dec 2019 05:20 )             39.6     12-    139  |  100  |  11  ----------------------------<  83  3.6   |  24  |  0.74    Ca    9.4      11 Dec 2019 05:20  Phos  2.8     -  Mg     2.2     -    TPro  7.5  /  Alb  4.1  /  TBili  0.3  /  DBili  x   /  AST  15  /  ALT  7   /  AlkPhos  65  12-10    PT/INR - ( 11 Dec 2019 05:20 )   PT: 12.7 SEC;   INR: 1.11          PTT - ( 11 Dec 2019 05:20 )  PTT:35.1 SEC  Urinalysis Basic - ( 10 Dec 2019 22:40 )    Color: LIGHT YELLOW / Appearance: CLEAR / S.020 / pH: 8.0  Gluc: NEGATIVE / Ketone: NEGATIVE  / Bili: NEGATIVE / Urobili: NORMAL   Blood: SMALL / Protein: NEGATIVE / Nitrite: NEGATIVE   Leuk Esterase: NEGATIVE / RBC: 6-10 / WBC 0-2   Sq Epi: OCC / Non Sq Epi: x / Bacteria: NEGATIVE

## 2019-12-11 NOTE — ED ADULT NURSE REASSESSMENT NOTE - NS ED NURSE REASSESS COMMENT FT1
Report received from night RN. Patient A&Ox4 reporting relief of abdominal pain at this time. No N/V/D. Appears comfortable. VS as noted. Awaiting further orders, will continue to monitor. Report received from night RN. Patient A&Ox4 reporting relief of abdominal pain at this time. No N/V/D. Appears comfortable. IV placed by night RN clear and patent. VS as noted. Awaiting further orders, will continue to monitor.

## 2019-12-11 NOTE — ED ADULT NURSE REASSESSMENT NOTE - NS ED NURSE REASSESS COMMENT FT1
Report received from break coverage RN. PT A&Ox4, respiration even and non-labored. Will continue to monitor

## 2019-12-11 NOTE — PROGRESS NOTE ADULT - ASSESSMENT
62F with h/o pancreatic ca s/p whipple procedure in 2011 with complication of incisional hernia repaired May 2017, depression, anxiety (now off meds), anorexia,  bells palsy present to the ED c/o abdominal pain for the past 2 years that has been getting progressively worse.   - CT abdomen/pelvis negative for acute intra-abdominal pathology  - Medicine f/u regarding anorexia  - GI w/u planning    - FLD w/ Ensure, as tolerated  - DVT ppx w/ Lovenox    D Team Surgery #[id number]

## 2019-12-11 NOTE — ED ADULT NURSE REASSESSMENT NOTE - NS ED NURSE REASSESS COMMENT FT1
Pt A&Ox4, resting comfortably, respiration even and non-labored. Awaiting for bed. Pt A&Ox4, resting comfortably, respiration even and non-labored. Will continue to monitor

## 2019-12-11 NOTE — CONSULT NOTE ADULT - ASSESSMENT
62F w/ hx of pancreatic cancer s/p whipple and now s/p RNY and Billroth II anatomy, p/w worsening epigastric pain. Sent in by outside GI for EGD to eval for PUD.  Impression:  #Epigastric pain - d/dx includes PUD/erosive gastritis, vs non GI causes of pain  #Pancreatic cancer s/p whipple    Recommendations:  - pain control per primary team AVOID NSAIDS  - NPO @ MN  - EGD in AM    Gustabo Woodward  Gastroenterology Fellow  Pager# [phone] or [phone]  Page on-call GI fellow after 5pm or on weekends

## 2019-12-12 ENCOUNTER — RESULT REVIEW (OUTPATIENT)
Age: 64
End: 2019-12-12

## 2019-12-12 LAB
ANION GAP SERPL CALC-SCNC: 13 MMO/L — SIGNIFICANT CHANGE UP (ref 7–14)
BACTERIA UR CULT: SIGNIFICANT CHANGE UP
BASOPHILS # BLD AUTO: 0.04 K/UL — SIGNIFICANT CHANGE UP (ref 0–0.2)
BASOPHILS NFR BLD AUTO: 1.2 % — SIGNIFICANT CHANGE UP (ref 0–2)
BUN SERPL-MCNC: 12 MG/DL — SIGNIFICANT CHANGE UP (ref 7–23)
CALCIUM SERPL-MCNC: 9.1 MG/DL — SIGNIFICANT CHANGE UP (ref 8.4–10.5)
CHLORIDE SERPL-SCNC: 100 MMOL/L — SIGNIFICANT CHANGE UP (ref 98–107)
CO2 SERPL-SCNC: 24 MMOL/L — SIGNIFICANT CHANGE UP (ref 22–31)
CREAT SERPL-MCNC: 0.71 MG/DL — SIGNIFICANT CHANGE UP (ref 0.5–1.3)
EOSINOPHIL # BLD AUTO: 0.08 K/UL — SIGNIFICANT CHANGE UP (ref 0–0.5)
EOSINOPHIL NFR BLD AUTO: 2.4 % — SIGNIFICANT CHANGE UP (ref 0–6)
GLUCOSE SERPL-MCNC: 82 MG/DL — SIGNIFICANT CHANGE UP (ref 70–99)
HCT VFR BLD CALC: 37.4 % — SIGNIFICANT CHANGE UP (ref 34.5–45)
HGB BLD-MCNC: 12.2 G/DL — SIGNIFICANT CHANGE UP (ref 11.5–15.5)
IMM GRANULOCYTES NFR BLD AUTO: 0.3 % — SIGNIFICANT CHANGE UP (ref 0–1.5)
LYMPHOCYTES # BLD AUTO: 1.45 K/UL — SIGNIFICANT CHANGE UP (ref 1–3.3)
LYMPHOCYTES # BLD AUTO: 42.8 % — SIGNIFICANT CHANGE UP (ref 13–44)
MAGNESIUM SERPL-MCNC: 2.2 MG/DL — SIGNIFICANT CHANGE UP (ref 1.6–2.6)
MCHC RBC-ENTMCNC: 29.5 PG — SIGNIFICANT CHANGE UP (ref 27–34)
MCHC RBC-ENTMCNC: 32.6 % — SIGNIFICANT CHANGE UP (ref 32–36)
MCV RBC AUTO: 90.3 FL — SIGNIFICANT CHANGE UP (ref 80–100)
MONOCYTES # BLD AUTO: 0.4 K/UL — SIGNIFICANT CHANGE UP (ref 0–0.9)
MONOCYTES NFR BLD AUTO: 11.8 % — SIGNIFICANT CHANGE UP (ref 2–14)
NEUTROPHILS # BLD AUTO: 1.41 K/UL — LOW (ref 1.8–7.4)
NEUTROPHILS NFR BLD AUTO: 41.5 % — LOW (ref 43–77)
NRBC # FLD: 0 K/UL — SIGNIFICANT CHANGE UP (ref 0–0)
PHOSPHATE SERPL-MCNC: 3.4 MG/DL — SIGNIFICANT CHANGE UP (ref 2.5–4.5)
PLATELET # BLD AUTO: 169 K/UL — SIGNIFICANT CHANGE UP (ref 150–400)
PMV BLD: 10.7 FL — SIGNIFICANT CHANGE UP (ref 7–13)
POTASSIUM SERPL-MCNC: 3.5 MMOL/L — SIGNIFICANT CHANGE UP (ref 3.5–5.3)
POTASSIUM SERPL-SCNC: 3.5 MMOL/L — SIGNIFICANT CHANGE UP (ref 3.5–5.3)
RBC # BLD: 4.14 M/UL — SIGNIFICANT CHANGE UP (ref 3.8–5.2)
RBC # FLD: 12.8 % — SIGNIFICANT CHANGE UP (ref 10.3–14.5)
SODIUM SERPL-SCNC: 137 MMOL/L — SIGNIFICANT CHANGE UP (ref 135–145)
WBC # BLD: 3.39 K/UL — LOW (ref 3.8–10.5)
WBC # FLD AUTO: 3.39 K/UL — LOW (ref 3.8–10.5)

## 2019-12-12 PROCEDURE — 99232 SBSQ HOSP IP/OBS MODERATE 35: CPT

## 2019-12-12 PROCEDURE — 88312 SPECIAL STAINS GROUP 1: CPT | Mod: 26

## 2019-12-12 PROCEDURE — 43239 EGD BIOPSY SINGLE/MULTIPLE: CPT | Mod: GC

## 2019-12-12 PROCEDURE — 88305 TISSUE EXAM BY PATHOLOGIST: CPT | Mod: 26

## 2019-12-12 RX ORDER — PANTOPRAZOLE SODIUM 20 MG/1
40 TABLET, DELAYED RELEASE ORAL
Refills: 0 | Status: DISCONTINUED | OUTPATIENT
Start: 2019-12-12 | End: 2019-12-20

## 2019-12-12 RX ORDER — SUCRALFATE 1 G
1 TABLET ORAL
Refills: 0 | Status: DISCONTINUED | OUTPATIENT
Start: 2019-12-12 | End: 2019-12-20

## 2019-12-12 RX ORDER — GABAPENTIN 400 MG/1
100 CAPSULE ORAL
Refills: 0 | Status: DISCONTINUED | OUTPATIENT
Start: 2019-12-12 | End: 2019-12-16

## 2019-12-12 RX ADMIN — Medication 650 MILLIGRAM(S): at 15:59

## 2019-12-12 RX ADMIN — Medication 650 MILLIGRAM(S): at 14:46

## 2019-12-12 RX ADMIN — Medication 1 GRAM(S): at 17:42

## 2019-12-12 RX ADMIN — Medication 3 MILLIGRAM(S): at 23:16

## 2019-12-12 RX ADMIN — ENOXAPARIN SODIUM 30 MILLIGRAM(S): 100 INJECTION SUBCUTANEOUS at 23:16

## 2019-12-12 RX ADMIN — GABAPENTIN 100 MILLIGRAM(S): 400 CAPSULE ORAL at 20:22

## 2019-12-12 RX ADMIN — Medication 1 GRAM(S): at 23:16

## 2019-12-12 NOTE — PROGRESS NOTE ADULT - ASSESSMENT
62F with h/o pancreatic ca s/p whipple procedure in 2011 with complication of incisional hernia repaired May 2017, depression, anxiety (now off meds), anorexia,  bells palsy present to the ED c/o abdominal pain for the past 2 years that has been getting progressively worse.   - CT abdomen/pelvis negative for acute intra-abdominal pathology  - Medicine f/u regarding anorexia  - GI plan for EGD today  - NPO currently, tolerated regular diet yesterday  - DVT ppx w/ Lovenox    D Team Surgery #[id number]

## 2019-12-12 NOTE — PROGRESS NOTE ADULT - SUBJECTIVE AND OBJECTIVE BOX
D TEAM SURGERY PROGRESS NOTE    SUBJECTIVE: Patient seen and examined at bedside on AM rounds, had pain overnight, slightly improved by PO intake though has been NPOpMN for EGD with GI. Reports that pain is only slightly improved with toradol, refuses to take narcotics for pain control. Denies chest pain/SOB. Denies nausea/vomiting.    Vital Signs Last 24 Hrs  T(C): 36.5 (12 Dec 2019 11:05), Max: 36.9 (11 Dec 2019 15:49)  T(F): 97.7 (12 Dec 2019 11:05), Max: 98.5 (11 Dec 2019 15:49)  HR: 68 (12 Dec 2019 11:20) (60 - 80)  BP: 151/85 (12 Dec 2019 11:20) (119/77 - 167/92)  BP(mean): --  RR: 16 (12 Dec 2019 11:20) (11 - 18)  SpO2: 100% (12 Dec 2019 11:20) (98% - 100%)  I&O's Detail    MEDICATIONS  (STANDING):  enoxaparin Injectable 30 milliGRAM(s) SubCutaneous at bedtime  lactated ringers. 1000 milliLiter(s) (75 mL/Hr) IV Continuous <Continuous>  melatonin 3 milliGRAM(s) Oral at bedtime    MEDICATIONS  (PRN):  acetaminophen   Tablet .. 650 milliGRAM(s) Oral every 6 hours PRN Mild Pain (1 - 3), Moderate Pain (4 - 6)  ibuprofen  Tablet. 600 milliGRAM(s) Oral every 6 hours PRN Moderate Pain (4 - 6), Severe Pain (7 - 10)      Physical Exam  General: A&Ox3, NAD  Respiratory: Clear bilaterally, equal bilateral expansion  Abdominal: Soft, mildly tender throughout, non-distended    LABS:                        12.2   3.39  )-----------( 169      ( 12 Dec 2019 06:00 )             37.4     12-12    137  |  100  |  12  ----------------------------<  82  3.5   |  24  |  0.71    Ca    9.1      12 Dec 2019 06:00  Phos  3.4     12-12  Mg     2.2     12-12    TPro  7.5  /  Alb  4.1  /  TBili  0.3  /  DBili  x   /  AST  15  /  ALT  7   /  AlkPhos  65  12-10    PT/INR - ( 11 Dec 2019 05:20 )   PT: 12.7 SEC;   INR: 1.11          PTT - ( 11 Dec 2019 05:20 )  PTT:35.1 SEC  Urinalysis Basic - ( 10 Dec 2019 22:40 )    Color: LIGHT YELLOW / Appearance: CLEAR / S.020 / pH: 8.0  Gluc: NEGATIVE / Ketone: NEGATIVE  / Bili: NEGATIVE / Urobili: NORMAL   Blood: SMALL / Protein: NEGATIVE / Nitrite: NEGATIVE   Leuk Esterase: NEGATIVE / RBC: 6-10 / WBC 0-2   Sq Epi: OCC / Non Sq Epi: x / Bacteria: NEGATIVE

## 2019-12-13 ENCOUNTER — TRANSCRIPTION ENCOUNTER (OUTPATIENT)
Age: 64
End: 2019-12-13

## 2019-12-13 LAB
ALBUMIN SERPL ELPH-MCNC: 3.6 G/DL — SIGNIFICANT CHANGE UP (ref 3.3–5)
ALP SERPL-CCNC: 57 U/L — SIGNIFICANT CHANGE UP (ref 40–120)
ALT FLD-CCNC: 8 U/L — SIGNIFICANT CHANGE UP (ref 4–33)
ANION GAP SERPL CALC-SCNC: 12 MMO/L — SIGNIFICANT CHANGE UP (ref 7–14)
AST SERPL-CCNC: 19 U/L — SIGNIFICANT CHANGE UP (ref 4–32)
BASOPHILS # BLD AUTO: 0.03 K/UL — SIGNIFICANT CHANGE UP (ref 0–0.2)
BASOPHILS NFR BLD AUTO: 0.8 % — SIGNIFICANT CHANGE UP (ref 0–2)
BILIRUB SERPL-MCNC: 0.5 MG/DL — SIGNIFICANT CHANGE UP (ref 0.2–1.2)
BUN SERPL-MCNC: 14 MG/DL — SIGNIFICANT CHANGE UP (ref 7–23)
CALCIUM SERPL-MCNC: 9.1 MG/DL — SIGNIFICANT CHANGE UP (ref 8.4–10.5)
CHLORIDE SERPL-SCNC: 100 MMOL/L — SIGNIFICANT CHANGE UP (ref 98–107)
CO2 SERPL-SCNC: 24 MMOL/L — SIGNIFICANT CHANGE UP (ref 22–31)
CREAT SERPL-MCNC: 0.78 MG/DL — SIGNIFICANT CHANGE UP (ref 0.5–1.3)
EOSINOPHIL # BLD AUTO: 0.12 K/UL — SIGNIFICANT CHANGE UP (ref 0–0.5)
EOSINOPHIL NFR BLD AUTO: 3.3 % — SIGNIFICANT CHANGE UP (ref 0–6)
GLUCOSE SERPL-MCNC: 83 MG/DL — SIGNIFICANT CHANGE UP (ref 70–99)
HCT VFR BLD CALC: 36.9 % — SIGNIFICANT CHANGE UP (ref 34.5–45)
HGB BLD-MCNC: 12.2 G/DL — SIGNIFICANT CHANGE UP (ref 11.5–15.5)
IMM GRANULOCYTES NFR BLD AUTO: 0.3 % — SIGNIFICANT CHANGE UP (ref 0–1.5)
LYMPHOCYTES # BLD AUTO: 1.25 K/UL — SIGNIFICANT CHANGE UP (ref 1–3.3)
LYMPHOCYTES # BLD AUTO: 34.1 % — SIGNIFICANT CHANGE UP (ref 13–44)
MAGNESIUM SERPL-MCNC: 2.1 MG/DL — SIGNIFICANT CHANGE UP (ref 1.6–2.6)
MCHC RBC-ENTMCNC: 29.8 PG — SIGNIFICANT CHANGE UP (ref 27–34)
MCHC RBC-ENTMCNC: 33.1 % — SIGNIFICANT CHANGE UP (ref 32–36)
MCV RBC AUTO: 90.2 FL — SIGNIFICANT CHANGE UP (ref 80–100)
MONOCYTES # BLD AUTO: 0.33 K/UL — SIGNIFICANT CHANGE UP (ref 0–0.9)
MONOCYTES NFR BLD AUTO: 9 % — SIGNIFICANT CHANGE UP (ref 2–14)
NEUTROPHILS # BLD AUTO: 1.93 K/UL — SIGNIFICANT CHANGE UP (ref 1.8–7.4)
NEUTROPHILS NFR BLD AUTO: 52.5 % — SIGNIFICANT CHANGE UP (ref 43–77)
NRBC # FLD: 0 K/UL — SIGNIFICANT CHANGE UP (ref 0–0)
PHOSPHATE SERPL-MCNC: 3.1 MG/DL — SIGNIFICANT CHANGE UP (ref 2.5–4.5)
PLATELET # BLD AUTO: 180 K/UL — SIGNIFICANT CHANGE UP (ref 150–400)
PMV BLD: 10.8 FL — SIGNIFICANT CHANGE UP (ref 7–13)
POTASSIUM SERPL-MCNC: 3.6 MMOL/L — SIGNIFICANT CHANGE UP (ref 3.5–5.3)
POTASSIUM SERPL-SCNC: 3.6 MMOL/L — SIGNIFICANT CHANGE UP (ref 3.5–5.3)
PROT SERPL-MCNC: 6.5 G/DL — SIGNIFICANT CHANGE UP (ref 6–8.3)
RBC # BLD: 4.09 M/UL — SIGNIFICANT CHANGE UP (ref 3.8–5.2)
RBC # FLD: 12.9 % — SIGNIFICANT CHANGE UP (ref 10.3–14.5)
SODIUM SERPL-SCNC: 136 MMOL/L — SIGNIFICANT CHANGE UP (ref 135–145)
WBC # BLD: 3.67 K/UL — LOW (ref 3.8–10.5)
WBC # FLD AUTO: 3.67 K/UL — LOW (ref 3.8–10.5)

## 2019-12-13 PROCEDURE — 74018 RADEX ABDOMEN 1 VIEW: CPT | Mod: 26

## 2019-12-13 PROCEDURE — 99231 SBSQ HOSP IP/OBS SF/LOW 25: CPT

## 2019-12-13 RX ORDER — LIDOCAINE 4 G/100G
1 CREAM TOPICAL DAILY
Refills: 0 | Status: DISCONTINUED | OUTPATIENT
Start: 2019-12-13 | End: 2019-12-20

## 2019-12-13 RX ORDER — MORPHINE SULFATE 50 MG/1
1 CAPSULE, EXTENDED RELEASE ORAL EVERY 4 HOURS
Refills: 0 | Status: DISCONTINUED | OUTPATIENT
Start: 2019-12-13 | End: 2019-12-16

## 2019-12-13 RX ORDER — SODIUM CHLORIDE 9 MG/ML
1000 INJECTION, SOLUTION INTRAVENOUS
Refills: 0 | Status: DISCONTINUED | OUTPATIENT
Start: 2019-12-13 | End: 2019-12-15

## 2019-12-13 RX ORDER — GABAPENTIN 400 MG/1
1 CAPSULE ORAL
Qty: 28 | Refills: 0
Start: 2019-12-13 | End: 2019-12-26

## 2019-12-13 RX ORDER — SUCRALFATE 1 G
1 TABLET ORAL
Qty: 56 | Refills: 0
Start: 2019-12-13 | End: 2019-12-26

## 2019-12-13 RX ORDER — PANTOPRAZOLE SODIUM 20 MG/1
1 TABLET, DELAYED RELEASE ORAL
Qty: 14 | Refills: 0
Start: 2019-12-13 | End: 2019-12-26

## 2019-12-13 RX ORDER — MORPHINE SULFATE 50 MG/1
2 CAPSULE, EXTENDED RELEASE ORAL ONCE
Refills: 0 | Status: DISCONTINUED | OUTPATIENT
Start: 2019-12-13 | End: 2019-12-13

## 2019-12-13 RX ADMIN — Medication 1 GRAM(S): at 17:08

## 2019-12-13 RX ADMIN — LIDOCAINE 1 PATCH: 4 CREAM TOPICAL at 23:02

## 2019-12-13 RX ADMIN — Medication 650 MILLIGRAM(S): at 17:08

## 2019-12-13 RX ADMIN — GABAPENTIN 100 MILLIGRAM(S): 400 CAPSULE ORAL at 17:08

## 2019-12-13 RX ADMIN — LIDOCAINE 1 PATCH: 4 CREAM TOPICAL at 11:02

## 2019-12-13 RX ADMIN — Medication 1 GRAM(S): at 11:01

## 2019-12-13 RX ADMIN — MORPHINE SULFATE 2 MILLIGRAM(S): 50 CAPSULE, EXTENDED RELEASE ORAL at 14:23

## 2019-12-13 RX ADMIN — ENOXAPARIN SODIUM 30 MILLIGRAM(S): 100 INJECTION SUBCUTANEOUS at 21:35

## 2019-12-13 RX ADMIN — Medication 650 MILLIGRAM(S): at 09:40

## 2019-12-13 RX ADMIN — Medication 650 MILLIGRAM(S): at 08:40

## 2019-12-13 RX ADMIN — PANTOPRAZOLE SODIUM 40 MILLIGRAM(S): 20 TABLET, DELAYED RELEASE ORAL at 05:28

## 2019-12-13 RX ADMIN — GABAPENTIN 100 MILLIGRAM(S): 400 CAPSULE ORAL at 11:03

## 2019-12-13 RX ADMIN — MORPHINE SULFATE 2 MILLIGRAM(S): 50 CAPSULE, EXTENDED RELEASE ORAL at 15:00

## 2019-12-13 RX ADMIN — Medication 1 GRAM(S): at 05:28

## 2019-12-13 RX ADMIN — LIDOCAINE 1 PATCH: 4 CREAM TOPICAL at 19:53

## 2019-12-13 RX ADMIN — SODIUM CHLORIDE 75 MILLILITER(S): 9 INJECTION, SOLUTION INTRAVENOUS at 16:29

## 2019-12-13 NOTE — DISCHARGE NOTE PROVIDER - NSDCFUADDINST_GEN_ALL_CORE_FT
NO NSAIDS (Ibuprofen/Aleve)    Please call to schedule a follow up appointment with Dr. Mauro and Dr. West in the office in 1-2 weeks.

## 2019-12-13 NOTE — PROGRESS NOTE ADULT - SUBJECTIVE AND OBJECTIVE BOX
ANESTHESIA POSTOP CHECK    64y Female POSTOP DAY 1 S/P EGD    Vital Signs Last 24 Hrs  T(C): 36.2 (13 Dec 2019 08:43), Max: 36.8 (12 Dec 2019 09:38)  T(F): 97.2 (13 Dec 2019 08:43), Max: 98.3 (13 Dec 2019 00:03)  HR: 89 (13 Dec 2019 08:43) (54 - 95)  BP: 119/83 (13 Dec 2019 08:43) (105/68 - 155/73)  BP(mean): --  RR: 16 (13 Dec 2019 08:43) (11 - 18)  SpO2: 97% (13 Dec 2019 08:43) (97% - 100%)  I&O's Summary    12 Dec 2019 07:01  -  13 Dec 2019 07:00  --------------------------------------------------------  IN: 0 mL / OUT: 900 mL / NET: -900 mL        [x ] NO APPARENT ANESTHESIA COMPLICATIONS

## 2019-12-13 NOTE — DIETITIAN INITIAL EVALUATION ADULT. - OTHER INFO
Pt is s/p Whipple procedure in 2011.  Pt states that her appetite has been good and she has been eating. She states that eating does not cause pain but then she states she cannot eat because of the pain,. Pt is s/p Whipple procedure in 2011. Prior to that surgery Pt weighed 145 lbs. Pt lost a great deal of weight after it but could not remember what it was. In 2017, Pt had a hernia repair and weighed 112 lbs. Since then Pt has continued to loose weight and currently weighs 73 lbs. Pt had bee experiencing abdominal pain for about 2 years but has gotten progressively worse over the last 2 months. Pt states that her appetite has been good and she has been eating. She states that eating does not cause pain but then she states she cannot eat because of the pain.  Pt has a protruding clavicle and severe temporal wasting, both signs of severe malnutrition. She also has fat wasting in the buccal region also a sign of severe malnutrition. Pt has no difficulty chewing or swallowing. Pt refused nutritional supplements.

## 2019-12-13 NOTE — PROGRESS NOTE ADULT - ASSESSMENT
62F with h/o pancreatic ca s/p whipple procedure in 2011 with complication of incisional hernia repaired May 2017, depression, anxiety (now off meds), anorexia,  bells palsy present to the ED c/o abdominal pain for the past 2 years that has been getting progressively worse.   - Medicine f/u regarding anorexia  - EGD with gastritis, started on protonix/carafate  - Regular diet, as tolerated  - DVT ppx w/ Lovenox  - Discharge planning    D Team Surgery #[id number]

## 2019-12-13 NOTE — DISCHARGE NOTE PROVIDER - CARE PROVIDER_API CALL
Daren West)  Gastroenterology  [address] [facility], [address]  [place] [address]  Phone: [phone]  Fax: [phone]  Follow Up Time: 1 week    Damion Mauro)  ColonRectal Surgery; Surgery  [address] [place] [address]  Phone: [phone]  Fax: [phone]  Follow Up Time: Routine

## 2019-12-13 NOTE — DISCHARGE NOTE PROVIDER - CARE PROVIDERS DIRECT ADDRESSES
,DirectAddress_Unknown,[email or web address]

## 2019-12-13 NOTE — DISCHARGE NOTE PROVIDER - PROVIDER TOKENS
PROVIDER:[TOKEN:[[id number]],FOLLOWUP:[1 week]],PROVIDER:[TOKEN:[[id number]],FOLLOWUP:[Routine]]

## 2019-12-13 NOTE — PROGRESS NOTE ADULT - SUBJECTIVE AND OBJECTIVE BOX
D TEAM SURGERY PROGRESS NOTE    SUBJECTIVE: Patient seen and examined at bedside on AM rounds, no pain overnight though she reports the pain is predictable at 10:40AM. EGD revealed gastritis and she was started on protonix/carafate. NSAIDs were discontinued. Able to tolerate regular diet. Denies chest pain/SOB. Denies nausea/vomiting.    Vital Signs Last 24 Hrs  T(C): 36.7 (13 Dec 2019 05:23), Max: 36.8 (12 Dec 2019 09:34)  T(F): 98 (13 Dec 2019 05:23), Max: 98.3 (13 Dec 2019 00:03)  HR: 61 (13 Dec 2019 05:23) (54 - 95)  BP: 105/68 (13 Dec 2019 05:23) (105/68 - 155/93)  BP(mean): --  RR: 16 (13 Dec 2019 05:23) (11 - 18)  SpO2: 97% (13 Dec 2019 05:23) (97% - 100%)  I&O's Detail    12 Dec 2019 07:01  -  13 Dec 2019 07:00  --------------------------------------------------------  IN:  Total IN: 0 mL    OUT:    Voided: 900 mL  Total OUT: 900 mL    Total NET: -900 mL    MEDICATIONS  (STANDING):  enoxaparin Injectable 30 milliGRAM(s) SubCutaneous at bedtime  gabapentin 100 milliGRAM(s) Oral two times a day  melatonin 3 milliGRAM(s) Oral at bedtime  pantoprazole    Tablet 40 milliGRAM(s) Oral before breakfast  sucralfate 1 Gram(s) Oral four times a day    MEDICATIONS  (PRN):  acetaminophen   Tablet .. 650 milliGRAM(s) Oral every 6 hours PRN Mild Pain (1 - 3), Moderate Pain (4 - 6)      Physical Exam  General: A&Ox3, NAD  Respiratory: Clear bilaterally, equal bilateral expansion  Cardiovascular: Regular rate & rhythm  Abdominal: Soft, NT/ND    LABS:                        12.2   3.67  )-----------( 180      ( 13 Dec 2019 05:43 )             36.9     12-13    136  |  100  |  14  ----------------------------<  83  3.6   |  24  |  0.78    Ca    9.1      13 Dec 2019 05:43  Phos  3.1     12-13  Mg     2.1     12-13    TPro  6.5  /  Alb  3.6  /  TBili  0.5  /  DBili  x   /  AST  19  /  ALT  8   /  AlkPhos  57  12-13

## 2019-12-13 NOTE — DISCHARGE NOTE PROVIDER - NSDCCPCAREPLAN_GEN_ALL_CORE_FT
PRINCIPAL DISCHARGE DIAGNOSIS  Diagnosis: Gastritis  Assessment and Plan of Treatment: PAIN CONTROL: Do NOT take NSAIDS to control your pain. These include aspirin (Bren, Excedrin), ibuprofen (Advil, Motrin), naproxen (Aleve).  ACTIVITY: You may return to your usual level of physical activity.   DIET: Soft, low fiber, low residue diet.  NOTIFY YOUR DOCTOR IF YOU HAVE: any bleeding that does not stop,  any fever (over 100.4 F) persistent nausea/vomiting, if your pain is not controlled on your discharge pain medications, or if you are unable to urinate.  Please follow up with your primary care physician in one week regarding your hospitalization, bring copies of your discharge paperwork.  Please follow up with your surgeon, Dr. Mauro. Call to make an appointment.  Please follow up with GASTROENTEROLOGY (Dr. Daren West) regarding your diagnosis of gastritis and your new medications. Call [phone] to make an appointment.

## 2019-12-13 NOTE — DISCHARGE NOTE PROVIDER - NSDCMRMEDTOKEN_GEN_ALL_CORE_FT
acetaminophen 325 mg oral tablet: 2 tab(s) orally every 6 hours, As Needed -for severe pain   gabapentin 100 mg oral capsule: 1 cap(s) orally 2 times a day MDD:200mg  pantoprazole 40 mg oral delayed release tablet: 1 tab(s) orally once a day (before a meal) MDD:40mg  sucralfate 1 g oral tablet: 1 tab(s) orally 4 times a day MDD:4g acetaminophen 325 mg oral tablet: 2 tab(s) orally every 6 hours, As Needed -for severe pain   bisacodyl 10 mg rectal suppository: 1 suppository(ies) rectal once a day  gabapentin 100 mg oral capsule: 1 cap(s) orally 2 times a day MDD:200mg  pantoprazole 40 mg oral delayed release tablet: 1 tab(s) orally once a day (before a meal) MDD:40mg  polyethylene glycol 3350 oral powder for reconstitution: 17 gram(s) orally once a day  senna oral tablet: 2 tab(s) orally once a day (at bedtime)  sucralfate 1 g oral tablet: 1 tab(s) orally 4 times a day MDD:4g

## 2019-12-13 NOTE — DISCHARGE NOTE PROVIDER - HOSPITAL COURSE
This patient is a 62F with h/o pancreatic ca s/p whipple procedure in 2011 with complication of incisional hernia repaired May 2017 c/b enterotomy necessitating RNY Ade-ade, depression, anxiety (now off meds), anorexia,  bells palsy, who presented to the ED on 12/10/19 c/o abdominal pain for the past 2 years that has been getting progressively worse over the past two months. Pt was seen by her G// (Dr. West) for routine follow up and pt was sent in to be admitted for further workup. Pt has been able to tolerate diet, denies N/V, bowel movement at baseline, passing gas. Denies fevers, chills, CP, SOB.         Patient was admitted to surgical oncology service under the care of Dr. Mauro. Patient had a CT abdomen and pelvis which revealed Postoperative changes status post Whipple. No evidence of acute abdominal or pelvic abnormality.        Patient was evaluated by gastroenterology who suggested her abdominal pain may be due to peptic ulcer disease or erosive gastritis. Patient went for upper endoscopy which revealed mild nonbleeding erosion/ulceration and gastritis. Biopsy taken for H. pylori. Recommended to AVOID NSAIDS for pain control, resume soft, low fiber, low residue diet with frequent small meals, and to follow up with Dr. Daren West ([phone]) as     outpatient for continuation of GI care.            Patient's diet was restarted and advanced as tolerated. At this time, patient is currently ambulating, voiding, tolerating a regular diet. Pain well controlled on PO pain meds. Patient has been deemed stable for discharge home with follow up as an outpatient. This patient is a 62F with h/o pancreatic ca s/p whipple procedure in 2011 with complication of incisional hernia repaired May 2017 c/b enterotomy necessitating RNY Jeharis-woodyj, depression, anxiety (now off meds), anorexia,  bells palsy, who presented to the ED on 12/10/19 c/o abdominal pain for the past 2 years that has been getting progressively worse over the past two months. Pt was seen by her G// (Dr. West) for routine follow up and pt was sent in to be admitted for further workup. Pt has been able to tolerate diet, denies N/V, bowel movement at baseline, passing gas. Denies fevers, chills, CP, SOB.         Patient was admitted to surgical oncology service under the care of Dr. Mauro. Patient had a CT abdomen and pelvis which revealed Postoperative changes status post Whipple. No evidence of acute abdominal or pelvic abnormality.        Patient was evaluated by gastroenterology who suggested her abdominal pain may be due to peptic ulcer disease or erosive gastritis. Patient went for upper endoscopy which revealed mild nonbleeding erosion/ulceration and gastritis. Biopsy taken for H. pylori. Recommended to AVOID NSAIDS for pain control, resume soft, low fiber, low residue diet with frequent small meals, and to follow up with Dr. Daren West ([phone]) as     outpatient for continuation of GI care.        Chronic pain consulted and assisted with pain medication regimen.         Patient's diet was restarted and advanced as tolerated.         Per Attending pt now stable for discharge home. Pt tolerating diet , + bowel function, and pain well controlled on PO pain meds. Patient to follow up with Dr Mauro and her gastroenterologist Dr West as an outpatient, instructed to call to schedule appointment This patient is a 62F with h/o pancreatic ca s/p whipple procedure in 2011 with complication of incisional hernia repaired May 2017 c/b enterotomy necessitating RNY Jeharis-woodyj, depression, anxiety (now off meds), anorexia,  bells palsy, who presented to the ED on 12/10/19 c/o abdominal pain for the past 2 years that has been getting progressively worse over the past two months. Pt was seen by her G// (Dr. West) for routine follow up and pt was sent in to be admitted for further workup. Pt has been able to tolerate diet, denies N/V, bowel movement at baseline, passing gas. Denies fevers, chills, CP, SOB.         Patient was admitted to surgical oncology service under the care of Dr. Mauro. Patient had a CT abdomen and pelvis which revealed Postoperative changes status post Whipple. No evidence of acute abdominal or pelvic abnormality.        Patient was evaluated by gastroenterology who suggested her abdominal pain may be due to peptic ulcer disease or erosive gastritis. Patient went for upper endoscopy which revealed mild nonbleeding erosion/ulceration and gastritis. Biopsy taken for H. pylori. Recommended to AVOID NSAIDS for pain control, resume soft, low fiber, low residue diet with frequent small meals, and to follow up with Dr. Daren West ([phone]) as outpatient for continuation of GI care.        Chronic pain consulted and assisted with pain medication regimen.         Patient's diet was restarted and advanced as tolerated.         Per Attending pt now stable for discharge home. Pt tolerating diet , + bowel function, and pain well controlled on PO pain meds. Patient to follow up with Dr Mauro and her gastroenterologist Dr West as an outpatient, instructed to call to schedule appointment

## 2019-12-14 LAB
ANION GAP SERPL CALC-SCNC: 12 MMO/L — SIGNIFICANT CHANGE UP (ref 7–14)
BUN SERPL-MCNC: 13 MG/DL — SIGNIFICANT CHANGE UP (ref 7–23)
CALCIUM SERPL-MCNC: 8.8 MG/DL — SIGNIFICANT CHANGE UP (ref 8.4–10.5)
CHLORIDE SERPL-SCNC: 103 MMOL/L — SIGNIFICANT CHANGE UP (ref 98–107)
CO2 SERPL-SCNC: 25 MMOL/L — SIGNIFICANT CHANGE UP (ref 22–31)
CREAT SERPL-MCNC: 0.73 MG/DL — SIGNIFICANT CHANGE UP (ref 0.5–1.3)
GLUCOSE SERPL-MCNC: 91 MG/DL — SIGNIFICANT CHANGE UP (ref 70–99)
HCT VFR BLD CALC: 36.9 % — SIGNIFICANT CHANGE UP (ref 34.5–45)
HGB BLD-MCNC: 11.8 G/DL — SIGNIFICANT CHANGE UP (ref 11.5–15.5)
MAGNESIUM SERPL-MCNC: 2.1 MG/DL — SIGNIFICANT CHANGE UP (ref 1.6–2.6)
MCHC RBC-ENTMCNC: 29.4 PG — SIGNIFICANT CHANGE UP (ref 27–34)
MCHC RBC-ENTMCNC: 32 % — SIGNIFICANT CHANGE UP (ref 32–36)
MCV RBC AUTO: 92 FL — SIGNIFICANT CHANGE UP (ref 80–100)
NRBC # FLD: 0 K/UL — SIGNIFICANT CHANGE UP (ref 0–0)
PHOSPHATE SERPL-MCNC: 2.7 MG/DL — SIGNIFICANT CHANGE UP (ref 2.5–4.5)
PLATELET # BLD AUTO: 176 K/UL — SIGNIFICANT CHANGE UP (ref 150–400)
PMV BLD: 10.8 FL — SIGNIFICANT CHANGE UP (ref 7–13)
POTASSIUM SERPL-MCNC: 3.4 MMOL/L — LOW (ref 3.5–5.3)
POTASSIUM SERPL-SCNC: 3.4 MMOL/L — LOW (ref 3.5–5.3)
RBC # BLD: 4.01 M/UL — SIGNIFICANT CHANGE UP (ref 3.8–5.2)
RBC # FLD: 12.8 % — SIGNIFICANT CHANGE UP (ref 10.3–14.5)
SODIUM SERPL-SCNC: 140 MMOL/L — SIGNIFICANT CHANGE UP (ref 135–145)
WBC # BLD: 2.91 K/UL — LOW (ref 3.8–10.5)
WBC # FLD AUTO: 2.91 K/UL — LOW (ref 3.8–10.5)

## 2019-12-14 PROCEDURE — 99232 SBSQ HOSP IP/OBS MODERATE 35: CPT

## 2019-12-14 RX ORDER — POTASSIUM PHOSPHATE, MONOBASIC POTASSIUM PHOSPHATE, DIBASIC 236; 224 MG/ML; MG/ML
15 INJECTION, SOLUTION INTRAVENOUS ONCE
Refills: 0 | Status: COMPLETED | OUTPATIENT
Start: 2019-12-14 | End: 2019-12-14

## 2019-12-14 RX ORDER — SENNA PLUS 8.6 MG/1
2 TABLET ORAL AT BEDTIME
Refills: 0 | Status: DISCONTINUED | OUTPATIENT
Start: 2019-12-14 | End: 2019-12-20

## 2019-12-14 RX ORDER — POLYETHYLENE GLYCOL 3350 17 G/17G
17 POWDER, FOR SOLUTION ORAL DAILY
Refills: 0 | Status: DISCONTINUED | OUTPATIENT
Start: 2019-12-14 | End: 2019-12-20

## 2019-12-14 RX ORDER — POTASSIUM CHLORIDE 20 MEQ
20 PACKET (EA) ORAL
Refills: 0 | Status: COMPLETED | OUTPATIENT
Start: 2019-12-14 | End: 2019-12-14

## 2019-12-14 RX ADMIN — Medication 650 MILLIGRAM(S): at 15:15

## 2019-12-14 RX ADMIN — Medication 1 GRAM(S): at 12:34

## 2019-12-14 RX ADMIN — Medication 1 GRAM(S): at 05:56

## 2019-12-14 RX ADMIN — Medication 1 GRAM(S): at 00:11

## 2019-12-14 RX ADMIN — LIDOCAINE 1 PATCH: 4 CREAM TOPICAL at 23:40

## 2019-12-14 RX ADMIN — MORPHINE SULFATE 1 MILLIGRAM(S): 50 CAPSULE, EXTENDED RELEASE ORAL at 10:30

## 2019-12-14 RX ADMIN — LIDOCAINE 1 PATCH: 4 CREAM TOPICAL at 19:25

## 2019-12-14 RX ADMIN — Medication 1 GRAM(S): at 17:39

## 2019-12-14 RX ADMIN — Medication 20 MILLIEQUIVALENT(S): at 12:34

## 2019-12-14 RX ADMIN — Medication 20 MILLIEQUIVALENT(S): at 14:43

## 2019-12-14 RX ADMIN — Medication 1 GRAM(S): at 23:39

## 2019-12-14 RX ADMIN — GABAPENTIN 100 MILLIGRAM(S): 400 CAPSULE ORAL at 17:39

## 2019-12-14 RX ADMIN — SENNA PLUS 2 TABLET(S): 8.6 TABLET ORAL at 21:29

## 2019-12-14 RX ADMIN — ENOXAPARIN SODIUM 30 MILLIGRAM(S): 100 INJECTION SUBCUTANEOUS at 21:30

## 2019-12-14 RX ADMIN — LIDOCAINE 1 PATCH: 4 CREAM TOPICAL at 12:34

## 2019-12-14 RX ADMIN — POTASSIUM PHOSPHATE, MONOBASIC POTASSIUM PHOSPHATE, DIBASIC 62.5 MILLIMOLE(S): 236; 224 INJECTION, SOLUTION INTRAVENOUS at 12:35

## 2019-12-14 RX ADMIN — MORPHINE SULFATE 1 MILLIGRAM(S): 50 CAPSULE, EXTENDED RELEASE ORAL at 10:15

## 2019-12-14 RX ADMIN — Medication 650 MILLIGRAM(S): at 14:44

## 2019-12-14 RX ADMIN — GABAPENTIN 100 MILLIGRAM(S): 400 CAPSULE ORAL at 05:56

## 2019-12-14 RX ADMIN — PANTOPRAZOLE SODIUM 40 MILLIGRAM(S): 20 TABLET, DELAYED RELEASE ORAL at 08:03

## 2019-12-14 NOTE — PROGRESS NOTE ADULT - SUBJECTIVE AND OBJECTIVE BOX
Interval Events:    Nausea and vomiting yesterday AM.  Improved with NPO.  Xray demonstrating retained contrast from CT scan 3 days prior.    S: Patient doing well, denies fevers, chills, nausea, emesis, chest pain, SOB.  Pain is well controlled. -/- F/BM.    O: Vital Signs Last 24 Hrs  T(C): 36.6 (14 Dec 2019 08:05), Max: 36.8 (13 Dec 2019 19:57)  T(F): 97.8 (14 Dec 2019 08:05), Max: 98.3 (13 Dec 2019 19:57)  HR: 70 (14 Dec 2019 08:05) (57 - 76)  BP: 125/71 (14 Dec 2019 08:05) (106/66 - 126/80)  BP(mean): --  RR: 18 (14 Dec 2019 08:05) (16 - 18)  SpO2: 97% (14 Dec 2019 08:05) (97% - 100%)      13 Dec 2019 07:01  -  14 Dec 2019 07:00  --------------------------------------------------------  IN:    dextrose 5% + sodium chloride 0.45%.: 900 mL    Oral Fluid: 240 mL  Total IN: 1140 mL    OUT:    Voided: 600 mL  Total OUT: 600 mL    Total NET: 540 mL      14 Dec 2019 07:01  -  14 Dec 2019 11:47  --------------------------------------------------------  IN:    dextrose 5% + sodium chloride 0.45%.: 150 mL  Total IN: 150 mL    OUT:    Voided: 300 mL  Total OUT: 300 mL    Total NET: -150 mL          Physical Exam:    General: A&Ox3, NAD  Respiratory: Clear bilaterally, equal bilateral expansion  Cardiovascular: Regular rate & rhythm  Abdominal: Soft, NT/ND    Labs:                        11.8   2.91  )-----------( 176      ( 14 Dec 2019 06:45 )             36.9     14 Dec 2019 06:45    140    |  103    |  13     ----------------------------<  91     3.4     |  25     |  0.73     Ca    8.8        14 Dec 2019 06:45  Phos  2.7       14 Dec 2019 06:45  Mg     2.1       14 Dec 2019 06:45    TPro  6.5    /  Alb  3.6    /  TBili  0.5    /  DBili  x      /  AST  19     /  ALT  8      /  AlkPhos  57     13 Dec 2019 05:43      CAPILLARY BLOOD GLUCOSE            LIVER FUNCTIONS - ( 13 Dec 2019 05:43 )  Alb: 3.6 g/dL / Pro: 6.5 g/dL / ALK PHOS: 57 u/L / ALT: 8 u/L / AST: 19 u/L / GGT: x                 MEDICATIONS  (STANDING):  dextrose 5% + sodium chloride 0.45%. 1000 milliLiter(s) (75 mL/Hr) IV Continuous <Continuous>  enoxaparin Injectable 30 milliGRAM(s) SubCutaneous at bedtime  gabapentin 100 milliGRAM(s) Oral two times a day  lidocaine   Patch 1 Patch Transdermal daily  melatonin 3 milliGRAM(s) Oral at bedtime  pantoprazole    Tablet 40 milliGRAM(s) Oral before breakfast  sucralfate 1 Gram(s) Oral four times a day    MEDICATIONS  (PRN):  acetaminophen   Tablet .. 650 milliGRAM(s) Oral every 6 hours PRN Mild Pain (1 - 3), Moderate Pain (4 - 6)  morphine  - Injectable 1 milliGRAM(s) IV Push every 4 hours PRN Severe Pain (7 - 10) Interval Events:    Nausea and vomiting yesterday AM.  Improved with NPO.  Xray demonstrating retained contrast from CT scan 3 days prior.    S: Patient doing well, denies fevers, chills, nausea, emesis, chest pain, SOB.  Pain is well controlled. -/- F/BM.    O: Vital Signs Last 24 Hrs      T(C): 36.6 (14 Dec 2019 08:05), Max: 36.8 (13 Dec 2019 19:57)  T(F): 97.8 (14 Dec 2019 08:05), Max: 98.3 (13 Dec 2019 19:57)  HR: 70 (14 Dec 2019 08:05) (57 - 76)  BP: 125/71 (14 Dec 2019 08:05) (106/66 - 126/80)  BP(mean): --  RR: 18 (14 Dec 2019 08:05) (16 - 18)  SpO2: 97% (14 Dec 2019 08:05) (97% - 100%)      13 Dec 2019 07:01  -  14 Dec 2019 07:00  --------------------------------------------------------  IN:    dextrose 5% + sodium chloride 0.45%.: 900 mL    Oral Fluid: 240 mL  Total IN: 1140 mL    OUT:    Voided: 600 mL  Total OUT: 600 mL    Total NET: 540 mL      14 Dec 2019 07:01  -  14 Dec 2019 11:47  --------------------------------------------------------  IN:    dextrose 5% + sodium chloride 0.45%.: 150 mL  Total IN: 150 mL    OUT:    Voided: 300 mL  Total OUT: 300 mL    Total NET: -150 mL          Physical Exam:    General: A&Ox3, NAD  Respiratory: Clear bilaterally, equal bilateral expansion  Cardiovascular: Regular rate & rhythm  Abdominal: Soft, NT/ND    Labs:                        11.8   2.91  )-----------( 176      ( 14 Dec 2019 06:45 )             36.9     14 Dec 2019 06:45    140    |  103    |  13     ----------------------------<  91     3.4     |  25     |  0.73     Ca    8.8        14 Dec 2019 06:45  Phos  2.7       14 Dec 2019 06:45  Mg     2.1       14 Dec 2019 06:45    TPro  6.5    /  Alb  3.6    /  TBili  0.5    /  DBili  x      /  AST  19     /  ALT  8      /  AlkPhos  57     13 Dec 2019 05:43      CAPILLARY BLOOD GLUCOSE            LIVER FUNCTIONS - ( 13 Dec 2019 05:43 )  Alb: 3.6 g/dL / Pro: 6.5 g/dL / ALK PHOS: 57 u/L / ALT: 8 u/L / AST: 19 u/L / GGT: x                 MEDICATIONS  (STANDING):  dextrose 5% + sodium chloride 0.45%. 1000 milliLiter(s) (75 mL/Hr) IV Continuous <Continuous>  enoxaparin Injectable 30 milliGRAM(s) SubCutaneous at bedtime  gabapentin 100 milliGRAM(s) Oral two times a day  lidocaine   Patch 1 Patch Transdermal daily  melatonin 3 milliGRAM(s) Oral at bedtime  pantoprazole    Tablet 40 milliGRAM(s) Oral before breakfast  sucralfate 1 Gram(s) Oral four times a day    MEDICATIONS  (PRN):  acetaminophen   Tablet .. 650 milliGRAM(s) Oral every 6 hours PRN Mild Pain (1 - 3), Moderate Pain (4 - 6)  morphine  - Injectable 1 milliGRAM(s) IV Push every 4 hours PRN Severe Pain (7 - 10)

## 2019-12-14 NOTE — PROGRESS NOTE ADULT - SUBJECTIVE AND OBJECTIVE BOX
Chief Complaint:  Patient is a 64y old  Female who presents with a chief complaint of Chronic abdominal pain (14 Dec 2019 11:47)    Interval Events:   GI reconsulted for nausea/vomiting  Patient reports one episode of nausea/vomiting after lunch yesterday associated with some worsening of chronic abdominal pain.  No further episodes today  Tolerating CLD    Allergies:  codeine (Other; Fever)  sausage (Hives)  sertraline (Other)    Hospital Medications:  acetaminophen   Tablet .. 650 milliGRAM(s) Oral every 6 hours PRN  dextrose 5% + sodium chloride 0.45%. 1000 milliLiter(s) IV Continuous <Continuous>  enoxaparin Injectable 30 milliGRAM(s) SubCutaneous at bedtime  gabapentin 100 milliGRAM(s) Oral two times a day  lidocaine   Patch 1 Patch Transdermal daily  melatonin 3 milliGRAM(s) Oral at bedtime  morphine  - Injectable 1 milliGRAM(s) IV Push every 4 hours PRN  pantoprazole    Tablet 40 milliGRAM(s) Oral before breakfast  potassium chloride    Tablet ER 20 milliEquivalent(s) Oral every 2 hours  sucralfate 1 Gram(s) Oral four times a day    PMHX/PSHX:  Incisional hernia  Pancreatic cancer  FUO (Fever of Unknown Origin)  Cholelithiasis, Common Bile Duct  Depression  H/O hernia repair  Bell's palsy  H/O pancreatic cancer  S/P Breast Biopsy, Right  s/p hemorrhoidectomy-1994  s/p lap cholecystectomy- 1990    ROS:   General:  No fevers, chills or night sweats  ENT:  No sore throat or dysphagia  CV:  No pain or palpitations  Resp:  No dyspnea, cough or  wheezing  GI:  + pain, No nausea, No vomiting, No diarrhea, , No rectal bleeding, No tarry stools,  Skin:  No rash or edema    PHYSICAL EXAM:   Vital Signs:  Vital Signs Last 24 Hrs  T(C): 36.4 (14 Dec 2019 12:32), Max: 36.8 (13 Dec 2019 19:57)  T(F): 97.5 (14 Dec 2019 12:32), Max: 98.3 (13 Dec 2019 19:57)  HR: 60 (14 Dec 2019 12:32) (57 - 76)  BP: 136/69 (14 Dec 2019 12:32) (106/66 - 136/69)  BP(mean): --  RR: 16 (14 Dec 2019 12:32) (16 - 18)  SpO2: 100% (14 Dec 2019 12:32) (97% - 100%)  Daily     Daily     GENERAL:  NAD, Appears stated age  HEENT:  NC/AT,  conjunctivae clear and pink, sclera -anicteric  CHEST:  Normal Effort,  HEART:  RRR, S1 + S  ABDOMEN:  Soft, non-tender, non-distended, BS+  EXTEREMITIES:  no cyanosis  SKIN:  Warm & Dry  NEURO:  Alert, oriented    LABS:                        11.8   2.91  )-----------( 176      ( 14 Dec 2019 06:45 )             36.9     Mean Cell Volume: 92.0 fL (12-14-19 @ 06:45)    12-14    140  |  103  |  13  ----------------------------<  91  3.4<L>   |  25  |  0.73    Ca    8.8      14 Dec 2019 06:45  Phos  2.7     12-14  Mg     2.1     12-14    TPro  6.5  /  Alb  3.6  /  TBili  0.5  /  DBili  x   /  AST  19  /  ALT  8   /  AlkPhos  57  12-13    LIVER FUNCTIONS - ( 13 Dec 2019 05:43 )  Alb: 3.6 g/dL / Pro: 6.5 g/dL / ALK PHOS: 57 u/L / ALT: 8 u/L / AST: 19 u/L / GGT: x                                       11.8   2.91  )-----------( 176      ( 14 Dec 2019 06:45 )             36.9                         12.2   3.67  )-----------( 180      ( 13 Dec 2019 05:43 )             36.9                         12.2   3.39  )-----------( 169      ( 12 Dec 2019 06:00 )             37.4       Imaging:

## 2019-12-14 NOTE — PROGRESS NOTE ADULT - ASSESSMENT
62F with h/o pancreatic ca s/p whipple procedure in 2011 with complication of incisional hernia repaired May 2017, depression, anxiety (now off meds), anorexia,  bells palsy present to the ED c/o abdominal pain for the past 2 years that has been getting progressively worse.     - Medicine f/u regarding anorexia  - Gastroenterology reconsult today  - EGD with gastritis, started on protonix/carafate  - Clears today  - DVT ppx w/ Lovenox  - Discharge planning    D Team Surgery #[id number]

## 2019-12-14 NOTE — PROGRESS NOTE ADULT - ASSESSMENT
62F w/ hx of pancreatic cancer s/p whipple and now s/p Calin-En-Y with Billroth II anatomy presents with acute on chronic epigastric pain. Patient sent in by outpatient GI for EGD. EGD done 12/12/19 showing patent gastrojejunostomy with mild nonbleeding erosion/ulceration. GI reconsulted for nausea / vomiting x 1 and retained contrast in colon    Impression:  # Epigastric pain: Differential includes pain from ulcerations seen on recent EGD versus functional pain from multiple surgeries  # Nausea/Vomiting: One episode, patient now reports she is tolerating clear liquid diet. Retained contrast likely related to constipation; patient reports no bowel movement x 5 days, but able to pass flatus.   # Pancreatic cancer S/p whipple    Recommendations:  - Avoid narcotics if possible as may slow gut motility & worsen constipation  - Diet as tolerated  - Please start bowel regimen (senna, Miralax)  - Supportive care per primary team    Emily Gallardo MD  Gastroenterology Fellow  [phone]  Please page on call fellow on weekends and after 5pm on weekdays 62F w/ hx of pancreatic cancer s/p whipple and now s/p Calin-En-Y with Billroth II anatomy presents with acute on chronic epigastric pain. Patient sent in by outpatient GI for EGD. EGD done 12/12/19 showing patent gastrojejunostomy with mild nonbleeding erosion/ulceration. GI reconsulted for nausea / vomiting x 1 and retained contrast in colon    Impression:  # Epigastric pain: Differential includes pain from ulcerations seen on recent EGD versus functional pain from multiple surgeries  # Nausea/Vomiting: One episode, patient now reports she is tolerating clear liquid diet. Retained contrast likely related to constipation; patient reports no bowel movement x 5 days, but able to pass flatus.   # Pancreatic cancer S/p whipple    Recommendations:  - Avoid narcotics if possible as may slow gut motility & worsen constipation  - Diet as tolerated  - Continue PPI  - OK to continue carafate  - Please start bowel regimen (senna, Miralax)  - Supportive care per primary team    Emily Gallardo MD  Gastroenterology Fellow  [phone]  Please page on call fellow on weekends and after 5pm on weekdays

## 2019-12-15 LAB
ANION GAP SERPL CALC-SCNC: 9 MMO/L — SIGNIFICANT CHANGE UP (ref 7–14)
BUN SERPL-MCNC: 7 MG/DL — SIGNIFICANT CHANGE UP (ref 7–23)
CALCIUM SERPL-MCNC: 9 MG/DL — SIGNIFICANT CHANGE UP (ref 8.4–10.5)
CHLORIDE SERPL-SCNC: 103 MMOL/L — SIGNIFICANT CHANGE UP (ref 98–107)
CO2 SERPL-SCNC: 23 MMOL/L — SIGNIFICANT CHANGE UP (ref 22–31)
CREAT SERPL-MCNC: 0.69 MG/DL — SIGNIFICANT CHANGE UP (ref 0.5–1.3)
GLUCOSE SERPL-MCNC: 99 MG/DL — SIGNIFICANT CHANGE UP (ref 70–99)
HCT VFR BLD CALC: 35.6 % — SIGNIFICANT CHANGE UP (ref 34.5–45)
HGB BLD-MCNC: 11.7 G/DL — SIGNIFICANT CHANGE UP (ref 11.5–15.5)
MAGNESIUM SERPL-MCNC: 2 MG/DL — SIGNIFICANT CHANGE UP (ref 1.6–2.6)
MCHC RBC-ENTMCNC: 30.3 PG — SIGNIFICANT CHANGE UP (ref 27–34)
MCHC RBC-ENTMCNC: 32.9 % — SIGNIFICANT CHANGE UP (ref 32–36)
MCV RBC AUTO: 92.2 FL — SIGNIFICANT CHANGE UP (ref 80–100)
NRBC # FLD: 0 K/UL — SIGNIFICANT CHANGE UP (ref 0–0)
PHOSPHATE SERPL-MCNC: 2.9 MG/DL — SIGNIFICANT CHANGE UP (ref 2.5–4.5)
PLATELET # BLD AUTO: 162 K/UL — SIGNIFICANT CHANGE UP (ref 150–400)
PMV BLD: 10.6 FL — SIGNIFICANT CHANGE UP (ref 7–13)
POTASSIUM SERPL-MCNC: 3.4 MMOL/L — LOW (ref 3.5–5.3)
POTASSIUM SERPL-SCNC: 3.4 MMOL/L — LOW (ref 3.5–5.3)
RBC # BLD: 3.86 M/UL — SIGNIFICANT CHANGE UP (ref 3.8–5.2)
RBC # FLD: 12.9 % — SIGNIFICANT CHANGE UP (ref 10.3–14.5)
SODIUM SERPL-SCNC: 135 MMOL/L — SIGNIFICANT CHANGE UP (ref 135–145)
WBC # BLD: 2.57 K/UL — LOW (ref 3.8–10.5)
WBC # FLD AUTO: 2.57 K/UL — LOW (ref 3.8–10.5)

## 2019-12-15 PROCEDURE — 99232 SBSQ HOSP IP/OBS MODERATE 35: CPT | Mod: GC

## 2019-12-15 PROCEDURE — 99232 SBSQ HOSP IP/OBS MODERATE 35: CPT

## 2019-12-15 RX ORDER — POTASSIUM CHLORIDE 20 MEQ
20 PACKET (EA) ORAL
Refills: 0 | Status: COMPLETED | OUTPATIENT
Start: 2019-12-15 | End: 2019-12-15

## 2019-12-15 RX ADMIN — LIDOCAINE 1 PATCH: 4 CREAM TOPICAL at 23:30

## 2019-12-15 RX ADMIN — PANTOPRAZOLE SODIUM 40 MILLIGRAM(S): 20 TABLET, DELAYED RELEASE ORAL at 05:02

## 2019-12-15 RX ADMIN — Medication 20 MILLIEQUIVALENT(S): at 13:41

## 2019-12-15 RX ADMIN — Medication 1 GRAM(S): at 11:30

## 2019-12-15 RX ADMIN — LIDOCAINE 1 PATCH: 4 CREAM TOPICAL at 19:33

## 2019-12-15 RX ADMIN — SENNA PLUS 2 TABLET(S): 8.6 TABLET ORAL at 21:05

## 2019-12-15 RX ADMIN — Medication 1 GRAM(S): at 05:02

## 2019-12-15 RX ADMIN — POLYETHYLENE GLYCOL 3350 17 GRAM(S): 17 POWDER, FOR SOLUTION ORAL at 11:30

## 2019-12-15 RX ADMIN — Medication 10 MILLIGRAM(S): at 08:08

## 2019-12-15 RX ADMIN — GABAPENTIN 100 MILLIGRAM(S): 400 CAPSULE ORAL at 05:02

## 2019-12-15 RX ADMIN — GABAPENTIN 100 MILLIGRAM(S): 400 CAPSULE ORAL at 17:41

## 2019-12-15 RX ADMIN — Medication 20 MILLIEQUIVALENT(S): at 15:29

## 2019-12-15 RX ADMIN — LIDOCAINE 1 PATCH: 4 CREAM TOPICAL at 11:30

## 2019-12-15 RX ADMIN — Medication 1 GRAM(S): at 17:42

## 2019-12-15 RX ADMIN — MORPHINE SULFATE 1 MILLIGRAM(S): 50 CAPSULE, EXTENDED RELEASE ORAL at 08:40

## 2019-12-15 RX ADMIN — SODIUM CHLORIDE 75 MILLILITER(S): 9 INJECTION, SOLUTION INTRAVENOUS at 08:13

## 2019-12-15 RX ADMIN — Medication 1 GRAM(S): at 23:07

## 2019-12-15 RX ADMIN — MORPHINE SULFATE 1 MILLIGRAM(S): 50 CAPSULE, EXTENDED RELEASE ORAL at 08:08

## 2019-12-15 RX ADMIN — Medication 20 MILLIEQUIVALENT(S): at 17:41

## 2019-12-15 RX ADMIN — ENOXAPARIN SODIUM 30 MILLIGRAM(S): 100 INJECTION SUBCUTANEOUS at 21:05

## 2019-12-15 NOTE — PROGRESS NOTE ADULT - SUBJECTIVE AND OBJECTIVE BOX
Interval Events:    No acute events overnight    S: Patient doing well, denies fevers, chills, nausea, emesis, chest pain, SOB.  C/o pain this am.  Toelrating PO  O: Vital Signs Last 24 Hrs  T(C): 36.8 (15 Dec 2019 08:12), Max: 36.8 (14 Dec 2019 19:45)  T(F): 98.2 (15 Dec 2019 08:12), Max: 98.2 (14 Dec 2019 19:45)  HR: 65 (15 Dec 2019 08:12) (56 - 65)  BP: 131/71 (15 Dec 2019 08:12) (115/69 - 144/78)  BP(mean): --  RR: 16 (15 Dec 2019 08:12) (16 - 18)  SpO2: 99% (15 Dec 2019 08:12) (97% - 100%)      14 Dec 2019 07:01  -  15 Dec 2019 07:00  --------------------------------------------------------  IN:    dextrose 5% + sodium chloride 0.45%.: 975 mL    Oral Fluid: 480 mL  Total IN: 1455 mL    OUT:    Voided: 1150 mL  Total OUT: 1150 mL    Total NET: 305 mL      15 Dec 2019 07:01  -  15 Dec 2019 10:02  --------------------------------------------------------  IN:    dextrose 5% + sodium chloride 0.45%.: 225 mL  Total IN: 225 mL    OUT:    Voided: 400 mL  Total OUT: 400 mL    Total NET: -175 mL          Physical Exam:    Gen: ill appearing in no acute distress  Resp: No additional work of breathing   GI: Soft, non-tender, non-distended, with normoactive bowel sounds.  No masses.  Well healed surgical scar.  MSK: Moves all extremities equally  Skin: No rashes    Labs:                        11.7   2.57  )-----------( 162      ( 15 Dec 2019 06:03 )             35.6     15 Dec 2019 06:03    135    |  103    |  7      ----------------------------<  99     3.4     |  23     |  0.69     Ca    9.0        15 Dec 2019 06:03  Phos  2.9       15 Dec 2019 06:03  Mg     2.0       15 Dec 2019 06:03        CAPILLARY BLOOD GLUCOSE                    MEDICATIONS  (STANDING):  bisacodyl Suppository 10 milliGRAM(s) Rectal daily  dextrose 5% + sodium chloride 0.45%. 1000 milliLiter(s) (75 mL/Hr) IV Continuous <Continuous>  enoxaparin Injectable 30 milliGRAM(s) SubCutaneous at bedtime  gabapentin 100 milliGRAM(s) Oral two times a day  lidocaine   Patch 1 Patch Transdermal daily  melatonin 3 milliGRAM(s) Oral at bedtime  pantoprazole    Tablet 40 milliGRAM(s) Oral before breakfast  polyethylene glycol 3350 17 Gram(s) Oral daily  senna 2 Tablet(s) Oral at bedtime  sucralfate 1 Gram(s) Oral four times a day    MEDICATIONS  (PRN):  acetaminophen   Tablet .. 650 milliGRAM(s) Oral every 6 hours PRN Mild Pain (1 - 3), Moderate Pain (4 - 6)  morphine  - Injectable 1 milliGRAM(s) IV Push every 4 hours PRN Severe Pain (7 - 10)

## 2019-12-15 NOTE — PROGRESS NOTE ADULT - ATTENDING COMMENTS
- I have seen and examined the patient on rounds. Patient's chart, labs, images and reports reviewed.  No acute events  Clears as leo  Abd soft  - I have discussed the diagnosis with the patient in detail. Patient expressed verbal understanding and willingness to proceed with proposed plan. All questions answered
GI reconsulted for nausea and vomiting,   Patient with chronic abdominal pain for 2 years. Discussed with patient that EGD with non-bleeding erosions and gastropathy, but no obvious source of chronic symptoms. Suspect likely functional etiology given workup, possibly exacerbated in setting of constipation.   Discussed that given chronicity of symptoms and results of workup, it is unlikely that there will be an immediate fix to her symptoms and this will likely require further outpatient management with her gastroenterologist, Dr. West.   Would continue supportive measures with PPI, carafate. Can consider initiation of TCA for possible functional pain (however this may worsen constipation and often takes several weeks before clinical improvement).   Workup of leukopenia per medical team
- I have seen and examined the patient on rounds. Patient's chart, labs, images and reports reviewed.  GI to see pt  Clears as leo  - I have discussed the diagnosis with the patient in detail. Patient expressed verbal understanding and willingness to proceed with proposed plan. All questions answered

## 2019-12-15 NOTE — PROGRESS NOTE ADULT - ASSESSMENT
62F with h/o pancreatic ca s/p whipple procedure in 2011 with complication of incisional hernia repaired May 2017, depression, anxiety (now off meds), anorexia,  bells palsy present to the ED c/o abdominal pain for the past 2 years that has been getting progressively worse.     - Medicine f/u regarding anorexia  - Gastroenterology reconsult today  - EGD with gastritis, started on protonix/carafate  - Clears today  - DVT ppx w/ Lovenox  - Discharge planning  - bowel regimen with suppository today    D Team Surgery #[id number]

## 2019-12-15 NOTE — CHART NOTE - NSCHARTNOTEFT_GEN_A_CORE
Patient seen today (12/15) with GI fellow. Please see cosign for 12/14 note for attending attestation.    Patient should follow up with her private gastroenterologist, Dr. West, after discharge.

## 2019-12-16 LAB
ANION GAP SERPL CALC-SCNC: 10 MMO/L — SIGNIFICANT CHANGE UP (ref 7–14)
BUN SERPL-MCNC: 6 MG/DL — LOW (ref 7–23)
CALCIUM SERPL-MCNC: 9.6 MG/DL — SIGNIFICANT CHANGE UP (ref 8.4–10.5)
CHLORIDE SERPL-SCNC: 102 MMOL/L — SIGNIFICANT CHANGE UP (ref 98–107)
CO2 SERPL-SCNC: 26 MMOL/L — SIGNIFICANT CHANGE UP (ref 22–31)
CREAT SERPL-MCNC: 0.79 MG/DL — SIGNIFICANT CHANGE UP (ref 0.5–1.3)
GLUCOSE SERPL-MCNC: 81 MG/DL — SIGNIFICANT CHANGE UP (ref 70–99)
HCT VFR BLD CALC: 37.6 % — SIGNIFICANT CHANGE UP (ref 34.5–45)
HGB BLD-MCNC: 12.6 G/DL — SIGNIFICANT CHANGE UP (ref 11.5–15.5)
MAGNESIUM SERPL-MCNC: 2 MG/DL — SIGNIFICANT CHANGE UP (ref 1.6–2.6)
MCHC RBC-ENTMCNC: 30.3 PG — SIGNIFICANT CHANGE UP (ref 27–34)
MCHC RBC-ENTMCNC: 33.5 % — SIGNIFICANT CHANGE UP (ref 32–36)
MCV RBC AUTO: 90.4 FL — SIGNIFICANT CHANGE UP (ref 80–100)
NRBC # FLD: 0 K/UL — SIGNIFICANT CHANGE UP (ref 0–0)
PHOSPHATE SERPL-MCNC: 3.1 MG/DL — SIGNIFICANT CHANGE UP (ref 2.5–4.5)
PLATELET # BLD AUTO: 176 K/UL — SIGNIFICANT CHANGE UP (ref 150–400)
PMV BLD: 10.6 FL — SIGNIFICANT CHANGE UP (ref 7–13)
POTASSIUM SERPL-MCNC: 4.1 MMOL/L — SIGNIFICANT CHANGE UP (ref 3.5–5.3)
POTASSIUM SERPL-SCNC: 4.1 MMOL/L — SIGNIFICANT CHANGE UP (ref 3.5–5.3)
RBC # BLD: 4.16 M/UL — SIGNIFICANT CHANGE UP (ref 3.8–5.2)
RBC # FLD: 12.7 % — SIGNIFICANT CHANGE UP (ref 10.3–14.5)
SODIUM SERPL-SCNC: 138 MMOL/L — SIGNIFICANT CHANGE UP (ref 135–145)
WBC # BLD: 2.87 K/UL — LOW (ref 3.8–10.5)
WBC # FLD AUTO: 2.87 K/UL — LOW (ref 3.8–10.5)

## 2019-12-16 PROCEDURE — 93010 ELECTROCARDIOGRAM REPORT: CPT

## 2019-12-16 RX ORDER — GABAPENTIN 400 MG/1
200 CAPSULE ORAL THREE TIMES A DAY
Refills: 0 | Status: DISCONTINUED | OUTPATIENT
Start: 2019-12-16 | End: 2019-12-20

## 2019-12-16 RX ORDER — TIZANIDINE 4 MG/1
1 TABLET ORAL EVERY 6 HOURS
Refills: 0 | Status: DISCONTINUED | OUTPATIENT
Start: 2019-12-16 | End: 2019-12-17

## 2019-12-16 RX ORDER — ACETAMINOPHEN 500 MG
650 TABLET ORAL EVERY 6 HOURS
Refills: 0 | Status: COMPLETED | OUTPATIENT
Start: 2019-12-16 | End: 2019-12-18

## 2019-12-16 RX ADMIN — PANTOPRAZOLE SODIUM 40 MILLIGRAM(S): 20 TABLET, DELAYED RELEASE ORAL at 05:30

## 2019-12-16 RX ADMIN — TIZANIDINE 1 MILLIGRAM(S): 4 TABLET ORAL at 23:08

## 2019-12-16 RX ADMIN — GABAPENTIN 200 MILLIGRAM(S): 400 CAPSULE ORAL at 23:08

## 2019-12-16 RX ADMIN — LIDOCAINE 1 PATCH: 4 CREAM TOPICAL at 23:09

## 2019-12-16 RX ADMIN — Medication 10 MILLIGRAM(S): at 09:48

## 2019-12-16 RX ADMIN — Medication 650 MILLIGRAM(S): at 23:37

## 2019-12-16 RX ADMIN — Medication 1 DROP(S): at 11:39

## 2019-12-16 RX ADMIN — GABAPENTIN 200 MILLIGRAM(S): 400 CAPSULE ORAL at 13:02

## 2019-12-16 RX ADMIN — TIZANIDINE 1 MILLIGRAM(S): 4 TABLET ORAL at 15:21

## 2019-12-16 RX ADMIN — LIDOCAINE 1 PATCH: 4 CREAM TOPICAL at 12:21

## 2019-12-16 RX ADMIN — Medication 1 GRAM(S): at 05:29

## 2019-12-16 RX ADMIN — Medication 650 MILLIGRAM(S): at 23:07

## 2019-12-16 RX ADMIN — LIDOCAINE 1 PATCH: 4 CREAM TOPICAL at 18:45

## 2019-12-16 RX ADMIN — SENNA PLUS 2 TABLET(S): 8.6 TABLET ORAL at 23:09

## 2019-12-16 RX ADMIN — ENOXAPARIN SODIUM 30 MILLIGRAM(S): 100 INJECTION SUBCUTANEOUS at 23:07

## 2019-12-16 RX ADMIN — Medication 650 MILLIGRAM(S): at 17:41

## 2019-12-16 RX ADMIN — GABAPENTIN 100 MILLIGRAM(S): 400 CAPSULE ORAL at 05:29

## 2019-12-16 RX ADMIN — Medication 1 GRAM(S): at 11:39

## 2019-12-16 RX ADMIN — Medication 650 MILLIGRAM(S): at 18:15

## 2019-12-16 RX ADMIN — Medication 650 MILLIGRAM(S): at 08:30

## 2019-12-16 RX ADMIN — Medication 1 GRAM(S): at 23:07

## 2019-12-16 RX ADMIN — Medication 1 GRAM(S): at 17:41

## 2019-12-16 RX ADMIN — Medication 650 MILLIGRAM(S): at 07:55

## 2019-12-16 NOTE — PROGRESS NOTE ADULT - ASSESSMENT
62F with h/o pancreatic ca s/p whipple procedure in 2011 with complication of incisional hernia repaired May 2017, depression, anxiety (now off meds), anorexia,  bells palsy present to the ED c/o abdominal pain for the past 2 years that has been getting progressively worse.     - Medicine f/u regarding anorexia  - Gastroenterology reconsult today  - EGD with gastritis, started on protonix/carafate  - Regular diet  - DVT ppx w/ Lovenox  - Discharge planning  - bowel regimen    D Team Surgery #[id number]

## 2019-12-16 NOTE — PROGRESS NOTE ADULT - SUBJECTIVE AND OBJECTIVE BOX
Interval Events:    No acute events overnight    S: Patient doing well, denies fevers, chills, nausea, emesis, chest pain, SOB.  Pain is well controlled. Tolerating PO w/o N/V.  +/+ F/BM.    O: Vital Signs Last 24 Hrs  T(C): 36.7 (15 Dec 2019 23:36), Max: 36.8 (15 Dec 2019 08:12)  T(F): 98.1 (15 Dec 2019 23:36), Max: 98.2 (15 Dec 2019 08:12)  HR: 50 (15 Dec 2019 23:36) (50 - 65)  BP: 127/67 (15 Dec 2019 23:36) (120/68 - 153/81)  BP(mean): --  RR: 17 (15 Dec 2019 23:36) (16 - 17)  SpO2: 100% (15 Dec 2019 23:36) (99% - 100%)      14 Dec 2019 07:01  -  15 Dec 2019 07:00  --------------------------------------------------------  IN:    dextrose 5% + sodium chloride 0.45%.: 975 mL    Oral Fluid: 480 mL  Total IN: 1455 mL    OUT:    Voided: 1150 mL  Total OUT: 1150 mL    Total NET: 305 mL      15 Dec 2019 07:01  -  16 Dec 2019 04:49  --------------------------------------------------------  IN:    dextrose 5% + sodium chloride 0.45%.: 675 mL  Total IN: 675 mL    OUT:    Voided: 1300 mL  Total OUT: 1300 mL    Total NET: -625 mL          Physical Exam:    Gen: ill appearing in no acute distress  Resp: No additional work of breathing   GI: Soft, non-tender, non-distended, with normoactive bowel sounds.  No masses.  Well healed surgical scar.  MSK: Moves all extremities equally  Skin: No rashes  Labs:                        11.7   2.57  )-----------( 162      ( 15 Dec 2019 06:03 )             35.6     15 Dec 2019 06:03    135    |  103    |  7      ----------------------------<  99     3.4     |  23     |  0.69     Ca    9.0        15 Dec 2019 06:03  Phos  2.9       15 Dec 2019 06:03  Mg     2.0       15 Dec 2019 06:03        CAPILLARY BLOOD GLUCOSE                    MEDICATIONS  (STANDING):  artificial tears (preservative free) Ophthalmic Solution 1 Drop(s) Both EYES daily  bisacodyl Suppository 10 milliGRAM(s) Rectal daily  enoxaparin Injectable 30 milliGRAM(s) SubCutaneous at bedtime  gabapentin 100 milliGRAM(s) Oral two times a day  lidocaine   Patch 1 Patch Transdermal daily  melatonin 3 milliGRAM(s) Oral at bedtime  pantoprazole    Tablet 40 milliGRAM(s) Oral before breakfast  polyethylene glycol 3350 17 Gram(s) Oral daily  senna 2 Tablet(s) Oral at bedtime  sucralfate 1 Gram(s) Oral four times a day    MEDICATIONS  (PRN):  acetaminophen   Tablet .. 650 milliGRAM(s) Oral every 6 hours PRN Mild Pain (1 - 3), Moderate Pain (4 - 6)  morphine  - Injectable 1 milliGRAM(s) IV Push every 4 hours PRN Severe Pain (7 - 10)

## 2019-12-17 LAB
ANION GAP SERPL CALC-SCNC: 11 MMO/L — SIGNIFICANT CHANGE UP (ref 7–14)
BUN SERPL-MCNC: 9 MG/DL — SIGNIFICANT CHANGE UP (ref 7–23)
CALCIUM SERPL-MCNC: 9.8 MG/DL — SIGNIFICANT CHANGE UP (ref 8.4–10.5)
CHLORIDE SERPL-SCNC: 97 MMOL/L — LOW (ref 98–107)
CO2 SERPL-SCNC: 27 MMOL/L — SIGNIFICANT CHANGE UP (ref 22–31)
CREAT SERPL-MCNC: 0.82 MG/DL — SIGNIFICANT CHANGE UP (ref 0.5–1.3)
GLUCOSE SERPL-MCNC: 76 MG/DL — SIGNIFICANT CHANGE UP (ref 70–99)
HCT VFR BLD CALC: 37.8 % — SIGNIFICANT CHANGE UP (ref 34.5–45)
HGB BLD-MCNC: 12.3 G/DL — SIGNIFICANT CHANGE UP (ref 11.5–15.5)
MAGNESIUM SERPL-MCNC: 2.1 MG/DL — SIGNIFICANT CHANGE UP (ref 1.6–2.6)
MCHC RBC-ENTMCNC: 29.6 PG — SIGNIFICANT CHANGE UP (ref 27–34)
MCHC RBC-ENTMCNC: 32.5 % — SIGNIFICANT CHANGE UP (ref 32–36)
MCV RBC AUTO: 91.1 FL — SIGNIFICANT CHANGE UP (ref 80–100)
NRBC # FLD: 0 K/UL — SIGNIFICANT CHANGE UP (ref 0–0)
PHOSPHATE SERPL-MCNC: 3.8 MG/DL — SIGNIFICANT CHANGE UP (ref 2.5–4.5)
PLATELET # BLD AUTO: 178 K/UL — SIGNIFICANT CHANGE UP (ref 150–400)
PMV BLD: 10.8 FL — SIGNIFICANT CHANGE UP (ref 7–13)
POTASSIUM SERPL-MCNC: 3.9 MMOL/L — SIGNIFICANT CHANGE UP (ref 3.5–5.3)
POTASSIUM SERPL-SCNC: 3.9 MMOL/L — SIGNIFICANT CHANGE UP (ref 3.5–5.3)
RBC # BLD: 4.15 M/UL — SIGNIFICANT CHANGE UP (ref 3.8–5.2)
RBC # FLD: 12.6 % — SIGNIFICANT CHANGE UP (ref 10.3–14.5)
SODIUM SERPL-SCNC: 135 MMOL/L — SIGNIFICANT CHANGE UP (ref 135–145)
WBC # BLD: 2.98 K/UL — LOW (ref 3.8–10.5)
WBC # FLD AUTO: 2.98 K/UL — LOW (ref 3.8–10.5)

## 2019-12-17 RX ORDER — TIZANIDINE 4 MG/1
2 TABLET ORAL EVERY 6 HOURS
Refills: 0 | Status: DISCONTINUED | OUTPATIENT
Start: 2019-12-17 | End: 2019-12-18

## 2019-12-17 RX ORDER — POTASSIUM CHLORIDE 20 MEQ
20 PACKET (EA) ORAL ONCE
Refills: 0 | Status: COMPLETED | OUTPATIENT
Start: 2019-12-17 | End: 2019-12-17

## 2019-12-17 RX ADMIN — PANTOPRAZOLE SODIUM 40 MILLIGRAM(S): 20 TABLET, DELAYED RELEASE ORAL at 05:14

## 2019-12-17 RX ADMIN — GABAPENTIN 200 MILLIGRAM(S): 400 CAPSULE ORAL at 13:08

## 2019-12-17 RX ADMIN — LIDOCAINE 1 PATCH: 4 CREAM TOPICAL at 19:16

## 2019-12-17 RX ADMIN — Medication 650 MILLIGRAM(S): at 12:35

## 2019-12-17 RX ADMIN — LIDOCAINE 1 PATCH: 4 CREAM TOPICAL at 17:21

## 2019-12-17 RX ADMIN — Medication 1 GRAM(S): at 12:32

## 2019-12-17 RX ADMIN — GABAPENTIN 200 MILLIGRAM(S): 400 CAPSULE ORAL at 21:05

## 2019-12-17 RX ADMIN — Medication 10 MILLIGRAM(S): at 12:33

## 2019-12-17 RX ADMIN — GABAPENTIN 200 MILLIGRAM(S): 400 CAPSULE ORAL at 05:15

## 2019-12-17 RX ADMIN — TIZANIDINE 2 MILLIGRAM(S): 4 TABLET ORAL at 17:21

## 2019-12-17 RX ADMIN — Medication 1 DROP(S): at 12:32

## 2019-12-17 RX ADMIN — Medication 650 MILLIGRAM(S): at 23:18

## 2019-12-17 RX ADMIN — TIZANIDINE 1 MILLIGRAM(S): 4 TABLET ORAL at 05:14

## 2019-12-17 RX ADMIN — Medication 650 MILLIGRAM(S): at 12:33

## 2019-12-17 RX ADMIN — TIZANIDINE 1 MILLIGRAM(S): 4 TABLET ORAL at 12:32

## 2019-12-17 RX ADMIN — Medication 650 MILLIGRAM(S): at 17:21

## 2019-12-17 RX ADMIN — Medication 650 MILLIGRAM(S): at 05:13

## 2019-12-17 RX ADMIN — Medication 650 MILLIGRAM(S): at 17:22

## 2019-12-17 RX ADMIN — Medication 1 GRAM(S): at 05:14

## 2019-12-17 RX ADMIN — ENOXAPARIN SODIUM 30 MILLIGRAM(S): 100 INJECTION SUBCUTANEOUS at 21:05

## 2019-12-17 RX ADMIN — Medication 1 GRAM(S): at 17:21

## 2019-12-17 RX ADMIN — TIZANIDINE 2 MILLIGRAM(S): 4 TABLET ORAL at 23:18

## 2019-12-17 RX ADMIN — Medication 1 GRAM(S): at 23:18

## 2019-12-17 RX ADMIN — Medication 650 MILLIGRAM(S): at 05:43

## 2019-12-17 RX ADMIN — Medication 20 MILLIEQUIVALENT(S): at 12:33

## 2019-12-17 NOTE — PROGRESS NOTE ADULT - ASSESSMENT
62F with h/o pancreatic ca s/p whipple procedure in 2011 with complication of incisional hernia repaired May 2017, depression, anxiety (now off meds), anorexia,  bells palsy present to the ED c/o abdominal pain for the past 2 years that has been getting progressively worse.   - Discharge today   - No acute intervention per GI   - EGD with gastritis, started on protonix/carafate  - Regular diet  - DVT ppx w/ Lovenox  - bowel regimen

## 2019-12-17 NOTE — PROGRESS NOTE ADULT - SUBJECTIVE AND OBJECTIVE BOX
Surgery Progress Note     Subjective/24hour Events:   Patient seen and examined.   Patient continues to endorse chronic abdominal pain however no acute surgical intervention identified     Vital Signs:  Vital Signs Last 24 Hrs  T(C): 36.3 (17 Dec 2019 05:09), Max: 36.7 (16 Dec 2019 07:59)  T(F): 97.3 (17 Dec 2019 05:09), Max: 98 (16 Dec 2019 07:59)  HR: 87 (17 Dec 2019 05:09) (56 - 87)  BP: 139/73 (17 Dec 2019 05:09) (106/86 - 139/73)  BP(mean): --  RR: 17 (17 Dec 2019 05:09) (17 - 18)  SpO2: 100% (17 Dec 2019 05:09) (97% - 100%)    CAPILLARY BLOOD GLUCOSE          I&O's Detail    15 Dec 2019 07:01  -  16 Dec 2019 07:00  --------------------------------------------------------  IN:    dextrose 5% + sodium chloride 0.45%.: 675 mL  Total IN: 675 mL    OUT:    Voided: 1900 mL  Total OUT: 1900 mL    Total NET: -1225 mL      16 Dec 2019 07:01  -  17 Dec 2019 06:21  --------------------------------------------------------  IN:  Total IN: 0 mL    OUT:    Voided: 1350 mL  Total OUT: 1350 mL    Total NET: -1350 mL          MEDICATIONS  (STANDING):  acetaminophen   Tablet .. 650 milliGRAM(s) Oral every 6 hours  artificial tears (preservative free) Ophthalmic Solution 1 Drop(s) Both EYES daily  bisacodyl Suppository 10 milliGRAM(s) Rectal daily  enoxaparin Injectable 30 milliGRAM(s) SubCutaneous at bedtime  gabapentin 200 milliGRAM(s) Oral three times a day  lidocaine   Patch 1 Patch Transdermal daily  melatonin 3 milliGRAM(s) Oral at bedtime  pantoprazole    Tablet 40 milliGRAM(s) Oral before breakfast  polyethylene glycol 3350 17 Gram(s) Oral daily  senna 2 Tablet(s) Oral at bedtime  sucralfate 1 Gram(s) Oral four times a day  tiZANidine 1 milliGRAM(s) Oral every 6 hours    MEDICATIONS  (PRN):      Physical Exam:  Gen: ill appearing in no acute distress  Resp: No additional work of breathing   GI: Soft, non-tender, non-distended, with normoactive bowel sounds.  No masses.  Well healed surgical scar.  MSK: Moves all extremities equally  Skin: No rashes    Labs:    12-16    138  |  102  |  6<L>  ----------------------------<  81  4.1   |  26  |  0.79    Ca    9.6      16 Dec 2019 05:10  Phos  3.1     12-16  Mg     2.0     12-16                              12.6   2.87  )-----------( 176      ( 16 Dec 2019 05:10 )             37.6         Imaging:

## 2019-12-18 LAB
ANION GAP SERPL CALC-SCNC: 11 MMO/L — SIGNIFICANT CHANGE UP (ref 7–14)
BUN SERPL-MCNC: 16 MG/DL — SIGNIFICANT CHANGE UP (ref 7–23)
CALCIUM SERPL-MCNC: 9.3 MG/DL — SIGNIFICANT CHANGE UP (ref 8.4–10.5)
CHLORIDE SERPL-SCNC: 101 MMOL/L — SIGNIFICANT CHANGE UP (ref 98–107)
CO2 SERPL-SCNC: 26 MMOL/L — SIGNIFICANT CHANGE UP (ref 22–31)
CREAT SERPL-MCNC: 0.78 MG/DL — SIGNIFICANT CHANGE UP (ref 0.5–1.3)
GLUCOSE SERPL-MCNC: 80 MG/DL — SIGNIFICANT CHANGE UP (ref 70–99)
HCT VFR BLD CALC: 35.9 % — SIGNIFICANT CHANGE UP (ref 34.5–45)
HGB BLD-MCNC: 11.8 G/DL — SIGNIFICANT CHANGE UP (ref 11.5–15.5)
MAGNESIUM SERPL-MCNC: 2 MG/DL — SIGNIFICANT CHANGE UP (ref 1.6–2.6)
MCHC RBC-ENTMCNC: 30.1 PG — SIGNIFICANT CHANGE UP (ref 27–34)
MCHC RBC-ENTMCNC: 32.9 % — SIGNIFICANT CHANGE UP (ref 32–36)
MCV RBC AUTO: 91.6 FL — SIGNIFICANT CHANGE UP (ref 80–100)
NRBC # FLD: 0 K/UL — SIGNIFICANT CHANGE UP (ref 0–0)
PHOSPHATE SERPL-MCNC: 3.2 MG/DL — SIGNIFICANT CHANGE UP (ref 2.5–4.5)
PLATELET # BLD AUTO: 169 K/UL — SIGNIFICANT CHANGE UP (ref 150–400)
PMV BLD: 10.5 FL — SIGNIFICANT CHANGE UP (ref 7–13)
POTASSIUM SERPL-MCNC: 4 MMOL/L — SIGNIFICANT CHANGE UP (ref 3.5–5.3)
POTASSIUM SERPL-SCNC: 4 MMOL/L — SIGNIFICANT CHANGE UP (ref 3.5–5.3)
RBC # BLD: 3.92 M/UL — SIGNIFICANT CHANGE UP (ref 3.8–5.2)
RBC # FLD: 12.7 % — SIGNIFICANT CHANGE UP (ref 10.3–14.5)
SODIUM SERPL-SCNC: 138 MMOL/L — SIGNIFICANT CHANGE UP (ref 135–145)
WBC # BLD: 2.88 K/UL — LOW (ref 3.8–10.5)
WBC # FLD AUTO: 2.88 K/UL — LOW (ref 3.8–10.5)

## 2019-12-18 RX ORDER — TIZANIDINE 4 MG/1
1 TABLET ORAL EVERY 6 HOURS
Refills: 0 | Status: DISCONTINUED | OUTPATIENT
Start: 2019-12-18 | End: 2019-12-18

## 2019-12-18 RX ORDER — ACETAMINOPHEN 500 MG
650 TABLET ORAL EVERY 6 HOURS
Refills: 0 | Status: DISCONTINUED | OUTPATIENT
Start: 2019-12-18 | End: 2019-12-20

## 2019-12-18 RX ORDER — TIZANIDINE 4 MG/1
2 TABLET ORAL EVERY 6 HOURS
Refills: 0 | Status: COMPLETED | OUTPATIENT
Start: 2019-12-18 | End: 2019-12-19

## 2019-12-18 RX ADMIN — Medication 1 GRAM(S): at 05:16

## 2019-12-18 RX ADMIN — TIZANIDINE 2 MILLIGRAM(S): 4 TABLET ORAL at 05:16

## 2019-12-18 RX ADMIN — LIDOCAINE 1 PATCH: 4 CREAM TOPICAL at 12:07

## 2019-12-18 RX ADMIN — Medication 1 DROP(S): at 12:08

## 2019-12-18 RX ADMIN — LIDOCAINE 1 PATCH: 4 CREAM TOPICAL at 05:15

## 2019-12-18 RX ADMIN — Medication 1 GRAM(S): at 18:07

## 2019-12-18 RX ADMIN — TIZANIDINE 2 MILLIGRAM(S): 4 TABLET ORAL at 23:23

## 2019-12-18 RX ADMIN — GABAPENTIN 200 MILLIGRAM(S): 400 CAPSULE ORAL at 21:05

## 2019-12-18 RX ADMIN — Medication 650 MILLIGRAM(S): at 18:55

## 2019-12-18 RX ADMIN — LIDOCAINE 1 PATCH: 4 CREAM TOPICAL at 19:21

## 2019-12-18 RX ADMIN — PANTOPRAZOLE SODIUM 40 MILLIGRAM(S): 20 TABLET, DELAYED RELEASE ORAL at 05:16

## 2019-12-18 RX ADMIN — Medication 650 MILLIGRAM(S): at 12:06

## 2019-12-18 RX ADMIN — TIZANIDINE 2 MILLIGRAM(S): 4 TABLET ORAL at 18:07

## 2019-12-18 RX ADMIN — ENOXAPARIN SODIUM 30 MILLIGRAM(S): 100 INJECTION SUBCUTANEOUS at 21:05

## 2019-12-18 RX ADMIN — Medication 650 MILLIGRAM(S): at 05:16

## 2019-12-18 RX ADMIN — Medication 1 GRAM(S): at 23:23

## 2019-12-18 RX ADMIN — LIDOCAINE 1 PATCH: 4 CREAM TOPICAL at 23:25

## 2019-12-18 RX ADMIN — Medication 10 MILLIGRAM(S): at 12:08

## 2019-12-18 RX ADMIN — Medication 650 MILLIGRAM(S): at 13:00

## 2019-12-18 RX ADMIN — POLYETHYLENE GLYCOL 3350 17 GRAM(S): 17 POWDER, FOR SOLUTION ORAL at 12:08

## 2019-12-18 RX ADMIN — Medication 650 MILLIGRAM(S): at 18:22

## 2019-12-18 RX ADMIN — GABAPENTIN 200 MILLIGRAM(S): 400 CAPSULE ORAL at 14:32

## 2019-12-18 RX ADMIN — Medication 1 GRAM(S): at 12:09

## 2019-12-18 RX ADMIN — GABAPENTIN 200 MILLIGRAM(S): 400 CAPSULE ORAL at 05:16

## 2019-12-18 RX ADMIN — TIZANIDINE 2 MILLIGRAM(S): 4 TABLET ORAL at 12:09

## 2019-12-18 NOTE — CHART NOTE - NSCHARTNOTEFT_GEN_A_CORE
NUTRITION FOLLOW-UP: Pt states that she eating very well. She states she is also drinking the 3 Ensure /day that were ordered. Pt had previously refused any supplement. Pt's weight was 33 kg on admission. Pt has lost 1.2 kg since admission despite Pt stating she is eating well.     Weight: 31.8 kg    Malnutrition Status: Ongoing severe malnutrition    Labs:      Diet: Regular with Ensure Enlive x3/day    Enteral Recommendations: Continue Ensure Supplements    RD to Remain Available: Mady Mancia MS, RDN, CDN pager [phone]     Additional Recommendations:   1) Monitor weight, labs, po intake and skin integrity.

## 2019-12-18 NOTE — PROGRESS NOTE ADULT - ASSESSMENT
62F with h/o pancreatic ca s/p whipple procedure in 2011 with complication of incisional hernia repaired May 2017, depression, anxiety (now off meds), anorexia,  bells palsy present to the ED c/o abdominal pain for the past 2 years that has been getting progressively worse.   - Clear for discharge, no further inpatient intervention necessary   - Regular diet  - DVT ppx w/ Lovenox  - Analgesia per pain management recs

## 2019-12-18 NOTE — PROGRESS NOTE ADULT - SUBJECTIVE AND OBJECTIVE BOX
Surgery Progress Note     Subjective/24hour Events:   Patient seen and examined.   No acute events overnight.   Continues to endorse chronic abdominal pain     Vital Signs:  Vital Signs Last 24 Hrs  T(C): 36.3 (18 Dec 2019 09:08), Max: 36.8 (17 Dec 2019 11:58)  T(F): 97.4 (18 Dec 2019 09:08), Max: 98.3 (17 Dec 2019 11:58)  HR: 82 (18 Dec 2019 09:08) (60 - 82)  BP: 119/74 (18 Dec 2019 09:08) (100/58 - 135/83)  BP(mean): --  RR: 18 (18 Dec 2019 09:08) (17 - 18)  SpO2: 99% (18 Dec 2019 09:08) (98% - 100%)    CAPILLARY BLOOD GLUCOSE          I&O's Detail    17 Dec 2019 07:01  -  18 Dec 2019 07:00  --------------------------------------------------------  IN:    Oral Fluid: 720 mL  Total IN: 720 mL    OUT:    Voided: 1725 mL  Total OUT: 1725 mL    Total NET: -1005 mL          MEDICATIONS  (STANDING):  acetaminophen   Tablet .. 650 milliGRAM(s) Oral every 6 hours  artificial tears (preservative free) Ophthalmic Solution 1 Drop(s) Both EYES daily  bisacodyl Suppository 10 milliGRAM(s) Rectal daily  enoxaparin Injectable 30 milliGRAM(s) SubCutaneous at bedtime  gabapentin 200 milliGRAM(s) Oral three times a day  lidocaine   Patch 1 Patch Transdermal daily  melatonin 3 milliGRAM(s) Oral at bedtime  pantoprazole    Tablet 40 milliGRAM(s) Oral before breakfast  polyethylene glycol 3350 17 Gram(s) Oral daily  senna 2 Tablet(s) Oral at bedtime  sucralfate 1 Gram(s) Oral four times a day  tiZANidine 1 milliGRAM(s) Oral every 6 hours    MEDICATIONS  (PRN):      Physical Exam:  Gen: NAD  Resp: No additional work of breathing   GI: Soft, non-tender, non-distended, with normoactive bowel sounds.  No masses.  Well healed surgical scar.  MSK: Moves all extremities equally    Labs:    12-18    138  |  101  |  16  ----------------------------<  80  4.0   |  26  |  0.78    Ca    9.3      18 Dec 2019 05:45  Phos  3.2     12-18  Mg     2.0     12-18                              11.8   2.88  )-----------( 169      ( 18 Dec 2019 05:45 )             35.9         Imaging:

## 2019-12-19 LAB
ANION GAP SERPL CALC-SCNC: 13 MMO/L — SIGNIFICANT CHANGE UP (ref 7–14)
BUN SERPL-MCNC: 15 MG/DL — SIGNIFICANT CHANGE UP (ref 7–23)
CALCIUM SERPL-MCNC: 9.2 MG/DL — SIGNIFICANT CHANGE UP (ref 8.4–10.5)
CHLORIDE SERPL-SCNC: 99 MMOL/L — SIGNIFICANT CHANGE UP (ref 98–107)
CO2 SERPL-SCNC: 24 MMOL/L — SIGNIFICANT CHANGE UP (ref 22–31)
CREAT SERPL-MCNC: 0.74 MG/DL — SIGNIFICANT CHANGE UP (ref 0.5–1.3)
GLUCOSE SERPL-MCNC: 75 MG/DL — SIGNIFICANT CHANGE UP (ref 70–99)
HCT VFR BLD CALC: 36.2 % — SIGNIFICANT CHANGE UP (ref 34.5–45)
HGB BLD-MCNC: 11.8 G/DL — SIGNIFICANT CHANGE UP (ref 11.5–15.5)
MAGNESIUM SERPL-MCNC: 2 MG/DL — SIGNIFICANT CHANGE UP (ref 1.6–2.6)
MCHC RBC-ENTMCNC: 29.3 PG — SIGNIFICANT CHANGE UP (ref 27–34)
MCHC RBC-ENTMCNC: 32.6 % — SIGNIFICANT CHANGE UP (ref 32–36)
MCV RBC AUTO: 89.8 FL — SIGNIFICANT CHANGE UP (ref 80–100)
NRBC # FLD: 0 K/UL — SIGNIFICANT CHANGE UP (ref 0–0)
PHOSPHATE SERPL-MCNC: 2.9 MG/DL — SIGNIFICANT CHANGE UP (ref 2.5–4.5)
PLATELET # BLD AUTO: 184 K/UL — SIGNIFICANT CHANGE UP (ref 150–400)
PMV BLD: 10.7 FL — SIGNIFICANT CHANGE UP (ref 7–13)
POTASSIUM SERPL-MCNC: 3.9 MMOL/L — SIGNIFICANT CHANGE UP (ref 3.5–5.3)
POTASSIUM SERPL-SCNC: 3.9 MMOL/L — SIGNIFICANT CHANGE UP (ref 3.5–5.3)
RBC # BLD: 4.03 M/UL — SIGNIFICANT CHANGE UP (ref 3.8–5.2)
RBC # FLD: 12.6 % — SIGNIFICANT CHANGE UP (ref 10.3–14.5)
SODIUM SERPL-SCNC: 136 MMOL/L — SIGNIFICANT CHANGE UP (ref 135–145)
WBC # BLD: 3.02 K/UL — LOW (ref 3.8–10.5)
WBC # FLD AUTO: 3.02 K/UL — LOW (ref 3.8–10.5)

## 2019-12-19 PROCEDURE — 99231 SBSQ HOSP IP/OBS SF/LOW 25: CPT

## 2019-12-19 RX ORDER — MULTIVIT WITH MIN/MFOLATE/K2 340-15/3 G
1 POWDER (GRAM) ORAL ONCE
Refills: 0 | Status: COMPLETED | OUTPATIENT
Start: 2019-12-19 | End: 2019-12-19

## 2019-12-19 RX ADMIN — GABAPENTIN 200 MILLIGRAM(S): 400 CAPSULE ORAL at 05:13

## 2019-12-19 RX ADMIN — Medication 650 MILLIGRAM(S): at 12:45

## 2019-12-19 RX ADMIN — LIDOCAINE 1 PATCH: 4 CREAM TOPICAL at 12:06

## 2019-12-19 RX ADMIN — Medication 650 MILLIGRAM(S): at 23:24

## 2019-12-19 RX ADMIN — Medication 1 GRAM(S): at 05:13

## 2019-12-19 RX ADMIN — Medication 1 GRAM(S): at 17:16

## 2019-12-19 RX ADMIN — TIZANIDINE 2 MILLIGRAM(S): 4 TABLET ORAL at 05:13

## 2019-12-19 RX ADMIN — ENOXAPARIN SODIUM 30 MILLIGRAM(S): 100 INJECTION SUBCUTANEOUS at 23:03

## 2019-12-19 RX ADMIN — GABAPENTIN 200 MILLIGRAM(S): 400 CAPSULE ORAL at 12:07

## 2019-12-19 RX ADMIN — Medication 1 GRAM(S): at 12:06

## 2019-12-19 RX ADMIN — Medication 1 GRAM(S): at 23:02

## 2019-12-19 RX ADMIN — PANTOPRAZOLE SODIUM 40 MILLIGRAM(S): 20 TABLET, DELAYED RELEASE ORAL at 05:13

## 2019-12-19 RX ADMIN — GABAPENTIN 200 MILLIGRAM(S): 400 CAPSULE ORAL at 23:02

## 2019-12-19 RX ADMIN — LIDOCAINE 1 PATCH: 4 CREAM TOPICAL at 19:26

## 2019-12-19 RX ADMIN — Medication 1 DROP(S): at 12:06

## 2019-12-19 RX ADMIN — Medication 650 MILLIGRAM(S): at 12:05

## 2019-12-19 NOTE — PROGRESS NOTE ADULT - ASSESSMENT
62F with h/o pancreatic ca s/p whipple procedure in 2011 with complication of incisional hernia repaired May 2017, depression, anxiety (now off meds), anorexia,  bells palsy present to the ED c/o abdominal pain for the past 2 years that has been getting progressively worse.   - Clear for discharge, no further inpatient intervention necessary   - Regular diet  - Protonix / carafate for gastritis/ulcers  - DVT ppx w/ Lovenox  - Miralax daily for bowel regimen will give magnesium citrate x once today  - Will follow up with pain management regarding regimen  - Discharge planning with outpatient follow up    D Team Surgery #[id number]

## 2019-12-19 NOTE — PROGRESS NOTE ADULT - SUBJECTIVE AND OBJECTIVE BOX
D TEAM SURGERY PROGRESS NOTE    SUBJECTIVE: Patient seen and examined at bedside on AM rounds, reports no pain overnight but states "it is early". Denies chest pain/SOB. Denies nausea/vomiting.     Vital Signs Last 24 Hrs  T(C): 36.7 (19 Dec 2019 07:55), Max: 37 (19 Dec 2019 05:12)  T(F): 98.1 (19 Dec 2019 07:55), Max: 98.6 (19 Dec 2019 05:12)  HR: 74 (19 Dec 2019 07:55) (60 - 82)  BP: 113/71 (19 Dec 2019 07:55) (108/74 - 131/73)  BP(mean): --  RR: 18 (19 Dec 2019 07:55) (16 - 18)  SpO2: 99% (19 Dec 2019 07:55) (99% - 100%)  I&O's Detail    18 Dec 2019 07:01  -  19 Dec 2019 07:00  --------------------------------------------------------  IN:    Oral Fluid: 480 mL  Total IN: 480 mL    OUT:    Voided: 1000 mL  Total OUT: 1000 mL    Total NET: -520 mL      19 Dec 2019 07:01  -  19 Dec 2019 08:20  --------------------------------------------------------  IN:  Total IN: 0 mL    OUT:    Voided: 300 mL  Total OUT: 300 mL    Total NET: -300 mL      MEDICATIONS  (STANDING):  artificial tears (preservative free) Ophthalmic Solution 1 Drop(s) Both EYES daily  bisacodyl Suppository 10 milliGRAM(s) Rectal daily  enoxaparin Injectable 30 milliGRAM(s) SubCutaneous at bedtime  gabapentin 200 milliGRAM(s) Oral three times a day  lidocaine   Patch 1 Patch Transdermal daily  magnesium citrate Oral Solution 1 Bottle Oral once  melatonin 3 milliGRAM(s) Oral at bedtime  pantoprazole    Tablet 40 milliGRAM(s) Oral before breakfast  polyethylene glycol 3350 17 Gram(s) Oral daily  senna 2 Tablet(s) Oral at bedtime  sucralfate 1 Gram(s) Oral four times a day    MEDICATIONS  (PRN):  acetaminophen   Tablet .. 650 milliGRAM(s) Oral every 6 hours PRN Mild Pain (1 - 3)      Physical Exam  General: A&Ox3, NAD, cachectic   Respiratory: Clear bilaterally, equal bilateral expansion  Cardiovascular: Regular rate & rhythm  Abdominal: Soft, NT/ND    LABS:                        11.8   3.02  )-----------( 184      ( 19 Dec 2019 05:30 )             36.2     12-19    136  |  99  |  15  ----------------------------<  75  3.9   |  24  |  0.74    Ca    9.2      19 Dec 2019 05:30  Phos  2.9     12-19  Mg     2.0     12-19

## 2019-12-20 ENCOUNTER — TRANSCRIPTION ENCOUNTER (OUTPATIENT)
Age: 64
End: 2019-12-20

## 2019-12-20 VITALS
SYSTOLIC BLOOD PRESSURE: 105 MMHG | TEMPERATURE: 98 F | RESPIRATION RATE: 16 BRPM | HEART RATE: 82 BPM | DIASTOLIC BLOOD PRESSURE: 62 MMHG | OXYGEN SATURATION: 100 %

## 2019-12-20 PROCEDURE — 99238 HOSP IP/OBS DSCHRG MGMT 30/<: CPT

## 2019-12-20 RX ORDER — SENNA PLUS 8.6 MG/1
2 TABLET ORAL
Qty: 0 | Refills: 0 | DISCHARGE
Start: 2019-12-20

## 2019-12-20 RX ORDER — POLYETHYLENE GLYCOL 3350 17 G/17G
17 POWDER, FOR SOLUTION ORAL
Qty: 0 | Refills: 0 | DISCHARGE
Start: 2019-12-20

## 2019-12-20 RX ADMIN — Medication 650 MILLIGRAM(S): at 00:21

## 2019-12-20 RX ADMIN — PANTOPRAZOLE SODIUM 40 MILLIGRAM(S): 20 TABLET, DELAYED RELEASE ORAL at 05:09

## 2019-12-20 RX ADMIN — Medication 1 GRAM(S): at 13:12

## 2019-12-20 RX ADMIN — Medication 1 GRAM(S): at 05:09

## 2019-12-20 RX ADMIN — GABAPENTIN 200 MILLIGRAM(S): 400 CAPSULE ORAL at 05:09

## 2019-12-20 RX ADMIN — Medication 650 MILLIGRAM(S): at 10:58

## 2019-12-20 RX ADMIN — LIDOCAINE 1 PATCH: 4 CREAM TOPICAL at 00:20

## 2019-12-20 RX ADMIN — GABAPENTIN 200 MILLIGRAM(S): 400 CAPSULE ORAL at 13:46

## 2019-12-20 NOTE — PROGRESS NOTE ADULT - REASON FOR ADMISSION
Chronic abdominal pain

## 2019-12-20 NOTE — PROGRESS NOTE ADULT - SUBJECTIVE AND OBJECTIVE BOX
D TEAM SURGERY PROGRESS NOTE    SUBJECTIVE: Patient seen and examined at bedside on AM rounds, patient reports pain was a little better overnight. Did not finish entire bottle of magnesium citrate, did not have bowel movement. Passing flatus. Has been tolerating diet, and repots that she's eating well. Denies chest pain/SOB. Denies nausea/vomiting.    Vital Signs Last 24 Hrs  T(C): 36.6 (20 Dec 2019 05:09), Max: 36.8 (19 Dec 2019 15:44)  T(F): 97.9 (20 Dec 2019 05:09), Max: 98.2 (19 Dec 2019 15:44)  HR: 68 (20 Dec 2019 05:09) (60 - 75)  BP: 105/66 (20 Dec 2019 05:09) (101/70 - 123/65)  BP(mean): --  RR: 17 (20 Dec 2019 05:09) (17 - 18)  SpO2: 99% (20 Dec 2019 05:09) (99% - 100%)  I&O's Detail    19 Dec 2019 07:01  -  20 Dec 2019 07:00  --------------------------------------------------------  IN:    Oral Fluid: 720 mL  Total IN: 720 mL    OUT:    Voided: 1300 mL  Total OUT: 1300 mL    Total NET: -580 mL      MEDICATIONS  (STANDING):  artificial tears (preservative free) Ophthalmic Solution 1 Drop(s) Both EYES daily  bisacodyl Suppository 10 milliGRAM(s) Rectal daily  enoxaparin Injectable 30 milliGRAM(s) SubCutaneous at bedtime  gabapentin 200 milliGRAM(s) Oral three times a day  lidocaine   Patch 1 Patch Transdermal daily  melatonin 3 milliGRAM(s) Oral at bedtime  pantoprazole    Tablet 40 milliGRAM(s) Oral before breakfast  polyethylene glycol 3350 17 Gram(s) Oral daily  senna 2 Tablet(s) Oral at bedtime  sucralfate 1 Gram(s) Oral four times a day    MEDICATIONS  (PRN):  acetaminophen   Tablet .. 650 milliGRAM(s) Oral every 6 hours PRN Mild Pain (1 - 3)      Physical Exam  General: A&Ox3, NAD  Respiratory: Clear bilaterally, equal bilateral expansion  Cardiovascular: Regular rate & rhythm  Abdominal: Soft, NT/ND    LABS:                        11.8   3.02  )-----------( 184      ( 19 Dec 2019 05:30 )             36.2     12-19    136  |  99  |  15  ----------------------------<  75  3.9   |  24  |  0.74    Ca    9.2      19 Dec 2019 05:30  Phos  2.9     12-19  Mg     2.0     12-19

## 2019-12-20 NOTE — PROGRESS NOTE ADULT - ASSESSMENT
62F with h/o pancreatic ca s/p whipple procedure in 2011 with complication of incisional hernia repaired May 2017, depression, anxiety (now off meds), anorexia,  bells palsy present to the ED c/o abdominal pain for the past 2 years that has been getting progressively worse.   - Clear for discharge, no further inpatient intervention necessary   - Regular diet  - Protonix / carafate for gastritis/ulcers  - DVT ppx w/ Lovenox  - Miralax daily for bowel regimen, encouraged patient to finish magnesium citrate bottle  - Discharge with outpatient follow up    D Team Surgery #[id number]

## 2019-12-20 NOTE — DISCHARGE NOTE NURSING/CASE MANAGEMENT/SOCIAL WORK - NSDCPNINST_GEN_ALL_CORE
Call 911 for chest pain, and/or difficulty breathing. Report to your doctor any fever, pain not relieved by pain medications.

## 2019-12-20 NOTE — DISCHARGE NOTE NURSING/CASE MANAGEMENT/SOCIAL WORK - PATIENT PORTAL LINK FT
You can access the FollowMyHealth Patient Portal offered by [facility] by registering at the following website: [email or web address]. By joining [facility]’s FollowMyHealth portal, you will also be able to view your health information using other applications (apps) compatible with our system.

## 2020-01-16 ENCOUNTER — RESULT REVIEW (OUTPATIENT)
Age: 65
End: 2020-01-16

## 2020-01-24 NOTE — PROGRESS NOTE BEHAVIORAL HEALTH - FUND OF KNOWLEDGE
instructions?:  Yes  Do you have all of your prescriptions and are they filled?:   (Comment: no new medications - has all meds prescribed)  Have you been contacted by a [address]?:  No  Have you scheduled your follow up appointment?:  Yes (Comment: 1/29 - cardio, 2/10 - PCP office)  How are you going to get to your appointment?:  Car - drive self  Were you discharged with any Home Care or Post Acute Services:  No  Do you feel like you have everything you need to keep you well at home?:  Yes  Care Transitions Interventions                                 Follow Up  Future Appointments   Date Time Provider Julio Ratliff   1/29/2020 11:00 AM MD STEVE Villanueva [facility]   2/10/2020  2:00 PM ESTHER Salmon - CNP [facility]   3/4/2020 10:30 AM [facility]   6/3/2020  9:15 AM MD STEVE Rhoades [facility]       Cristal Ramesh, RN Normal

## 2020-10-12 ENCOUNTER — TRANSCRIPTION ENCOUNTER (OUTPATIENT)
Age: 65
End: 2020-10-12

## 2020-10-27 ENCOUNTER — APPOINTMENT (OUTPATIENT)
Dept: SURGICAL ONCOLOGY | Facility: CLINIC | Age: 65
End: 2020-10-27
Payer: MEDICAID

## 2020-10-27 VITALS
SYSTOLIC BLOOD PRESSURE: 122 MMHG | BODY MASS INDEX: 13.41 KG/M2 | TEMPERATURE: 98.2 F | HEART RATE: 79 BPM | OXYGEN SATURATION: 100 % | DIASTOLIC BLOOD PRESSURE: 79 MMHG | WEIGHT: 71 LBS | HEIGHT: 61 IN

## 2020-10-27 PROCEDURE — 99072 ADDL SUPL MATRL&STAF TM PHE: CPT

## 2020-10-27 PROCEDURE — 99214 OFFICE O/P EST MOD 30 MIN: CPT

## 2020-10-27 NOTE — REASON FOR VISIT
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

## 2020-10-27 NOTE — ASSESSMENT
[FreeTextEntry1] : IMP:\par History of Whipple 2011 for a villous adenoma in the CBD.  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

## 2020-10-27 NOTE — PHYSICAL EXAM
[Normal] : supple, no neck mass and thyroid not enlarged [Normal Supraclavicular Lymph Nodes] : normal supraclavicular lymph nodes [Normal Groin Lymph Nodes] : normal groin lymph nodes [Normal] : oriented to person, place and time, with appropriate affect [de-identified] : No hernia; abdomen soft

## 2020-10-27 NOTE — HISTORY OF PRESENT ILLNESS
[de-identified] : Sade is a 65 year-old patient of my associate Dr. Damion Mauro. She is s/p Whipple 8/2/2011 following identification of a villous adenoma in the CBD on EGD. Final pathology was consistent with intraductal papillary adenoma with high grade dysplasia of the cystic duct and CBD with 8 negative lymph nodes. 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 The cystic duct margin was involved.\par \par She developed a large incisional hernia, and ultimately underwent x-lap, BENJI, enterotomy x 2 with RNY jejunojejunostomy and hernia repair with abdominal wall reconstruction (Dr. Nagel) on 5/16/17. Final pathology was benign. \par \par Dr. Jay Seay referred her for a PET/CT on 9/20/18 which showed interval development of minimally hypermetabolic soft tissue in the resection bed adjacent to the pancreatic enteric anastomosis and likely accounting for pancreatic ductal dilation. There is also abnormal enhancing lobular expansion of the pancreatic body/tail region, also associated with low grade metabolic activity. FIndings are suspicious for recurrent disease. Report stated that the low grade uptake is probably accounted for by neuroendocrine pathology. There is uptake in the distal rectum to anus of unclear significance and may represent fecal stasis versus an underlying partially obstructing mass lesion. There is limited anatomic characterization for subtle sites of potential nacho disease given her weight loss and developing anasarca. \par \par Given concern for neuroendocrine neoplasm on prior FDG PET, she was referred for a DOTATATE PET/CT on 12/5/18 which showed no evidence of somatostatin receptor-bearing lesions. \par \par She was referred for a renal protocol CT A/P 11/2019 for question of hematuria. Findings included pneumobilia with prominent main pancreatic duct to 4 mm.  Prominent pancreatic tail which appears larger versus PET in 2018, enlarged uterus and diffuse subcutaneous edema consistent with anasarca. \par \par She has continued to report persistent abdominal pain.  She sought care in the ED in December 2019.  CT was non revealing.  Upper endoscopy revealed mild nonbleeding erosion/ulceration and gastritis.  Biopsies were negative for malignancy and negative for H. pylori.  She was advised to follow up with GI upon discharge for continued care. \par \par she was seen in [place] ED 2 weeks ago for abdominal pain and weight loss.CT f chest, abdomen and pelvis was negative.\par

## 2020-11-24 ENCOUNTER — RX RENEWAL (OUTPATIENT)
Age: 65
End: 2020-11-24

## 2021-01-05 ENCOUNTER — APPOINTMENT (OUTPATIENT)
Dept: SURGICAL ONCOLOGY | Facility: CLINIC | Age: 66
End: 2021-01-05
Payer: MEDICAID

## 2021-01-05 VITALS
HEIGHT: 61 IN | BODY MASS INDEX: 13.22 KG/M2 | WEIGHT: 70 LBS | DIASTOLIC BLOOD PRESSURE: 85 MMHG | HEART RATE: 72 BPM | SYSTOLIC BLOOD PRESSURE: 137 MMHG

## 2021-01-05 VITALS — TEMPERATURE: 98.2 F

## 2021-01-05 PROCEDURE — 99214 OFFICE O/P EST MOD 30 MIN: CPT

## 2021-01-05 PROCEDURE — 99072 ADDL SUPL MATRL&STAF TM PHE: CPT

## 2021-01-05 NOTE — ASSESSMENT
[FreeTextEntry1] : IMP:\par History of Whipple 2011 for a villous adenoma in the CBD.  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 Prominent pancreatic tail on prior CT 2019.\par \par \par \par PLAN:\par Creon 4 times daily\par RTO 6 months\par yarly CT\par \par

## 2021-01-05 NOTE — PHYSICAL EXAM
[Normal] : supple, no neck mass and thyroid not enlarged [Normal Supraclavicular Lymph Nodes] : normal supraclavicular lymph nodes [Normal Groin Lymph Nodes] : normal groin lymph nodes [Normal] : oriented to person, place and time, with appropriate affect [de-identified] : very thin but no masses

## 2021-01-05 NOTE — HISTORY OF PRESENT ILLNESS
[de-identified] : Sade is a 65 year-old patient of my associate Dr. Damion Mauro. \par \par She is s/p Whipple 8/2/2011 following identification of a villous adenoma in the CBD on EGD. Final pathology was consistent with intraductal papillary adenoma with high grade dysplasia of the cystic duct and CBD with 8 negative lymph nodes. 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 The cystic duct margin was involved.\par \par She developed a large incisional hernia, and ultimately underwent x-lap, BENJI, enterotomy x 2 with RNY jejunojejunostomy and hernia repair with abdominal wall reconstruction (Dr. Nagel) on 5/16/17. Final pathology was benign. \par \par Dr. Jay Seay referred her for a PET/CT on 9/20/18 which showed interval development of minimally hypermetabolic soft tissue in the resection bed adjacent to the pancreatic enteric anastomosis and likely accounting for pancreatic ductal dilation. There is also abnormal enhancing lobular expansion of the pancreatic body/tail region, also associated with low grade metabolic activity. FIndings are suspicious for recurrent disease. Report stated that the low grade uptake is probably accounted for by neuroendocrine pathology. There is uptake in the distal rectum to anus of unclear significance and may represent fecal stasis versus an underlying partially obstructing mass lesion. There is limited anatomic characterization for subtle sites of potential nacho disease given her weight loss and developing anasarca. \par \par Given concern for neuroendocrine neoplasm on prior FDG PET, she was referred for a DOTATATE PET/CT on 12/5/18 which showed no evidence of somatostatin receptor-bearing lesions. \par \par She was referred for a renal protocol CT A/P 11/2019 for question of hematuria. Findings included pneumobilia with prominent main pancreatic duct to 4 mm.  Prominent pancreatic tail which appears larger versus PET in 2018, enlarged uterus and diffuse subcutaneous edema consistent with anasarca. \par \par She has continued to report persistent abdominal pain.  She sought care in the ED in December 2019.  CT was non revealing.  Upper endoscopy revealed mild nonbleeding erosion/ulceration and gastritis.  Biopsies were negative for malignancy and negative for H. pylori.  She was advised to follow up with GI upon discharge for continued care. \par \par She was seen in [place] ED  October 2020 for abdominal pain and weight loss.CT f chest, abdomen and pelvis was negative.\par \par Today, she states her weight has been fluctuating. She continues to have abdominal pain and bloating is unrelieved by Gas-X. She denies nausea,vomiting, constipation, or diarrhea. She has a good appetite.

## 2021-02-04 NOTE — ED PROVIDER NOTE - CROS ED NEURO ALL NEG
Note Title:  Pediatric Outpatient Psychotherapy Progress Note      Name:  Bee Valle    MRN:  [id number]    :  2006    Age:  14 y.o.    Pediatrician:  Jana Pleitez M.D.    Date of Service:  21    Service Rendered:  Individual and Family Psychotherapy, 60 minute via teledoc   Patient was presented for a telehealth consultation via secure and encrypted videoconferencing technology.   MOP consented     Persons in Attendance: Bee     Chief Complaint/ Reason for Appointment:   Chief Complaint   Patient presents with   • Depression   • Anxiety       Mental Status Exam:   Appearance:  Well groomed, good hygiene, appears stated age.   Behavior:  Pleasant, sociable, cooperative.   Mood:  Depressed   Affect:  Flat   Speech/Language:  Normal rate, rhythm, and tone.   Sensorium:  Alert and oriented to person, place, time, and situation.   Memory and Cognition:  Within normal limits, no evidence of gross cognitive, intellectual, or memory impairments.   Thought Process/Thought Content:  Logical, linear, goal directed. Reality testing appears intact.    Insight/Judgment: Within normal limits.     Goals and objectives addressed: reduce self-harm, reduce SI   Techniques and Interventions Used: CBT, psychoeducation    Issues Discussed:   Met with Pt for ongoing therapy session. Pt reports that she is having a difficult day but was unable to identify reasons for this. Therapist again utilized the metaphor of a wave and the idea of riding the wave until the period of sadness passes. Pt responded well to this. Therapist processed with pt her ongoing worry in relation to her mother and her mother's job. Assisted Pt with reframing this and focusing on things that she can control and letting go of things she cannot control.   Pt was able to identify positives since previous session including time spent with MOP watching movies. Pt also was able to report a positive for the weekend in terms of seeing a friend.  "Therapist processed with Pt her worry about allowing herself to be happy due to worrying that others would then not believe that she is depressed. Therapist provided psychoeducation on depression and that moments of happiness are typical within these down periods.     Progress towards goals: Patient responded positively to interventions   Risk Assessment:  Bee reports that she is having feelings of being \"unworthy\" and reports that she has thoughts to kill herself but denies plan or intent.     Diagnostic Impressions:   1. Severe episode of recurrent major depressive disorder, without psychotic features (HCC)     2. Generalized anxiety disorder       Treatment Recommendations and Plan:    Continue individual therapy to improve mood and reduce SI     The above diagnostic impressions, recommendations, and treatment plan were discussed with and agreed upon by Bee, and his/her caregivers. Care will be coordinated with Bee's healthcare team, as appropriate.      Iris Demarco PsyD   Licensed Psychologist, [place] # [id number]  [facility], Behavioral Health     " negative...

## 2021-04-24 NOTE — PROGRESS NOTE ADULT - SUBJECTIVE AND OBJECTIVE BOX
back SUBJECTIVE:  Doing well.   No overnight events.   Patient would like to have the NGT and doan removed today. Was supposed to have PCEA removed yesterday but anesthesia deferred 2/2 elevated PTT (per RN)    OBJECTIVE:     ** VITAL SIGNS / I&O's **    Vital Signs Last 24 Hrs  T(C): 37.1, Max: 37.8 (05-19 @ 16:50)  T(F): 98.7, Max: 100.1 (05-19 @ 16:50)  HR: 97 (86 - 97)  BP: 145/67 (132/71 - 171/83)  BP(mean): --  RR: 17 (16 - 18)  SpO2: 98% (98% - 100%)      I & Os for current day (as of 20 May 2017 08:24)  =============================================  IN:    dextrose 5% + sodium chloride 0.45% with potassium chloride 20 mEq/L: 1000 ml    Total IN: 1000 ml  ---------------------------------------------  OUT:    Indwelling Catheter - Urethral: 3075 ml    Nasoenteral Tube: 300 ml    Bulb: 27.5 ml    Bulb: 20 ml    Bulb: 12.5 ml    Total OUT: 3435 ml  ---------------------------------------------  Total NET: -2435 ml      ** PHYSICAL EXAM **    -- CONSTITUTIONAL: AOx3. NAD.   -- HEENT: NGT in place draining bilious material.  Re-secured to nose  -- ABDOMEN: Softly distended.   -- EXTREMITIES:       ** LABS **                          11.7   5.67  )-----------( 198      ( 20 May 2017 06:12 )             37.4     20 May 2017 06:12    139    |  102    |  3      ----------------------------<  117    4.1     |  27     |  0.55     Ca    8.9        20 May 2017 06:12  Phos  2.4       19 May 2017 05:54  Mg     2.6       19 May 2017 05:54    TPro  7.0    /  Alb  3.3    /  TBili  0.5    /  DBili  x      /  AST  39     /  ALT  25     /  AlkPhos  61     20 May 2017 06:12    PT/INR - ( 20 May 2017 06:12 )   PT: 12.6 SEC;   INR: 1.12          PTT - ( 20 May 2017 06:12 )  PTT:30.2 SEC  CAPILLARY BLOOD GLUCOSE            A/P: POD#      s/p             - Pain control  - IS  - Ambulate  - DVT prophylaxis SUBJECTIVE:  Doing well.   No overnight events. Denies Bm or flatus  Patient would like to have the NGT and doan removed today. Was supposed to have PCEA removed yesterday but anesthesia deferred 2/2 elevated PTT (per RN)    OBJECTIVE:     ** VITAL SIGNS / I&O's **    Vital Signs Last 24 Hrs  T(C): 37.1, Max: 37.8 (05-19 @ 16:50)  T(F): 98.7, Max: 100.1 (05-19 @ 16:50)  HR: 97 (86 - 97)  BP: 145/67 (132/71 - 171/83)  BP(mean): --  RR: 17 (16 - 18)  SpO2: 98% (98% - 100%)      I & Os for current day (as of 20 May 2017 08:24)  =============================================  IN:    dextrose 5% + sodium chloride 0.45% with potassium chloride 20 mEq/L: 1000 ml    Total IN: 1000 ml  ---------------------------------------------  OUT:    Indwelling Catheter - Urethral: 3075 ml    Nasoenteral Tube: 300 ml    Bulb: 27.5 ml    Bulb: 20 ml    Bulb: 12.5 ml    Total OUT: 3435 ml  ---------------------------------------------  Total NET: -2435 ml      ** PHYSICAL EXAM **    -- CONSTITUTIONAL: AOx3. NAD.   -- HEENT: NGT in place draining bilious material.  Re-secured to nose  -- ABDOMEN: Binder in place. Softly distended abdomen. Incision C/D/I w/o surrounding erythema. Non-tender to palpation.   -- EXTREMITIES: SCDs in place      ** LABS **                          11.7   5.67  )-----------( 198      ( 20 May 2017 06:12 )             37.4     20 May 2017 06:12    139    |  102    |  3      ----------------------------<  117    4.1     |  27     |  0.55     Ca    8.9        20 May 2017 06:12  Phos  2.4       19 May 2017 05:54  Mg     2.6       19 May 2017 05:54    TPro  7.0    /  Alb  3.3    /  TBili  0.5    /  DBili  x      /  AST  39     /  ALT  25     /  AlkPhos  61     20 May 2017 06:12    PT/INR - ( 20 May 2017 06:12 )   PT: 12.6 SEC;   INR: 1.12          PTT - ( 20 May 2017 06:12 )  PTT:30.2 SEC        A/P: POD# 4     s/p b/l component separation and ventral hernia repair, still awaiting return of bowel function      - Pain control - PCEA to be removed today by anesthesia. Subsequent pain medication management per General Surgery  - IS/Pulmonary toilet  - Ambulate  - DVT prophylaxis  - Continue with binder

## 2021-04-28 NOTE — ASU PREOP CHECKLIST - ORDERS/MEDICATION ADMINISTRATION RECORD ON CHART
From: Jeannie Connors  To: Javier Resendez DO  Sent: 4/27/2021 9:37 PM EDT  Subject: Prescription Question    Hello,  I wanted to see if it was possible to have an antibiotic called in? Denise Morillo came home complaining of a headache (which he never gets) He's had it for a while now and also has a horrible sore throat. Then, I started feeling bad yesterday. 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 I'm assuming that's what's going on. ..      Thanks,  Wyatt Mcdonough
done

## 2021-07-06 ENCOUNTER — APPOINTMENT (OUTPATIENT)
Dept: SURGICAL ONCOLOGY | Facility: CLINIC | Age: 66
End: 2021-07-06
Payer: MEDICAID

## 2021-07-06 VITALS
SYSTOLIC BLOOD PRESSURE: 117 MMHG | HEIGHT: 61 IN | BODY MASS INDEX: 14.73 KG/M2 | WEIGHT: 78 LBS | HEART RATE: 69 BPM | DIASTOLIC BLOOD PRESSURE: 79 MMHG

## 2021-07-06 PROCEDURE — 99214 OFFICE O/P EST MOD 30 MIN: CPT

## 2021-07-06 NOTE — PHYSICAL EXAM
[Normal] : supple, no neck mass and thyroid not enlarged [Normal Supraclavicular Lymph Nodes] : normal supraclavicular lymph nodes [Normal Groin Lymph Nodes] : normal groin lymph nodes [Normal] : oriented to person, place and time, with appropriate affect [de-identified] : no masses or tenderness

## 2021-07-06 NOTE — HISTORY OF PRESENT ILLNESS
[de-identified] : Sade is a 66 year-old patient of my associate Dr. Damion Mauro. \par \par She is s/p Whipple 8/2/2011 following identification of a villous adenoma in the CBD on EGD. Final pathology was consistent with intraductal papillary adenoma with high grade dysplasia of the cystic duct and CBD with 8 negative lymph nodes. 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 The cystic duct margin was involved.\par \par She developed a large incisional hernia, and ultimately underwent x-lap, BENJI, enterotomy x 2 with RNY jejunojejunostomy and hernia repair with abdominal wall reconstruction (Dr. Nagel) on 5/16/17. Final pathology was benign. \par \par Dr. Jay Seay referred her for a PET/CT on 9/20/18 which showed interval development of minimally hypermetabolic soft tissue in the resection bed adjacent to the pancreatic enteric anastomosis and likely accounting for pancreatic ductal dilation. There is also abnormal enhancing lobular expansion of the pancreatic body/tail region, also associated with low grade metabolic activity. FIndings are suspicious for recurrent disease. Report stated that the low grade uptake is probably accounted for by neuroendocrine pathology. There is uptake in the distal rectum to anus of unclear significance and may represent fecal stasis versus an underlying partially obstructing mass lesion. There is limited anatomic characterization for subtle sites of potential nacho disease given her weight loss and developing anasarca. \par \par Given concern for neuroendocrine neoplasm on prior FDG PET, she was referred for a DOTATATE PET/CT on 12/5/18 which showed no evidence of somatostatin receptor-bearing lesions. \par \par She was referred for a renal protocol CT A/P 11/2019 for question of hematuria. Findings included pneumobilia with prominent main pancreatic duct to 4 mm.  Prominent pancreatic tail which appears larger versus PET in 2018, enlarged uterus and diffuse subcutaneous edema consistent with anasarca. \par \par She has continued to report persistent abdominal pain.  She sought care in the ED in December 2019.  CT was non revealing.  Upper endoscopy revealed mild nonbleeding erosion/ulceration and gastritis.  Biopsies were negative for malignancy and negative for H. pylori.  She was advised to follow up with GI upon discharge for continued care. \par \par She was seen in [place] ED  October 2020 for abdominal pain and weight loss.CT f chest, abdomen and pelvis was negative.\par \par 1/5/21: she states her weight has been fluctuating. She continues to have abdominal pain and bloating is unrelieved by Gas-X. She denies nausea,vomiting, constipation, or diarrhea. She has a good appetite. \par \par Today, she states she continue to have abdominal pain and bloating. She denies nausea, vomiting, constipation, or diarrhea. She states she recently had a CT scan at [place] in May 2021, we will request these results.

## 2021-07-06 NOTE — ASSESSMENT
[FreeTextEntry1] : IMP:\par History of Whipple 2011 for a villous adenoma in the CBD.  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 Prominent pancreatic tail on prior CT 2019.\par \par \par \par PLAN:\par Carafate 4 times daily\par Creon 4 times daily\par RTO 6 months\par yearly CT\par \par

## 2021-07-29 NOTE — PACU DISCHARGE NOTE - HYDRATION STATUS:
Mohs Surgery Follow Up    Date of Surgery: 9/16/21  Referring Provider: Dr. Li FREEMAN: He presents for a 10 month follow up s/p Mohs surgery and reconstruction for a Basal Cell Carcinoma  located on the left inferior eyelid.    The patient has had kenalog injections for a sensation of pulling on the left inferior lid. He does not think it has changed since last visit. He says \"it does not feel the way it did before the surgery\" or compared to his other eye.    ALLERGIES:   Allergies as of 07/29/2021   • (No Known Allergies)       REVIEW OF SYSTEMS  Constitutional:  No associated fever, chills, sweats, weight loss or gain and fatigue.    Integument:  No additional rashes, itching, hair or nail changes.        PHYSICAL EXAMINATION  CONSTITUTIONAL:  Well appearing patient  NEUROLOGICAL/PSYCHIATRIC:  The patient appears alert and oriented to person, place and time  SKIN:  The face was visually examined and/or palpated.  Examination revealed the following pertinent findings:  -well healed surgical site, no clinically significant ectropion noted on exam    ASSESSMENT AND PLAN:  1) Sensation of pulling on left lower lid after mohs surgery.  Today we had a long discussion regarding his symptoms and expectations. I told him that I cannot notice any pulling on the lid today but I acknowledge his symptoms and his concerns. I did tell him that there will always be a scar there and it may always feel different than his other eye because of this. We talked about the purpose of the flap closure to fill in a space that would have contracted and pulled more significantly if left to granulate.     He is also upset about the pink spots on his skin after liquid nitrogen treatment by general dermatology and he reports that he has interviewed someone 3 times recently to see if they are qualified to cut his hair.     I recommend that he see Dr. Rivero for her opinion as to whether any type of flap or releasing procedure can be done to  help with his sensation of pulling. I have recommended oculoplastics consultation several times before but he has declined and does not want to drive far.    At the end of our discussion he was satisfied with our plan. I also encouraged him to follow up with Dr. Jefferson for a skin check.    RTC prn    Abi Hernandez MD             Satisfactory

## 2021-08-03 ENCOUNTER — RX RENEWAL (OUTPATIENT)
Age: 66
End: 2021-08-03

## 2021-10-19 ENCOUNTER — APPOINTMENT (OUTPATIENT)
Dept: OTOLARYNGOLOGY | Facility: CLINIC | Age: 66
End: 2021-10-19
Payer: MEDICAID

## 2021-10-19 VITALS
WEIGHT: 70 LBS | BODY MASS INDEX: 13.22 KG/M2 | SYSTOLIC BLOOD PRESSURE: 141 MMHG | DIASTOLIC BLOOD PRESSURE: 91 MMHG | HEART RATE: 83 BPM | TEMPERATURE: 97.8 F | HEIGHT: 61 IN

## 2021-10-19 DIAGNOSIS — M81.0 AGE-RELATED OSTEOPOROSIS W/OUT CURRENT PATHOLOGICAL FRACTURE: ICD-10-CM

## 2021-10-19 PROCEDURE — 99204 OFFICE O/P NEW MOD 45 MIN: CPT

## 2021-10-19 RX ORDER — SUCRALFATE 1 G/1
1 TABLET ORAL 4 TIMES DAILY
Qty: 120 | Refills: 11 | Status: COMPLETED | COMMUNITY
Start: 2021-07-06 | End: 2021-10-19

## 2021-10-19 RX ORDER — HYDROCORTISONE 25 MG/G
2.5 CREAM TOPICAL
Qty: 90 | Refills: 0 | Status: ACTIVE | COMMUNITY
Start: 2021-05-14

## 2021-10-19 RX ORDER — PANTOPRAZOLE 40 MG/1
40 TABLET, DELAYED RELEASE ORAL
Qty: 30 | Refills: 2 | Status: COMPLETED | COMMUNITY
Start: 2018-03-05 | End: 2021-10-19

## 2021-10-19 RX ORDER — ALENDRONATE SODIUM 70 MG/1
70 TABLET ORAL
Qty: 12 | Refills: 0 | Status: ACTIVE | COMMUNITY
Start: 2021-04-19

## 2021-10-19 RX ORDER — SUCRALFATE 1 G/1
1 TABLET ORAL 4 TIMES DAILY
Qty: 120 | Refills: 0 | Status: COMPLETED | COMMUNITY
Start: 2020-02-04 | End: 2021-10-19

## 2021-10-19 RX ORDER — LORAZEPAM 1 MG/1
1 TABLET ORAL
Qty: 60 | Refills: 0 | Status: COMPLETED | COMMUNITY
Start: 2018-02-28 | End: 2021-10-19

## 2021-10-19 NOTE — HISTORY OF PRESENT ILLNESS
[de-identified] : 66 yro female referred by Dr. 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 Pt first noticed lump in neck about 7 months ago (3/2021). Seems smaller since biopsy on 9/29/21. Today pt denies neck pain, dysphagia, dyspnea, dysphonia. No fever, chills. Weight has been stable over the last 4 years, but pt has lost about 70 pounds in the past 10 years due to Whipple surgery (2011) and hernia repair (2017).\par \par FNA 9/29/21 of thyroglossal cyst 2.5cm was benign, consisting with cyst content. \par \par US (R) 4/26/2021:\par 3 cm above the thyroid isthmus, a 2.5 x 1.5 x 3.4cm complex cystic mass. \par \par CT neck ([facility]) done 4/11/2021 while pt was in ED for stomach issues:\par findings consistent with thyroglossal duct cyst, with focus of mural calcification and/or enhancing soft tissue raising the possibility of TGDC carcinoma.

## 2021-10-19 NOTE — CONSULT LETTER
[Dear  ___] : Dear  [unfilled], [Consult Letter:] : I had the pleasure of evaluating your patient, [unfilled]. [Please see my note below.] : Please see my note below. [Consult Closing:] : Thank you very much for allowing me to participate in the care of this patient.  If you have any questions, please do not hesitate to contact me. [Sincerely,] : Sincerely, [FreeTextEntry2] : Dr Nadeem Hernandez [FreeTextEntry3] : \par Ortiz Manuel MD, FACS\par \par Otolaryngology-Head and Neck Surgery\par [employer] [facility]\par

## 2021-10-19 NOTE — REASON FOR VISIT
[Initial Evaluation] : an initial evaluation for [Other: _____] : [unfilled] [FreeTextEntry2] : thyroglossal duct cyst

## 2021-10-19 NOTE — PHYSICAL EXAM
[de-identified] : 2 cm midline cyst between the hyoid and thyroid cartilage. [Midline] : trachea located in midline position [Normal] : no rashes

## 2021-10-29 NOTE — DISCHARGE NOTE ADULT - CAREGIVER ADDRESS
[id number] [place] [address], [place] [address] Vital Signs Last 24 Hrs  T(C): 36.4 (29 Oct 2021 22:14), Max: 36.7 (29 Oct 2021 21:50)  T(F): 97.6 (29 Oct 2021 22:14), Max: 98 (29 Oct 2021 21:50)  HR: 55 (29 Oct 2021 22:14) (55 - 61)  BP: 149/59 (29 Oct 2021 22:14) (116/74 - 152/74)  BP(mean): --  RR: 17 (29 Oct 2021 22:14) (16 - 17)  SpO2: 100% (29 Oct 2021 22:14) (99% - 100%)

## 2021-11-26 ENCOUNTER — OUTPATIENT (OUTPATIENT)
Dept: OUTPATIENT SERVICES | Facility: HOSPITAL | Age: 66
LOS: 1 days | End: 2021-11-26
Payer: MEDICAID

## 2021-11-26 VITALS
HEIGHT: 61.5 IN | TEMPERATURE: 99 F | OXYGEN SATURATION: 98 % | WEIGHT: 76.94 LBS | SYSTOLIC BLOOD PRESSURE: 130 MMHG | RESPIRATION RATE: 16 BRPM | HEART RATE: 73 BPM | DIASTOLIC BLOOD PRESSURE: 68 MMHG

## 2021-11-26 DIAGNOSIS — Z85.07 PERSONAL HISTORY OF MALIGNANT NEOPLASM OF PANCREAS: Chronic | ICD-10-CM

## 2021-11-26 DIAGNOSIS — G51.0 BELL'S PALSY: Chronic | ICD-10-CM

## 2021-11-26 DIAGNOSIS — Z98.890 OTHER SPECIFIED POSTPROCEDURAL STATES: Chronic | ICD-10-CM

## 2021-11-26 DIAGNOSIS — Z01.812 ENCOUNTER FOR PREPROCEDURAL LABORATORY EXAMINATION: ICD-10-CM

## 2021-11-26 DIAGNOSIS — Q89.2 CONGENITAL MALFORMATIONS OF OTHER ENDOCRINE GLANDS: ICD-10-CM

## 2021-11-26 LAB
ANION GAP SERPL CALC-SCNC: 6 MMOL/L — LOW (ref 7–14)
BUN SERPL-MCNC: 20 MG/DL — SIGNIFICANT CHANGE UP (ref 7–23)
CALCIUM SERPL-MCNC: 9.5 MG/DL — SIGNIFICANT CHANGE UP (ref 8.4–10.5)
CHLORIDE SERPL-SCNC: 101 MMOL/L — SIGNIFICANT CHANGE UP (ref 98–107)
CO2 SERPL-SCNC: 30 MMOL/L — SIGNIFICANT CHANGE UP (ref 22–31)
CREAT SERPL-MCNC: 0.76 MG/DL — SIGNIFICANT CHANGE UP (ref 0.5–1.3)
GLUCOSE SERPL-MCNC: 107 MG/DL — HIGH (ref 70–99)
HCT VFR BLD CALC: 40.2 % — SIGNIFICANT CHANGE UP (ref 34.5–45)
HGB BLD-MCNC: 12.6 G/DL — SIGNIFICANT CHANGE UP (ref 11.5–15.5)
MCHC RBC-ENTMCNC: 30.1 PG — SIGNIFICANT CHANGE UP (ref 27–34)
MCHC RBC-ENTMCNC: 31.3 GM/DL — LOW (ref 32–36)
MCV RBC AUTO: 96.2 FL — SIGNIFICANT CHANGE UP (ref 80–100)
NRBC # BLD: 0 /100 WBCS — SIGNIFICANT CHANGE UP
NRBC # FLD: 0 K/UL — SIGNIFICANT CHANGE UP
PLATELET # BLD AUTO: 177 K/UL — SIGNIFICANT CHANGE UP (ref 150–400)
POTASSIUM SERPL-MCNC: 4.4 MMOL/L — SIGNIFICANT CHANGE UP (ref 3.5–5.3)
POTASSIUM SERPL-SCNC: 4.4 MMOL/L — SIGNIFICANT CHANGE UP (ref 3.5–5.3)
RBC # BLD: 4.18 M/UL — SIGNIFICANT CHANGE UP (ref 3.8–5.2)
RBC # FLD: 12.6 % — SIGNIFICANT CHANGE UP (ref 10.3–14.5)
SODIUM SERPL-SCNC: 137 MMOL/L — SIGNIFICANT CHANGE UP (ref 135–145)
WBC # BLD: 4.12 K/UL — SIGNIFICANT CHANGE UP (ref 3.8–10.5)
WBC # FLD AUTO: 4.12 K/UL — SIGNIFICANT CHANGE UP (ref 3.8–10.5)

## 2021-11-26 PROCEDURE — 93010 ELECTROCARDIOGRAM REPORT: CPT

## 2021-11-26 RX ORDER — ERGOCALCIFEROL 1.25 MG/1
1 CAPSULE ORAL
Qty: 0 | Refills: 0 | DISCHARGE

## 2021-11-26 RX ORDER — ESCITALOPRAM OXALATE 10 MG/1
1 TABLET, FILM COATED ORAL
Qty: 0 | Refills: 0 | DISCHARGE

## 2021-11-26 NOTE — H&P PST ADULT - HISTORY OF PRESENT ILLNESS
65y/o female presents for preop eval for scheduled excision of thyroglossal duct cyst.  Pt states self detected neck lump.  Evaluated by PCP and referred to surgeon.  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

## 2021-11-26 NOTE — H&P PST ADULT - MALLAMPATI CLASS
No risk alerts present small oral orifice/Class IV (difficult) - the soft palate is not visible at all

## 2021-11-26 NOTE — H&P PST ADULT - ALLERGY TYPES
codeine, sausages/reactions to medicines/reactions to food sausages/reactions to medicines/reactions to food

## 2021-11-26 NOTE — H&P PST ADULT - NEGATIVE NEUROLOGICAL SYMPTOMS
no weakness/no paresthesias/no generalized seizures/no syncope/no vertigo/no difficulty walking/no headache/no loss of consciousness

## 2021-11-26 NOTE — H&P PST ADULT - PROBLEM SELECTOR PLAN 1
scheduled excision of thyroglossal duct cyst on 12/8/2021  Written & verbal preop instructions, gi prophylaxis & surgical soap given  Pt verbalized good understanding.  Teach back done on surgical soap instructions.  BMI=14, thin looking  medical eval requested by surgeon & PST.  Pt states appointment on 12/2/21  pending copy of report

## 2021-11-26 NOTE — H&P PST ADULT - NSICDXPASTMEDICALHX_GEN_ALL_CORE_FT
PAST MEDICAL HISTORY:  Cholelithiasis, Common Bile Duct 1990    Depression     FUO (Fever of Unknown Origin) 1964- right facial palsy    Incisional hernia     Pancreatic cancer s/p Whipple in 2011     PAST MEDICAL HISTORY:  Cholelithiasis, Common Bile Duct 1990    Congenital malformations of other endocrine glands     Depression     FUO (Fever of Unknown Origin) 1964- right facial palsy    Incisional hernia     Pancreatic cancer s/p Whipple in 2011

## 2021-11-29 ENCOUNTER — RX RENEWAL (OUTPATIENT)
Age: 66
End: 2021-11-29

## 2021-12-04 DIAGNOSIS — Z01.818 ENCOUNTER FOR OTHER PREPROCEDURAL EXAMINATION: ICD-10-CM

## 2021-12-05 ENCOUNTER — APPOINTMENT (OUTPATIENT)
Dept: DISASTER EMERGENCY | Facility: CLINIC | Age: 66
End: 2021-12-05

## 2021-12-06 LAB — SARS-COV-2 N GENE NPH QL NAA+PROBE: NOT DETECTED

## 2021-12-07 ENCOUNTER — TRANSCRIPTION ENCOUNTER (OUTPATIENT)
Age: 66
End: 2021-12-07

## 2021-12-07 NOTE — ASU PATIENT PROFILE, ADULT - NSICDXPASTMEDICALHX_GEN_ALL_CORE_FT
PAST MEDICAL HISTORY:  Cholelithiasis, Common Bile Duct 1990    Congenital malformations of other endocrine glands     Depression     FUO (Fever of Unknown Origin) 1964- right facial palsy    Incisional hernia     Pancreatic cancer s/p Whipple in 2011

## 2021-12-07 NOTE — ASU PATIENT PROFILE, ADULT - FALL HARM RISK - UNIVERSAL INTERVENTIONS
Bed in lowest position, wheels locked, appropriate side rails in place/Call bell, personal items and telephone in reach/Instruct patient to call for assistance before getting out of bed or chair/Non-slip footwear when patient is out of bed/[place] to call system/Physically safe environment - no spills, clutter or unnecessary equipment/Purposeful Proactive Rounding/Room/bathroom lighting operational, light cord in reach

## 2021-12-08 ENCOUNTER — OUTPATIENT (OUTPATIENT)
Dept: OUTPATIENT SERVICES | Facility: HOSPITAL | Age: 66
LOS: 1 days | Discharge: HOME | End: 2021-12-08
Payer: MEDICAID

## 2021-12-08 ENCOUNTER — APPOINTMENT (OUTPATIENT)
Dept: OTOLARYNGOLOGY | Facility: HOSPITAL | Age: 66
End: 2021-12-08

## 2021-12-08 ENCOUNTER — RESULT REVIEW (OUTPATIENT)
Age: 66
End: 2021-12-08

## 2021-12-08 VITALS
DIASTOLIC BLOOD PRESSURE: 78 MMHG | WEIGHT: 74.08 LBS | RESPIRATION RATE: 16 BRPM | TEMPERATURE: 98 F | HEART RATE: 79 BPM | HEIGHT: 61 IN | SYSTOLIC BLOOD PRESSURE: 141 MMHG | OXYGEN SATURATION: 99 %

## 2021-12-08 VITALS
DIASTOLIC BLOOD PRESSURE: 85 MMHG | SYSTOLIC BLOOD PRESSURE: 146 MMHG | HEART RATE: 87 BPM | RESPIRATION RATE: 20 BRPM | OXYGEN SATURATION: 98 %

## 2021-12-08 DIAGNOSIS — Z85.07 PERSONAL HISTORY OF MALIGNANT NEOPLASM OF PANCREAS: Chronic | ICD-10-CM

## 2021-12-08 DIAGNOSIS — G51.0 BELL'S PALSY: Chronic | ICD-10-CM

## 2021-12-08 DIAGNOSIS — Z98.890 OTHER SPECIFIED POSTPROCEDURAL STATES: Chronic | ICD-10-CM

## 2021-12-08 DIAGNOSIS — Q89.2 CONGENITAL MALFORMATIONS OF OTHER ENDOCRINE GLANDS: ICD-10-CM

## 2021-12-08 PROCEDURE — 88311 DECALCIFY TISSUE: CPT | Mod: 26

## 2021-12-08 PROCEDURE — 60280 REMOVE THYROID DUCT LESION: CPT

## 2021-12-08 PROCEDURE — 88304 TISSUE EXAM BY PATHOLOGIST: CPT | Mod: 26

## 2021-12-08 RX ORDER — HYDROMORPHONE HYDROCHLORIDE 2 MG/ML
0.5 INJECTION INTRAMUSCULAR; INTRAVENOUS; SUBCUTANEOUS
Refills: 0 | Status: DISCONTINUED | OUTPATIENT
Start: 2021-12-08 | End: 2021-12-08

## 2021-12-08 RX ORDER — OXYCODONE HYDROCHLORIDE 5 MG/1
5 TABLET ORAL ONCE
Refills: 0 | Status: DISCONTINUED | OUTPATIENT
Start: 2021-12-08 | End: 2021-12-08

## 2021-12-08 RX ORDER — SODIUM CHLORIDE 9 MG/ML
1000 INJECTION, SOLUTION INTRAVENOUS
Refills: 0 | Status: DISCONTINUED | OUTPATIENT
Start: 2021-12-08 | End: 2021-12-22

## 2021-12-08 RX ORDER — ONDANSETRON 8 MG/1
4 TABLET, FILM COATED ORAL EVERY 6 HOURS
Refills: 0 | Status: DISCONTINUED | OUTPATIENT
Start: 2021-12-08 | End: 2021-12-22

## 2021-12-08 RX ORDER — DIAZEPAM 5 MG
2 TABLET ORAL ONCE
Refills: 0 | Status: DISCONTINUED | OUTPATIENT
Start: 2021-12-08 | End: 2021-12-08

## 2021-12-08 RX ORDER — ALENDRONATE SODIUM 70 MG/1
1 TABLET ORAL
Qty: 0 | Refills: 0 | DISCHARGE

## 2021-12-08 RX ORDER — ONDANSETRON 8 MG/1
4 TABLET, FILM COATED ORAL ONCE
Refills: 0 | Status: DISCONTINUED | OUTPATIENT
Start: 2021-12-08 | End: 2021-12-22

## 2021-12-08 RX ORDER — IBUPROFEN 200 MG
600 TABLET ORAL EVERY 6 HOURS
Refills: 0 | Status: DISCONTINUED | OUTPATIENT
Start: 2021-12-08 | End: 2021-12-22

## 2021-12-08 RX ORDER — ERGOCALCIFEROL 1.25 MG/1
1 CAPSULE ORAL
Qty: 0 | Refills: 0 | DISCHARGE

## 2021-12-08 RX ORDER — ACETAMINOPHEN 500 MG
650 TABLET ORAL EVERY 6 HOURS
Refills: 0 | Status: DISCONTINUED | OUTPATIENT
Start: 2021-12-08 | End: 2021-12-22

## 2021-12-08 RX ORDER — LIPASE/PROTEASE/AMYLASE 16-48-48K
1 CAPSULE,DELAYED RELEASE (ENTERIC COATED) ORAL
Qty: 0 | Refills: 0 | DISCHARGE

## 2021-12-08 RX ADMIN — HYDROMORPHONE HYDROCHLORIDE 0.5 MILLIGRAM(S): 2 INJECTION INTRAMUSCULAR; INTRAVENOUS; SUBCUTANEOUS at 18:14

## 2021-12-08 RX ADMIN — OXYCODONE HYDROCHLORIDE 5 MILLIGRAM(S): 5 TABLET ORAL at 18:30

## 2021-12-08 RX ADMIN — Medication 2 MILLIGRAM(S): at 19:32

## 2021-12-08 RX ADMIN — OXYCODONE HYDROCHLORIDE 5 MILLIGRAM(S): 5 TABLET ORAL at 18:16

## 2021-12-08 RX ADMIN — HYDROMORPHONE HYDROCHLORIDE 0.5 MILLIGRAM(S): 2 INJECTION INTRAMUSCULAR; INTRAVENOUS; SUBCUTANEOUS at 18:30

## 2021-12-08 RX ADMIN — SODIUM CHLORIDE 75 MILLILITER(S): 9 INJECTION, SOLUTION INTRAVENOUS at 18:10

## 2021-12-08 NOTE — ASU DISCHARGE PLAN (ADULT/PEDIATRIC) - CARE PROVIDER_API CALL
Ortiz Manuel)  Otolaryngology  [address] [place] [address]  Phone: [phone]  Fax: [phone]  Follow Up Time:

## 2021-12-08 NOTE — ASU DISCHARGE PLAN (ADULT/PEDIATRIC) - CALL YOUR DOCTOR IF YOU HAVE ANY OF THE FOLLOWING:
Wound/Surgical Site with redness, or foul smelling discharge or pus Swelling that gets worse/Pain not relieved by Medications/Fever greater than (need to indicate Fahrenheit or Celsius)/Wound/Surgical Site with redness, or foul smelling discharge or pus

## 2021-12-08 NOTE — ASU DISCHARGE PLAN (ADULT/PEDIATRIC) - FOLLOW UP APPOINTMENTS
[facility] may also call Recovery Room (PACU) 24/7 @ [phone]/[facility]

## 2021-12-08 NOTE — ASU DISCHARGE PLAN (ADULT/PEDIATRIC) - NS MD DC FALL RISK RISK
For information on Fall & Injury Prevention, visit: [email or web address] OR  [email or web address] OR  https://www.cdc.gov/steadi/patient.html

## 2021-12-14 NOTE — H&P ADULT - NSHPLABSRESULTS_GEN_ALL_CORE
Chief Complaint:   Chief Complaint   Patient presents with    Facial Swelling     right side. stated last night. HPIfacial swelling rt side   Afebrile      Patient Active Problem List   Diagnosis    Palpitations    Atypical chest pain    Atrial septal aneurysm    Type 2 diabetes mellitus without complication, without long-term current use of insulin (HCC)    Hyperlipidemia    Essential hypertension       Past Medical History:   Diagnosis Date    Atrial septal aneurysm     Gall bladder disease     Herniated disc     Hyperlipidemia     Hypertension     Infectious lymphangitis of deep lymphatics     Kidney stone        Past Surgical History:   Procedure Laterality Date     SECTION      WISDOM TOOTH EXTRACTION         Current Outpatient Medications   Medication Sig Dispense Refill    meloxicam (MOBIC) 15 MG tablet TAKE 1 TABLET BY MOUTH EVERY DAY 90 tablet 3    D3-50 1.25 MG (43273 UT) CAPS TAKE 1 CAPSULE BY MOUTH EVERY WEEK 12 capsule 0    DULoxetine (CYMBALTA) 60 MG extended release capsule Take 1 capsule by mouth daily 90 capsule 3    metoprolol tartrate (LOPRESSOR) 50 MG tablet Take 1 tablet by mouth 2 times daily 180 tablet 3    ARIPiprazole (ABILIFY) 5 MG tablet Take 1 tablet by mouth daily 90 tablet 3    atorvastatin (LIPITOR) 40 MG tablet TAKE 1 TABLET BY MOUTH EVERY DAY 90 tablet 3    hydroCHLOROthiazide (HYDRODIURIL) 25 MG tablet TAKE 1 TABLET BY MOUTH EVERY DAY 90 tablet 1    meclizine (ANTIVERT) 25 MG tablet TAKE 1 TABLET BY MOUTH EVERY 8 HOURS AS NEEDED      naproxen (NAPROSYN) 500 MG tablet TAKE 1 TABLET BY MOUTH EVERY 12 HOURS      diclofenac sodium (VOLTAREN) 1 % GEL APPLY TO AFFECTED AREA THREE TIMES PER DAY       No current facility-administered medications for this visit.        No Known Allergies    Social History     Socioeconomic History    Marital status: Legally      Spouse name: None    Number of children: None    Years of education: None    Highest education level: None   Occupational History    None   Tobacco Use    Smoking status: Never Smoker    Smokeless tobacco: Never Used   Vaping Use    Vaping Use: Never used   Substance and Sexual Activity    Alcohol use: No    Drug use: No    Sexual activity: None   Other Topics Concern    None   Social History Narrative    None     Social Determinants of Health     Financial Resource Strain:     Difficulty of Paying Living Expenses: Not on file   Food Insecurity:     Worried About Running Out of Food in the Last Year: Not on file    Gurinder of Food in the Last Year: Not on file   Transportation Needs:     Lack of Transportation (Medical): Not on file    Lack of Transportation (Non-Medical): Not on file   Physical Activity:     Days of Exercise per Week: Not on file    Minutes of Exercise per Session: Not on file   Stress:     Feeling of Stress : Not on file   Social Connections:     Frequency of Communication with Friends and Family: Not on file    Frequency of Social Gatherings with Friends and Family: Not on file    Attends Buddhist Services: Not on file    Active Member of Clubs or Organizations: Not on file    Attends Club or Organization Meetings: Not on file    Marital Status: Not on file   Intimate Partner Violence:     Fear of Current or Ex-Partner: Not on file    Emotionally Abused: Not on file    Physically Abused: Not on file    Sexually Abused: Not on file   Housing Stability:     Unable to Pay for Housing in the Last Year: Not on file    Number of [place] in the Last Year: Not on file    Unstable Housing in the Last Year: Not on file       Family History   Problem Relation Age of Onset    Colon Cancer Father     Cancer Sister     Heart Disease Brother     Arthritis Brother     Hypertension Mother     Arthritis Mother     Heart Disease Mother          Review of Systems   Constitutional: Negative. HENT: Positive for facial swelling.  Negative for congestion, hearing loss, nosebleeds, sinus pressure and sore throat. Eyes: Negative for photophobia and visual disturbance. Respiratory: Negative for apnea, cough, chest tightness, shortness of breath and wheezing. Cardiovascular: Negative for chest pain, palpitations and leg swelling. Gastrointestinal: Negative for abdominal pain, blood in stool, diarrhea, nausea and vomiting. Genitourinary: Negative for difficulty urinating, frequency and urgency. Musculoskeletal: Negative for arthralgias, back pain, joint swelling and myalgias. Skin: Negative for color change and rash. Allergic/Immunologic: Negative. Neurological: Negative for syncope, weakness, light-headedness and headaches. Hematological: Negative. Psychiatric/Behavioral: Negative for agitation, behavioral problems, confusion and self-injury. The patient is not nervous/anxious. All other systems reviewed and are negative. Physical Exam  Vitals and nursing note reviewed. Constitutional:       General: She is not in acute distress. Appearance: She is well-developed. HENT:      Head: Normocephalic and atraumatic. Nose: Nose normal.   Eyes:      Conjunctiva/sclera: Conjunctivae normal.      Pupils: Pupils are equal, round, and reactive to light. Neck:      Thyroid: No thyromegaly. Vascular: No JVD. Cardiovascular:      Rate and Rhythm: Normal rate and regular rhythm. Heart sounds: Normal heart sounds. No murmur heard. No friction rub. No gallop. Pulmonary:      Effort: Pulmonary effort is normal. No respiratory distress. Breath sounds: Normal breath sounds. No wheezing. Abdominal:      General: Bowel sounds are normal. There is no distension. Palpations: Abdomen is soft. Tenderness: There is no abdominal tenderness. There is no guarding or rebound. Musculoskeletal:         General: Normal range of motion. Cervical back: Normal range of motion and neck supple. Lymphadenopathy:      Cervical: No cervical adenopathy. Skin:     General: Skin is warm and dry. Findings: No erythema or rash. Neurological:      Mental Status: She is alert and oriented to person, place, and time. Cranial Nerves: No cranial nerve deficit. Motor: No abnormal muscle tone. Coordination: Coordination normal.      Deep Tendon Reflexes: Reflexes are normal and symmetric. Psychiatric:         Behavior: Behavior normal.         Judgment: Judgment normal.                               ASSESSMENT/PLAN:    Shailesh Sweeney was seen today for facial swelling. Diagnoses and all orders for this visit:    Swelling of right side of face  -     CT FACIAL BONES WO CONTRAST;  Future            Cher Dubois DO    12/14/2021  3:24 PM Urinalysis (08.14.17 @ 14:58)    Color: PLYEL    Urine Appearance: HAZY    Glucose: NEGATIVE    Bilirubin: NEGATIVE    Ketone - Urine: MODERATE    Specific Gravity: 1.012    Blood: TRACE    pH - Urine: 6.0    Protein, Urine: NEGATIVE    Urobilinogen: NORMAL E.U.    Nitrite: NEGATIVE    Leukocyte Esterase Concentration: TRACE    Red Blood Cell - Urine: 0-2    White Blood Cell - Urine: 2-5    Mucus: FEW    Bacteria: FEW    Squamous Epithelial: MOD    Troponin T, Serum (08.14.17 @ 13:05)    Troponin T, Serum: < 0.06: INCLUDES THE 99th PERCENTILE OF A HEALTHY  POPULATION AT A  METHOD C.V. OF 10% OR LESS.    Comprehensive Metabolic Panel (08.14.17 @ 13:05)    Sodium, Serum: 138 mmol/L    Potassium, Serum: 4.0 mmol/L    Chloride, Serum: 96: Delta: 102 on 05/22/    Delta: 102 on 05/22/ mmol/L    Carbon Dioxide, Serum: 27 mmol/L    Blood Urea Nitrogen, Serum: 21 mg/dL    Creatinine, Serum: 0.71 mg/dL    Glucose, Serum: 81 mg/dL    Calcium, Total Serum: 9.6 mg/dL    Protein Total, Serum: 7.2 g/dL    Albumin, Serum: 3.7 g/dL    Bilirubin Total, Serum: 0.4 mg/dL    Alkaline Phosphatase, Serum: 62: Please note new reference ranges are adjusted for age and  gender. u/L    Aspartate Aminotransferase (AST/SGOT): 16 u/L    Alanine Aminotransferase (ALT/SGPT): 8 u/L    eGFR if Non : 93:     Complete Blood Count + Automated Diff (08.14.17 @ 13:05)    Nucleated RBC #: 0    WBC Count: 6.49 K/uL    RBC Count: 4.27 M/uL    Hemoglobin: 11.5 g/dL    Hematocrit: 36.4 %    Mean Cell Volume: 85.2 fL    Mean Cell Hemoglobin: 26.9 pg    Mean Cell Hemoglobin Conc: 31.6 %    Red Cell Distrib Width: 13.3 %    Platelet Count - Automated: 309 K/uL    MPV: 10.1 fl    Auto Neutrophil #: 4.82 K/uL    Auto Lymphocyte #: 1.27 K/uL    Auto Monocyte #: 0.31 K/uL    Auto Eosinophil #: 0.02 K/uL    Auto Basophil #: 0.04 K/uL    Auto Immature Granulocyte #: 0.03: (Includes meta, myelo and promyelocytes) #    Auto Neutrophil %: 74.2 %    Auto Lymphocyte %: 19.6 %    Auto Monocyte %: 4.8 %    Auto Eosinophil %: 0.3 %    Auto Basophil %: 0.6 %    Auto Immature Granulocyte %: 0.5: (Includes meta, myelo and promyelocytes) %    < from: CT Abdomen and Pelvis w/ Oral Cont and w/ IV Cont (08.14.17 @ 15:59) >    IMPRESSION:   Probable colitis.

## 2021-12-15 PROBLEM — Q89.2 CONGENITAL MALFORMATIONS OF OTHER ENDOCRINE GLANDS: Chronic | Status: ACTIVE | Noted: 2021-11-26

## 2021-12-15 LAB — SURGICAL PATHOLOGY STUDY: SIGNIFICANT CHANGE UP

## 2021-12-17 NOTE — ED PROVIDER NOTE - PMH
Cholelithiasis, Common Bile Duct  1990  Depression    FUO (Fever of Unknown Origin)  1964- right facial palsy  Incisional hernia    Pancreatic cancer  s/p Whipple in 2011
General

## 2022-01-03 NOTE — REVIEW OF SYSTEMS
Occupational Therapy Treatment    ASSESSMENT:   Pt admitted with R sided weakness; R facial droop; (+) ischemic stroke; not a tPA candidate due to hypertensive urgency. Prior to admission she lives in home with 13 steps to basement; with , 12 y/o son.  Pt was independent with transfers and mobility, no assistive device used for mobility. Pt is employed intermittently; temporary jobs.     Pt seated EOB at start of session, cleared by RN. Sister at bedside.Today's treatment focused on improving Ind with bed to chair transfers and participation in ADLs. The patient is demonstrating fair progress as evidenced by continued decreased motivation to participate in tasks secondary to difficulty. At this time the patient continues to demonstrate impairments in activity tolerance, balance, ROM, safety awareness, sensory changes and strength which are limiting the performance of bed mobility, toilet transfers, sit to/from stand transfers, ambulation and all ADLs. Reassessed R UE 0/5 strength. Co-tx with PT to optimize functional mobility during ADLs. Sit-stand transfer performed with out assistive device and maximal assist x 2 from EOB to bedside chair. Pt completed wheelchair mobility assisting with L UE and LE, fatigues easily and requests rest break, states \"its too difficult\". Pt completed sit to stand transfer from wheelchair with justin walker, verbal cues for hand plaecment, required max A x 2 and assist to ensure no buckling of R knee. Pt required assist to brush hair and put into pony tail.     Pt opened up and discussed interests, was more talking when sister showing therapists pictures of her baking.     Pt in wheelchair at end of session with chair alarm on and all needs within reach, handoff with RN. Further skilled occupational therapy services are reasonable and necessary to address the above impairments and performance deficits to maximize the patient's independence.           DIAGNOSIS & PAST MEDICAL  HISTORY:   Date Recent Hospital Event   2020  Hyperglycemia [R73.9]  CVA (cerebral vascular accident) (CMS/[facility]) [I63.9]  Hypertensive urgency [I16.0]  Cerebrovascular accident (CVA), unspecified mechanism (CMS/[facility]) [I63.9]                 Past Medical History:   Diagnosis Date   • Bell's palsy     one episode right facial droop   • Diabetes mellitus (CMS/[facility])    • Essential (primary) hypertension      Past Surgical History:   Procedure Laterality Date   • Ankle surgery     •  section, classic     •  section, low transverse     • Cholecystectomy     • Fracture surgery      left ankle   • Removal gallbladder     • Tonsillectomy          PRIOR LIVING SITUATION & PRIOR LEVEL OF FUNCTION:   Prior Living Situation:  Prior Living Situation  Information Provided By:: patient  Type of Home: House  Home Layout: (lives in basement)  # Steps to Enter: (ramp)  # Steps in the Home: 13  Lives With: Spouse, Parents  Receives Help From: Family  Bathroom Shower/Tub: Walk-in shower    Prior Communication and Cognition  Prior Communication/Cognition  Prior Cognition: Intact  Prior Auditory Comprehension: Intact  Prior Verbal Expression: Intact    Prior Level of Function:  Prior Function  Pre-Morbid Modified [place] Scale: No symptoms at all  Prior ADL: Independent  Eating: Independent  Grooming: Independent  Bathing: Independent  Upper Body Dressing: Independent  Lower Body Dressing: Independent  Toileting: Independent  Bed Mobility: Independent  Transfers: Independent  Toilet Transfers: Independent  Tub/Shower Transfers: Independent  Ambulation in the Home: Independent  Ambulation in the Community: Independent  Steps within the Home: Independent  Car Transfers: Independent     SUBJECTIVE:   Subjective: \"this is hard\" (20 1010)      Orientation Level: Oriented X4  Affect: Flat, Cooperative     PRECAUTIONS:   Precautions  Other Precautions: without knee immobilizer for slide board on   Precautions  Comments: Fall, Sling R UE        PAIN:      Pre-Tx During Tx Post-Tx   Pain Number        VITALS:      Pre-Tx During Tx Post Tx   BP      HR      SPO2 L of O2 L of O2 L of O2     OBJECTIVE:   Self Cares/ADLs:   Self Cares/ADL's  Lower Body Clothing Assistance: Total Assist - Dependent (03/03/20 1010)      Household Mobility:    Household Mobility  Bed to Chair: Substantial/Maximal Assistance(x 2) (03/03/20 1010)  Sit to Stand: Substantial/Maximal Assistance(x 2) (03/03/20 1010)           NEURO:   Neuro Comments:       Sensation:   RUE: Intact []  Impaired  []  Comments:  LUE: Intact []  Impaired  []  Comments:  RLE: Intact []  Impaired  []   Comments:  LLE: Intact []  Impaired  []   Comments:    Toe Tapping:  Intact []  Impaired  []   Comments:    Heel to Shin:  Intact []  Impaired  []   Comments:    Finger Opposition:  Intact []  Impaired  []   Comments:    Finger to Nose  Intact []  Impaired  []   Comments:    Modified Ranken Scale: Moderately severe disability: unable to walk without assistance and unable to attend to own bodily needs without assistance (02/14/20 1025)       EXERCISE:     Exercise Sets Reps Comments   Shoulder flexion      Elbow flexion/extension      Ankle pumps      Knee flexion/extension      Glut sets        AM-PAC 6 Click Daily Activity:   AM-[facility] 6 Clicks Daily Activity  Help needed to eat meals: None  Help needed for personal grooming : None  Help needed for bathing : A lot  Help needed to don/doff regular upper body clothing: A lot  Help needed to don/doff regular lower body clothing: A lot  Help needed for toileting: A lot  Daily Activity Raw Score: 16  Daily Activity Converted Score: 35.96     AM-PAC 6 CLICK APPLIED COGNITIVE:   Understanding 10-15 min speech: None  Follows familiar conversation: None  Remembering to take medication: A little  Remembering where things are: A little  Remembering 5 errands, no list: A lot  Taking care of complicated tasks: A lot  Applied Cognitive Raw  Score: 18  Applied Cognitive Converted Score: 38.07     GOALS:   Short Term Goals Are The Same as Discharge Goals: Yes (02/25/20 2000)  Goal Agreement: Patient agrees with goals and treatment plan (02/25/20 2000)  Bathing Discharge Goal 1: Mod Ind (02/14/20 1025)  Dressing Discharge Goal 1: Mod Ind (02/14/20 1025)  Toileting Discharge Goal 1: Mod Ind (02/14/20 1025)       PLAN AND RECOMMENDATIONS:   Recommendations for Discharge:  Recommendations for Discharge: OT IL: Patient needs intensive skilled therapy for a minimum of 3 hours daily by at least two disciplines with the oversight of a physical medicine and rehabilitation physician      Plan:   Continue skilled OT, including the following Treatment Interventions: UE strengthening/ROM;ADL retraining (03/02/20 0945)    OT Frequency: 5 days/week (03/02/20 0945)   Further skilled occupational therapy services are reasonable and necessary to address the above impairments and performance deficits to maximize the patient's independence              EQUIPMENT:   PT/OT ADL Equipment for Discharge: TBD (02/14/20 1025)        BILLING INFORMATION:   Total Treatment Time: OT Time Spent: 24 minutes   Timed Treatment Minutes:          [As Noted in HPI] : as noted in HPI

## 2022-01-04 ENCOUNTER — APPOINTMENT (OUTPATIENT)
Dept: OTOLARYNGOLOGY | Facility: CLINIC | Age: 67
End: 2022-01-04
Payer: MEDICAID

## 2022-01-04 VITALS — TEMPERATURE: 96 F

## 2022-01-04 VITALS
BODY MASS INDEX: 13.41 KG/M2 | HEART RATE: 75 BPM | HEIGHT: 61 IN | WEIGHT: 71 LBS | OXYGEN SATURATION: 98 % | SYSTOLIC BLOOD PRESSURE: 127 MMHG | DIASTOLIC BLOOD PRESSURE: 84 MMHG

## 2022-01-04 DIAGNOSIS — Q89.2 CONGENITAL MALFORMATIONS OF OTHER ENDOCRINE GLANDS: ICD-10-CM

## 2022-01-04 PROCEDURE — 99024 POSTOP FOLLOW-UP VISIT: CPT

## 2022-01-04 NOTE — PHYSICAL EXAM
[de-identified] : Incision C/D/I [Midline] : trachea located in midline position [Normal] : no rashes

## 2022-01-04 NOTE — CONSULT LETTER
[Dear  ___] : Dear  [unfilled], [Courtesy Letter:] : I had the pleasure of seeing your patient, [unfilled], in my office today. [Please see my note below.] : Please see my note below. [Consult Closing:] : Thank you very much for allowing me to participate in the care of this patient.  If you have any questions, please do not hesitate to contact me. [Sincerely,] : Sincerely, [FreeTextEntry2] : Dr Nadeem Hernandez  [FreeTextEntry3] : \par Ortiz Manuel MD, FACS\par \par Otolaryngology-Head and Neck Surgery\par [employer] [facility]\par

## 2022-01-04 NOTE — REASON FOR VISIT
[Post-Operative Visit] : a post-operative visit [FreeTextEntry2] : s/p excision of thyroglossal duct cyst on 12/8/2021

## 2022-01-04 NOTE — HISTORY OF PRESENT ILLNESS
[de-identified] : 66 yro female referred by Dr. 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 Pt first noticed lump in neck about 10 months ago (3/2021). FNA 9/29/21 of thyroglossal cyst 2.5cm was benign, consisting with cyst content. Pt is here today for f/up s/p excision of thyroglossal duct cyst on 12/8/2021. \par \par surgical path 12/8/2021: \par Final Diagnosis\par 1. 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

## 2022-02-03 NOTE — H&P PST ADULT - NEUROLOGICAL
Patient Active Problem List    Diagnosis Date Noted     Stage 3a chronic kidney disease (H) 12/31/2020     Priority: High     Persistent protienuria       Copper deficiency 04/05/2019     Priority: High     Identified in Aug, 2018--see discharge summary from [facility] in [place].       Chronic pain disorder 10/07/2016     Priority: High     Chronic Pain Diagnosis:  neuropathy  DIRE Total Score(s):14     ORT: 5    4 - 7 =  Moderate Risk   of future problems with Opioids  FAQ5: 50 November 20, 2020  Behavioral Health Consultation: 5/12/17 with Dr. Daisy Quezada  Personal Care Plan for Chronic Pain: 5/12/17  Opioid medication:oxycodone   Dose:10mg  Number of pills per month:60  Patient is being prescribed 10mg of oxycodone IR (Percocet) per day this is 15 mg Morphine Equivalents  Benzodiazepines Prescribed? No  Naloxone prescribed? No       Clinic visit frequency required: Q 1 month Next visit due:   Controlled Substance/Opioid Treatment agreement on file (dated <12 months ago)?:    Date(s): pending  Last [place] verification: last visit  Items in red to be updated at each visit  Patient is followed by Data Unavailable for ongoing prescription of pain medication.  All refills should be approved by this provider, or covering partner.Chronic Pain     Chronic Pain Diagnosis:  neuropathy  DIRE Total Score(s):14     ORT: 5    4 - 7 =  Moderate Risk   of future problems with Opioids  FAQ5: 45/100 on 9/9/16, 40/100 on 11/15/16  55 October 29, 2020    Behavioral Health Consultation: 5/12/17 with Dr. Daisy Quezada  Personal Care Plan for Chronic Pain: 5/12/17  Opioid medication:oxycodone   Dose:10mg  Number of pills per month:90  Patient is being prescribed 45 MME  Benzodiazepines Prescribed? No  Naloxone prescribed? No       Clinic visit frequency required: Q 1 month Next visit due:   Controlled Substance/Opioid Treatment agreement on file (dated <12 months ago)?:    Date(s): pending  Last [place]MP verification: last  visit  Items in red to be updated at each visit  Patient is followed by Data Unavailable for ongoing prescription of pain medication.  All refills should be approved by this provider, or covering partner.Chronic Pain       Microalbuminuria 10/12/2015     Priority: High     Severely increased albuminuria       Type 2 diabetes mellitus with diabetic polyneuropathy (H) 10/12/2015     Priority: High     Essential hypertension 08/26/2013     Priority: High     Arthritis of both acromioclavicular joints 04/08/2021     Priority: Medium     xray proven        Morbid obesity (H) 11/27/2020     Priority: Medium     Hyperglycemia due to type 2 diabetes mellitus (H) 06/02/2020     Priority: Medium     Neck mass 10/11/2019     Priority: Medium     Added automatically from request for surgery [id number]       Metabolic acidosis 07/26/2019     Priority: Medium     reolved w/ discontinuation of ASA       Obesity 01/03/2019     Priority: Medium     Other acute pulmonary embolism without acute cor pulmonale (H) 12/31/2018     Priority: Medium     Hernia of anterior abdominal wall 01/28/2017     Priority: Medium     Osteoarthritis of both knees 10/31/2016     Priority: Medium     Polyneuropathy due to secondary diabetes mellitus (H) 10/31/2016     Priority: Medium     Compression neuropathy of left lower extremity 02/19/2016     Priority: Medium     Bilateral ankle joint pain 01/15/2016     Priority: Medium     Esophageal reflux 04/07/2015     Priority: Medium     Urinary incontinence 02/19/2015     Priority: Medium     Asthma 08/04/2013     Priority: Medium     History of cholecystectomy 08/04/2013     Priority: Medium     H/O necrotizing fasciitis 08/04/2013     Priority: Medium     Hyperlipidemia 06/21/2013     Priority: Medium     Status post bariatric surgery 06/21/2013     Priority: Medium     Kim-en-Y in 2006       Moderate major depression (H) 01/24/2013     Priority: Medium     Moderate persistent asthma without complication  11/15/2012     Priority: Medium     Persons encountering health services in other specified circumstances 11/15/2012     Priority: Medium     Tier 3    Status: Accept  Care Coordination Start Date: 02/27/12  State Tier Level: Level 3  Language/Barrier to Learning: No  Significant Mental Health Issues: Yes  Date Care Coordination Discontinued: 05/28/15    DX V65.8 REPLACED WITH 21007 [facility] (04/08/2013)       Abnormal Pap smear of cervix 04/22/2019     Priority: Low     3-12-15: Pap/HPV neg.  Plan: Given hx (mildly abn pap, hyster), recheck in 5 yrs.  10-11-11: Pap/HPV neg.  Plan: Given ASUS pap in 2010, recommend repeating pap in 1 year.  8/3/2010 ASCUS Pap with +BV- pt treated with Metrogel.  Pt needs repeat Pap in 1yr.  8/4/2008 Pt had total hyst for menorrhagia, pelvic pain, scar tissue, and fibroids.    3-12-15: Pap/HPV neg.  Plan: Given hx (mildly abn pap, hyster), recheck in 5 yrs.  10-11-11: Pap/HPV neg.  Plan: Given ASUS pap in 2010, recommend repeating pap in 1 year.  8/3/2010 ASCUS Pap with +BV- pt treated with Metrogel.  Pt needs repeat Pap in 1yr.  8/4/2008 Pt had total hyst for menorrhagia, pelvic pain, scar tissue, and fibroids.       Hernia 01/27/2017     Priority: Low     S/P hysterectomy 06/21/2013     Priority: Low     2008, For fibroids.  Cervix removed, but still has ovaries.       History of hysterectomy for benign disease 06/21/2013     Priority: Low     2008, For fibroids.  Cervix removed, but still has ovaries.       Carpal tunnel syndrome 11/15/2012     Priority: Low     Pulmonary embolism with infarction (H) 11/15/2012     Priority: Low     12/24/2018  Unprovoked.  + fam hx of DVT.  Treated w/ DOAC 12/2018through 9/2019 12/24/2018  Unprovoked.  + fam hx of DVT.  Plan warfarin for 6 months.       Health Care Home 11/15/2012     Priority: Low     Tier 3    Status: Accept  Care Coordination Start Date: 02/27/12  State Tier Level: Level 3  Language/Barrier to Learning:  No  Significant Mental Health Issues: Yes  Date Care Coordination Discontinued: 05/28/15    DX V65.8 REPLACED WITH [id number] [facility] (04/08/2013)         Chronic pain of both shoulders 12/23/2005     Priority: Low     Nursing Notes:   Viridiana Loaiza CMA  2/3/2022 12:19 PM  Signed  Clinic Administered Medication Documentation    Administrations This Visit     cyanocobalamin injection 1,000 mcg     Admin Date  02/03/2022 Action  Given Dose  1,000 mcg Route  Intramuscular Site  Left Deltoid Administered By  Viridiana Loaiza CMA    Ordering Provider: Jose Angel Meyer MD    NDC: 31580-289-29    Lot#: [id number]    : BenchPrep    Patient Supplied?: No                  Injectable Medication Documentation    Patient was given Cyanocobalamin (B-12). Prior to medication administration, verified patients identity using patient s name and date of birth. Please see MAR and medication order for additional information. Patient instructed to remain in clinic for 15 minutes.      Was entire vial of medication used? Yes  Vial/Syringe: Single dose vial  Expiration Date:  12/30/2022  Was this medication supplied by the patient? No    Name of provider who requested the medication administration: Dr. meyer  Name of provider on site (faculty or community preceptor) at the time of performing the medication administration: Dr. Jane    Date of next administration: monthly  Date of next office visit with provider to renew medication plan (must be seen annually): N/A          Chief Complaint   Patient presents with     Pain Management     Blood pressure 124/70, pulse 84, temperature 97.5  F (36.4  C), temperature source Oral, resp. rate 16, weight 90.1 kg (198 lb 9.6 oz), SpO2 100 %, not currently breastfeeding.   SUBJECTIVE:  Ruthy Álvarez is here for 2 issues.  First of all, she wants me to recheck her right great toe.  From my note on 01/06, she stubbed it somewhere around 12/20.  She cannot remember if it  has been 6 or 8 weeks, but regardless, she continues to have pain.  It is getting better slowly.  She stubbed this severely and has had some tenderness when she is walking on it and she is wondering why it is not getting better.    She also needs followup of her chronic pain.  She is on oxycodone at stable doses.  It is effective and helps her go about her ADLs, which is walking, doing housework, etc.  She has had no history of aberrant behavior and has had normal urine drug screens every visit I have seen her.  She denies any constipation.    She is still dealing with the stress of her daughter's death and the care of her grandchildren.  Her grandson Evens if of her greatest concern.  Her son-in-law, Evens's dad, has custody but is not providing care for him.  He commonly leaves him at home and is not even home when Evens is there, and Ruthy is hoping that her daughter, Rhiannon Álvarez, who lives in the [facility], will be able to get custody.  Evens's father's name is Mitch Liu.  Her daughter's other daughter is Saud Paige.  Alireza Paige is her dad.  He has apparently been in and out of retirement for a year and does not care much directly for Saud and lets her hang out with people that Ruthy suspects are using drugs.  She is concerned that her health may be at risk as a result of that.    Ruthy still wrestles with the fact that Naty, her daughter, , but at the same time, Naty treated her very poorly, and her greatest concern now is the care for her grandchildren.  Sometimes Evens stays with her, sometimes not, but again she is looking to see if she can get some help with custody of Evens.    OBJECTIVE:  Ruthy's blood pressure is surprisingly normal.  She is in no acute distress.  Exam of her right great toe reveals tenderness over the proximal phalanx.  Distal CMS is intact.  There is hyperpigmentation over the dorsum of the toe as well.  X-rays are as read below and does show a  mildly displaced fracture in the proximal toe.    ASSESSMENT:    1.  Great toe fracture.  She is able to bear weight.  It is healing slowly.  I expect that over the next month to 6 weeks, it should heal completely.  I recommended wearing a stiff-soled shoe to facilitate that.  We will reassess her when she returns in 1 month.  We will examine her toe and consider x-rays as needed, but again, I doubt that surgical intervention will be needed given that it is only minimally displaced.  2.  Regarding her chronic pain, refilled her oxycodone as PDMP showed no red flags and urine drug screen is appropriate today.  3.  Regarding her social stresses, we will contact our  here, who does not typically do custody work, but we will see if she has any additional resources.      Results for orders placed or performed in visit on 02/03/22   XR Toe Right G/E 2 Views     Status: None    Narrative    EXAM: XR TOE RIGHT G/E 2 VIEWS  LOCATION: [facility]  DATE/TIME: 2/3/2022 12:13 PM    INDICATION:  Great toe pain, right  COMPARISON: None.      Impression    IMPRESSION: Acute to subacute mildly displaced fracture of the great toe proximal phalanx. There is soft tissue swelling.    Atherosclerotic vascular calcifications.   Results for orders placed or performed in visit on 02/03/22   Urine Drugs of Abuse Screen Panel 13     Status: Abnormal   Result Value Ref Range    Cannabinoids (39-wog-8-carboxy-9-THC) Not Detected Not Detected, Indeterminate    Phencyclidine Not Detected Not Detected, Indeterminate    Cocaine (Benzoylecgonine) Not Detected Not Detected, Indeterminate    Methamphetamine (d-Methamphetamine) Not Detected Not Detected, Indeterminate    Opiates (Morphine) Not Detected Not Detected, Indeterminate    Amphetamine (d-Amphetamine) Not Detected Not Detected, Indeterminate    Benzodiazepines (Nordiazepam) Not Detected Not Detected, Indeterminate    Tricyclic Antidepressants (Desipramine) Not  Detected Not Detected, Indeterminate    Methadone Not Detected Not Detected, Indeterminate    Barbiturates (Butalbital) Not Detected Not Detected, Indeterminate    Oxycodone Detected (A) Not Detected, Indeterminate    Propoxyphene (Norpropoxyphene) Not Detected Not Detected, Indeterminate    Buprenorphine Not Detected Not Detected, Indeterminate      details… detailed exam

## 2022-07-07 PROBLEM — C24.0: Status: ACTIVE | Noted: 2020-10-27

## 2022-07-12 ENCOUNTER — APPOINTMENT (OUTPATIENT)
Dept: SURGICAL ONCOLOGY | Facility: CLINIC | Age: 67
End: 2022-07-12

## 2022-07-12 VITALS
SYSTOLIC BLOOD PRESSURE: 113 MMHG | BODY MASS INDEX: 12.46 KG/M2 | WEIGHT: 66 LBS | HEIGHT: 61 IN | DIASTOLIC BLOOD PRESSURE: 80 MMHG | HEART RATE: 75 BPM

## 2022-07-12 DIAGNOSIS — C24.0 MALIGNANT NEOPLASM OF EXTRAHEPATIC BILE DUCT: ICD-10-CM

## 2022-07-12 PROCEDURE — 99214 OFFICE O/P EST MOD 30 MIN: CPT

## 2022-07-12 NOTE — ASSESSMENT
[FreeTextEntry1] : IMP:\par New colitis episode\par History of Whipple 2011 for a villous adenoma in the CBD.  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 Prominent pancreatic tail on prior CT 2019.\par Carafate 4 times daily\par Creon 4 times daily\par \par \par PLAN:\par GI evaluation now\par RTO after GI evlauation\par \par

## 2022-07-12 NOTE — REASON FOR VISIT
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

## 2022-07-12 NOTE — HISTORY OF PRESENT ILLNESS
[de-identified] : Sade is a 67 year-old patient of my associate Dr. Damion Mauro. \par She recently was seen in the ER for colitis.\par She will be seeing a gastro-enteritis.\par \par She is s/p Whipple 8/2/2011 following identification of a villous adenoma in the CBD on EGD. Final pathology was consistent with intraductal papillary adenoma with high grade dysplasia of the cystic duct and CBD with 8 negative lymph nodes. 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 The cystic duct margin was involved.\par \par She developed a large incisional hernia, and ultimately underwent x-lap, BENJI, enterotomy x 2 with RNY jejunojejunostomy and hernia repair with abdominal wall reconstruction (Dr. Nagel) on 5/16/17. Final pathology was benign. \par \par Dr. Jay Seay referred her for a PET/CT on 9/20/18 which showed interval development of minimally hypermetabolic soft tissue in the resection bed adjacent to the pancreatic enteric anastomosis and likely accounting for pancreatic ductal dilation. There is also abnormal enhancing lobular expansion of the pancreatic body/tail region, also associated with low grade metabolic activity. FIndings are suspicious for recurrent disease. Report stated that the low grade uptake is probably accounted for by neuroendocrine pathology. There is uptake in the distal rectum to anus of unclear significance and may represent fecal stasis versus an underlying partially obstructing mass lesion. There is limited anatomic characterization for subtle sites of potential nacho disease given her weight loss and developing anasarca. \par \par Given concern for neuroendocrine neoplasm on prior FDG PET, she was referred for a DOTATATE PET/CT on 12/5/18 which showed no evidence of somatostatin receptor-bearing lesions. \par \par She was referred for a renal protocol CT A/P 11/2019 for question of hematuria. Findings included pneumobilia with prominent main pancreatic duct to 4 mm.  Prominent pancreatic tail which appears larger versus PET in 2018, enlarged uterus and diffuse subcutaneous edema consistent with anasarca. \par \par She has continued to report persistent abdominal pain.  She sought care in the ED in December 2019.  CT was non revealing.  Upper endoscopy revealed mild nonbleeding erosion/ulceration and gastritis.  Biopsies were negative for malignancy and negative for H. pylori.  She was advised to follow up with GI upon discharge for continued care. \par \par She was seen in [facility] ED  October 2020 for abdominal pain and weight loss.CT f chest, abdomen and pelvis was negative.\par \par 1/5/21: she states her weight has been fluctuating. She continues to have abdominal pain and bloating is unrelieved by Gas-X. She denies nausea,vomiting, constipation, or diarrhea. She has a good appetite. \par \par Today, she states she continue to have abdominal pain and bloating. She denies nausea, vomiting, constipation, or diarrhea. She states she recently had a CT scan at [facility] in May 2021, we will request these results. \par \par CT scan 5/2022:????

## 2022-07-12 NOTE — PHYSICAL EXAM
[Normal] : supple, no neck mass and thyroid not enlarged [Normal Supraclavicular Lymph Nodes] : normal supraclavicular lymph nodes [Normal Groin Lymph Nodes] : normal groin lymph nodes [Normal] : oriented to person, place and time, with appropriate affect [de-identified] : no masses or tenderness

## 2022-07-29 NOTE — PATIENT PROFILE ADULT - STATED REASON FOR ADMISSION
----- Message from Elda Brown PA-C sent at 7/29/2022  8:51 AM CDT -----  Covid negative.  Can start augmentin 875mg bid x 10 days.  
Patient is aware of results and recommendations. Augmentin sent.  
Abdominal pain

## 2022-09-09 NOTE — CHART NOTE - NSCHARTNOTEFT_GEN_A_CORE
NUTRITION SERVICES     Upon Nutritional Assessment by the Registered Dietitian your patient was determined to meet criteria/ has evidence of the following diagnosis/diagnoses:  [ ] Mild Protein Calorie Malnutrition   [ ] Moderate Protein Calorie Malnutrition   [X] Severe Protein Calorie Malnutrition   [ ] Unspecified Protein Calorie Malnutrition   [ ] Underweight / BMI <19  [ ] Morbid Obesity / BMI >40    Findings as based on:  •  Comprehensive nutritional assessment and consultation    Please refer to Initial Dietitian Evaluation via documents section of [employer] EMR for further recommendations.    Radha Stinson RDN, CDN   pager: [phone] There are no Wet Read(s) to document.

## 2022-12-16 NOTE — ED ADULT NURSE NOTE - NS ED NURSE DC PT EDUCATION RESOURCES
Location Indication Override (Is Already Calculated Based On Selected Body Location): Area M None needed

## 2023-01-24 NOTE — H&P PST ADULT - HISTORY OF PRESENT ILLNESS
Adult
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 S/p Pet scan revealing a hernia. Pt denies nausea, vomiting. Reports occasional constipation.

## 2023-03-17 NOTE — ED PROVIDER NOTE - PMH
Try alpha lipoic acid 600mg daily. (OTC supplement to help numb feeling).     1) Obtain US Parathyroid  2) Obtain Bone density  3) Hold hctz for 2 weeks and obtain blood work + 24 hr urine calcium off of hctz.    INSTRUCTION FOR 24 HOUR URINE COLLECTION4    1. Obtain a jug (or jugs) for 24 hour urine collection from local laboratory  2. Void first urine of the day into toilet, and flush it down. Do not take multivitamin on the day you perform 24 hour urine collection  3. Starting second void (urine), collect all urine for next 24hrs including first void of the morning after. (and refrigerate the urine collection OR place in a cooler)  4. Fast 12 hours overnight  (12 hour without food, 24 hours no alcohol, drink plenty of water, ok to take medications)  5. Drop off the urine jug(s) at the local lab and as you drop off the urine sample, obtain blood work.      If overall convincing evidence of parathyroid issue, we may recommend referral to surgeon ([facility] at [facility]).   
Cholelithiasis, Common Bile Duct  1990  Depression    FUO (Fever of Unknown Origin)  1964- right facial palsy  Incisional hernia    Pancreatic cancer  s/p Whipple in 2011

## 2023-06-27 NOTE — DISCHARGE NOTE ADULT - DISCHARGE DATE
Statement Selected RUE 3+/5; L shoulder 2-/5; Lelbow, wrists and digits 3+/5; RLE 3+/5; L hip and knee 3+/5; L ankle 1+/5 (+drop foot) 17-Aug-2017

## 2023-10-06 NOTE — PROGRESS NOTE BEHAVIORAL HEALTH - ORIENTED TO PLACE
Anesthesia Evaluation     Patient summary reviewed and Nursing notes reviewed   NPO Solid Status: > 8 hours  NPO Liquid Status: > 8 hours           Airway   Mallampati: II  TM distance: >3 FB  Neck ROM: full  No difficulty expected  Dental - normal exam     Pulmonary - normal exam   (+) COPD,  Cardiovascular - normal exam    ECG reviewed  Beta blocker given within 24 hours of surgery    (+) hypertension, valvular problems/murmurs AI, CAD, angina, CHF Systolic <55%, DVT, hyperlipidemia      Neuro/Psych  (+) CVA, headaches, psychiatric history  GI/Hepatic/Renal/Endo    (+) diabetes mellitus    Musculoskeletal     Abdominal  - normal exam    Bowel sounds: normal.   Substance History      OB/GYN          Other        ROS/Med Hx Other: Left Main %: 30% distal  Proximal LAD %: 0  Mid/Distal LAD %: 90% mid. 50% diagonal branch  LCX %: 90% OM1. 100% OM 2 with a left to left collaterals to a moderate-sized OM 2  Ramus:   RCA %: 70 to 80% proximal. 80% distal before the bifurcation. 70% proximal PDA  [place] %:   SVG(s) %:         ·  Left ventricular ejection fraction appears to be 36 - 40%.  ·  The right ventricular cavity is mildly dilated.  ·  Moderate aortic valve regurgitation is present.  ·  Estimated right ventricular systolic pressure from tricuspid regurgitation is normal (<35 mmHg).  ·  There is significant inferior and inferoseptal wall hypokinesis  ·  No pericardial effusion noted      Sinus rhythm  Left atrial enlargement  Probable left ventricular hypertrophy  Inferior infarct, old  Prolonged QT interval        MPRESSION:  Impression:  1.No evidence of pulmonary embolism.  2.Multifocal infiltrates more confluent at the lung bases with prominent lymph nodes favored to represent changes from pneumonia.  3.Moderate/mild coronary artery calcific atherosclerosis.                          Anesthesia Plan    ASA 4     general, Nevin, CVL and PAC     intravenous induction   Postoperative Plan: Expected vent after  surgery  Anesthetic plan, risks, benefits, and alternatives have been provided, discussed and informed consent has been obtained with: patient.      CODE STATUS:    Code Status (Patient has no pulse and is not breathing): CPR (Attempt to Resuscitate)  Medical Interventions (Patient has pulse or is breathing): Full Support       Yes

## 2023-11-08 NOTE — BEHAVIORAL HEALTH ASSESSMENT NOTE - PAST PSYCHOTROPIC MEDICATION
Lexapro , xanax, zoloft Melolabial Interpolation Flap Text: A decision was made to reconstruct the defect utilizing an interpolation axial flap and a staged reconstruction.  A telfa template was made of the defect.  This telfa template was then used to outline the melolabial interpolation flap.  The donor area for the pedicle flap was then injected with anesthesia.  The flap was excised through the skin and subcutaneous tissue down to the layer of the underlying musculature.  The pedicle flap was carefully excised within this deep plane to maintain its blood supply.  The edges of the donor site were undermined.   The donor site was closed in a primary fashion.  The pedicle was then rotated into position and sutured.  Once the tube was sutured into place, adequate blood supply was confirmed with blanching and refill.  The pedicle was then wrapped with xeroform gauze and dressed appropriately with a telfa and gauze bandage to ensure continued blood supply and protect the attached pedicle.

## 2024-01-15 NOTE — ED PROVIDER NOTE - GENITOURINARY [+], MLM
Patient requests all Lab, Cardiology, and Radiology Results on their Discharge Instructions
HEMATURIA

## 2024-02-12 NOTE — ED PROVIDER NOTE - CONSULTING PHYSICIAN
Patient is s/p Laparoscopic cholecystectomy with intraoperative cholangiogram on 2/6/24 with Dr. Iesha Marin.    Patient contacted our office. Patient reports that she called over the weekend and spoke with Dr. Nelson. Patient reports that she had yellow thick drainage and abdominal pain by belly button site that started Saturday 2/10/24. Patient then presented to the ER on Sunday 2/11/24 where she was diagnosed with Umbilical surgical site infection. Patient was prescribed Augmentin upon discharge from the ER and she did have CT abdomen scan performed. Patient denies any fever, chills, nausea, vomiting.    Will update Dr. Marin and Bette to see if they would like to see patient in clinic this week. Informed patient to keep area clean and dry with soap and water. Advised patient to keep site covered with bandage or gauze with tape if she is having drainage. Patient will use tylenol for the pain.    Patient verbalized understanding and had no further questions or concerns at this time.   Dr. Mauro

## 2024-02-21 ENCOUNTER — RX RENEWAL (OUTPATIENT)
Age: 69
End: 2024-02-21

## 2024-02-21 RX ORDER — PANCRELIPASE 60000; 12000; 38000 [USP'U]/1; [USP'U]/1; [USP'U]/1
12000-38000 CAPSULE, DELAYED RELEASE PELLETS ORAL
Qty: 120 | Refills: 10 | Status: ACTIVE | COMMUNITY
Start: 2020-10-27 | End: 1900-01-01

## 2024-08-19 NOTE — PATIENT PROFILE ADULT. - PRO ANTICIPATED DISCH DISP
Medication:   Requested Prescriptions     Pending Prescriptions Disp Refills    amphetamine-dextroamphetamine (ADDERALL, 15MG,) 15 MG tablet 60 tablet 0     Sig: Take 1 tablet by mouth 2 times daily for 30 days. Max Daily Amount: 30 mg        Last Filled:  7/15/24    Patient Phone Number: [phone] (home)     Last appt: 5/13/2024 Return in about 6 months (around 11/13/2024) for Annual Wellness.   Next appt: Visit date not found    Last OARRS:       2/17/2022     3:26 PM   RX Monitoring   Periodic Controlled Substance Monitoring No signs of potential drug abuse or diversion identified.          unsure

## 2024-09-17 ENCOUNTER — APPOINTMENT (OUTPATIENT)
Dept: SURGICAL ONCOLOGY | Facility: CLINIC | Age: 69
End: 2024-09-17
Payer: MEDICARE

## 2024-09-17 ENCOUNTER — NON-APPOINTMENT (OUTPATIENT)
Age: 69
End: 2024-09-17

## 2024-09-17 VITALS
DIASTOLIC BLOOD PRESSURE: 93 MMHG | OXYGEN SATURATION: 98 % | BODY MASS INDEX: 14.16 KG/M2 | HEIGHT: 61 IN | WEIGHT: 75 LBS | SYSTOLIC BLOOD PRESSURE: 160 MMHG | HEART RATE: 76 BPM

## 2024-09-17 DIAGNOSIS — D37.6: ICD-10-CM

## 2024-09-17 PROCEDURE — 99214 OFFICE O/P EST MOD 30 MIN: CPT

## 2024-09-17 RX ORDER — PANCRELIPASE 60000; 12000; 38000 [USP'U]/1; [USP'U]/1; [USP'U]/1
12000-38000 CAPSULE, DELAYED RELEASE PELLETS ORAL
Qty: 120 | Refills: 11 | Status: ACTIVE | COMMUNITY
Start: 2024-09-17 | End: 1900-01-01

## 2024-09-17 NOTE — ASSESSMENT
[FreeTextEntry1] : IMP: New colitis episode History of Whipple 2011 for a villous adenoma in the CBD.  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 Prominent pancreatic tail on prior CT 2019. Carafate 4 times daily Creon 4 times daily   PLAN: RTO PRN Continue creon

## 2024-09-17 NOTE — HISTORY OF PRESENT ILLNESS
[de-identified] : Sade is a 69 year-old patient of my associate Dr. Damion Mauro.  She recently was seen in the ER for colitis. She will be seeing a gastro-enteritis.  She is s/p Whipple 8/2/2011 following identification of a villous adenoma in the CBD on EGD. Final pathology was consistent with intraductal papillary adenoma with high grade dysplasia of the cystic duct and CBD with 8 negative lymph nodes. 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 The cystic duct margin was involved.  She developed a large incisional hernia, and ultimately underwent x-lap, BENJI, enterotomy x 2 with RNY jejunojejunostomy and hernia repair with abdominal wall reconstruction (Dr. Nagel) on 5/16/17. Final pathology was benign.   Dr. Jay Seay referred her for a PET/CT on 9/20/18 which showed interval development of minimally hypermetabolic soft tissue in the resection bed adjacent to the pancreatic enteric anastomosis and likely accounting for pancreatic ductal dilation. There is also abnormal enhancing lobular expansion of the pancreatic body/tail region, also associated with low grade metabolic activity. FIndings are suspicious for recurrent disease. Report stated that the low grade uptake is probably accounted for by neuroendocrine pathology. There is uptake in the distal rectum to anus of unclear significance and may represent fecal stasis versus an underlying partially obstructing mass lesion. There is limited anatomic characterization for subtle sites of potential nacho disease given her weight loss and developing anasarca.   Given concern for neuroendocrine neoplasm on prior FDG PET, she was referred for a DOTATATE PET/CT on 12/5/18 which showed no evidence of somatostatin receptor-bearing lesions.   She was referred for a renal protocol CT A/P 11/2019 for question of hematuria. Findings included pneumobilia with prominent main pancreatic duct to 4 mm.  Prominent pancreatic tail which appears larger versus PET in 2018, enlarged uterus and diffuse subcutaneous edema consistent with anasarca.   She has continued to report persistent abdominal pain.  She sought care in the ED in December 2019.  CT was non revealing.  Upper endoscopy revealed mild nonbleeding erosion/ulceration and gastritis.  Biopsies were negative for malignancy and negative for H. pylori.  She was advised to follow up with GI upon discharge for continued care.   She was seen in [facility] ED  October 2020 for abdominal pain and weight loss.CT f chest, abdomen and pelvis was negative.  1/5/21: she states her weight has been fluctuating. She continues to have abdominal pain and bloating is unrelieved by Gas-X. She denies nausea,vomiting, constipation, or diarrhea. She has a good appetite.   Today, she states she continue to have abdominal pain and bloating. She denies nausea, vomiting, constipation, or diarrhea. She states she recently had a CT scan at [facility] in May 2021, we will request these results.    CT scan:???

## 2024-09-17 NOTE — PHYSICAL EXAM
[Normal] : supple, no neck mass and thyroid not enlarged [Normal Supraclavicular Lymph Nodes] : normal supraclavicular lymph nodes [Normal Groin Lymph Nodes] : normal groin lymph nodes [Normal] : oriented to person, place and time, with appropriate affect [de-identified] : no masses or tenderness

## 2024-09-17 NOTE — HISTORY OF PRESENT ILLNESS
[de-identified] : Sade is a 69 year-old patient of my associate Dr. Damion Mauro.  She recently was seen in the ER for colitis. She will be seeing a gastro-enteritis.  She is s/p Whipple 8/2/2011 following identification of a villous adenoma in the CBD on EGD. Final pathology was consistent with intraductal papillary adenoma with high grade dysplasia of the cystic duct and CBD with 8 negative lymph nodes. 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 The cystic duct margin was involved.  She developed a large incisional hernia, and ultimately underwent x-lap, BENJI, enterotomy x 2 with RNY jejunojejunostomy and hernia repair with abdominal wall reconstruction (Dr. Nagel) on 5/16/17. Final pathology was benign.   Dr. Jay Seay referred her for a PET/CT on 9/20/18 which showed interval development of minimally hypermetabolic soft tissue in the resection bed adjacent to the pancreatic enteric anastomosis and likely accounting for pancreatic ductal dilation. There is also abnormal enhancing lobular expansion of the pancreatic body/tail region, also associated with low grade metabolic activity. FIndings are suspicious for recurrent disease. Report stated that the low grade uptake is probably accounted for by neuroendocrine pathology. There is uptake in the distal rectum to anus of unclear significance and may represent fecal stasis versus an underlying partially obstructing mass lesion. There is limited anatomic characterization for subtle sites of potential nacho disease given her weight loss and developing anasarca.   Given concern for neuroendocrine neoplasm on prior FDG PET, she was referred for a DOTATATE PET/CT on 12/5/18 which showed no evidence of somatostatin receptor-bearing lesions.   She was referred for a renal protocol CT A/P 11/2019 for question of hematuria. Findings included pneumobilia with prominent main pancreatic duct to 4 mm.  Prominent pancreatic tail which appears larger versus PET in 2018, enlarged uterus and diffuse subcutaneous edema consistent with anasarca.   She has continued to report persistent abdominal pain.  She sought care in the ED in December 2019.  CT was non revealing.  Upper endoscopy revealed mild nonbleeding erosion/ulceration and gastritis.  Biopsies were negative for malignancy and negative for H. pylori.  She was advised to follow up with GI upon discharge for continued care.   She was seen in [facility] ED  October 2020 for abdominal pain and weight loss.CT f chest, abdomen and pelvis was negative.  1/5/21: she states her weight has been fluctuating. She continues to have abdominal pain and bloating is unrelieved by Gas-X. She denies nausea,vomiting, constipation, or diarrhea. She has a good appetite.   Today, she states she continue to have abdominal pain and bloating. She denies nausea, vomiting, constipation, or diarrhea. She states she recently had a CT scan at [facility] in May 2021, we will request these results.    CT scan:???

## 2024-09-17 NOTE — PHYSICAL EXAM
[Normal] : supple, no neck mass and thyroid not enlarged [Normal Supraclavicular Lymph Nodes] : normal supraclavicular lymph nodes [Normal Groin Lymph Nodes] : normal groin lymph nodes [Normal] : oriented to person, place and time, with appropriate affect [de-identified] : no masses or tenderness

## 2024-09-19 RX ORDER — PANCRELIPASE LIPASE, PANCRELIPASE PROTEASE, PANCRELIPASE AMYLASE 15000; 47000; 63000 [USP'U]/1; [USP'U]/1; [USP'U]/1
15000-47000 CAPSULE, DELAYED RELEASE ORAL
Qty: 120 | Refills: 11 | Status: ACTIVE | COMMUNITY
Start: 2024-09-19 | End: 1900-01-01

## 2025-01-30 NOTE — ED PROVIDER NOTE - DATA REVIEWED, MDM
Letter created.  Letter sent to patient via [employer] and via mail.    old records/vital signs/nurses notes

## 2025-05-11 NOTE — H&P PST ADULT - PROBLEM/PLAN-2
Discussed events of evening with Dr. Maxwell with specifics of pt anxiety regarding I.V. sticks.  Also discussed rand catheter placement and now declining urine output as night progresses.  Pt is sleeping and slightly desaturated to 91% requiring O2 2LNC with sats now in 97-99% range.  Also discussed Heparin drip and plan to stop Heparin 4hrs prior to O.R. at 0330.  Ok received to hold on 10A recheck scheduled for 0130 due to above plan to stop infusion.  Will hold on diuresis per Dr. Maxwell.     DISPLAY PLAN FREE TEXT